# Patient Record
Sex: FEMALE | Race: WHITE | NOT HISPANIC OR LATINO | Employment: FULL TIME | URBAN - METROPOLITAN AREA
[De-identification: names, ages, dates, MRNs, and addresses within clinical notes are randomized per-mention and may not be internally consistent; named-entity substitution may affect disease eponyms.]

---

## 2017-01-09 ENCOUNTER — ALLSCRIPTS OFFICE VISIT (OUTPATIENT)
Dept: OTHER | Facility: OTHER | Age: 27
End: 2017-01-09

## 2017-01-09 ENCOUNTER — GENERIC CONVERSION - ENCOUNTER (OUTPATIENT)
Dept: OTHER | Facility: OTHER | Age: 27
End: 2017-01-09

## 2017-01-10 ENCOUNTER — GENERIC CONVERSION - ENCOUNTER (OUTPATIENT)
Dept: OTHER | Facility: OTHER | Age: 27
End: 2017-01-10

## 2017-01-10 LAB
ABO GROUP BLD: NORMAL
HCG, QUANTITATIVE (HISTORICAL): 2486 MIU/ML
PROGESTERONE LEVEL (HISTORICAL): 21.4 NG/ML
RH BLD: POSITIVE

## 2017-01-11 ENCOUNTER — ALLSCRIPTS OFFICE VISIT (OUTPATIENT)
Dept: OTHER | Facility: OTHER | Age: 27
End: 2017-01-11

## 2017-01-12 LAB — HCG, QUANTITATIVE (HISTORICAL): 4913 MIU/ML

## 2017-01-17 ENCOUNTER — ALLSCRIPTS OFFICE VISIT (OUTPATIENT)
Dept: OTHER | Facility: OTHER | Age: 27
End: 2017-01-17

## 2017-01-17 ENCOUNTER — GENERIC CONVERSION - ENCOUNTER (OUTPATIENT)
Dept: OTHER | Facility: OTHER | Age: 27
End: 2017-01-17

## 2017-01-18 LAB — HCG, QUANTITATIVE (HISTORICAL): NORMAL MIU/ML

## 2017-01-19 ENCOUNTER — GENERIC CONVERSION - ENCOUNTER (OUTPATIENT)
Dept: OTHER | Facility: OTHER | Age: 27
End: 2017-01-19

## 2017-01-19 DIAGNOSIS — O36.80X0 PREGNANCY WITH INCONCLUSIVE FETAL VIABILITY: ICD-10-CM

## 2017-01-24 ENCOUNTER — HOSPITAL ENCOUNTER (OUTPATIENT)
Dept: ULTRASOUND IMAGING | Facility: MEDICAL CENTER | Age: 27
Discharge: HOME | End: 2017-01-24
Payer: COMMERCIAL

## 2017-01-24 DIAGNOSIS — O36.80X0 PREGNANCY WITH INCONCLUSIVE FETAL VIABILITY: ICD-10-CM

## 2017-01-24 PROCEDURE — 76801 OB US < 14 WKS SINGLE FETUS: CPT

## 2017-02-13 ENCOUNTER — GENERIC CONVERSION - ENCOUNTER (OUTPATIENT)
Dept: OTHER | Facility: OTHER | Age: 27
End: 2017-02-13

## 2017-02-16 ENCOUNTER — ALLSCRIPTS OFFICE VISIT (OUTPATIENT)
Dept: OTHER | Facility: OTHER | Age: 27
End: 2017-02-16

## 2017-02-16 ENCOUNTER — GENERIC CONVERSION - ENCOUNTER (OUTPATIENT)
Dept: OTHER | Facility: OTHER | Age: 27
End: 2017-02-16

## 2017-02-17 LAB
ABO GROUP BLD: NORMAL
BACTERIA UR QL AUTO: ABNORMAL
BASOPHILS # BLD AUTO: 0 X10E3/UL (ref 0–0.2)
BASOPHILS # BLD AUTO: 1 %
BILIRUB UR QL STRIP: NEGATIVE
BLD GP AB SCN SERPL QL: NEGATIVE
COLOR UR: YELLOW
COMMENT (HISTORICAL): ABNORMAL
DEPRECATED RDW RBC AUTO: 12.8 % (ref 12.3–15.4)
EOSINOPHIL # BLD AUTO: 0 X10E3/UL (ref 0–0.4)
EOSINOPHIL # BLD AUTO: 1 %
FECAL OCCULT BLOOD DIAGNOSTIC (HISTORICAL): NEGATIVE
GLUCOSE (HISTORICAL): NEGATIVE
HCT VFR BLD AUTO: 40.6 % (ref 34–46.6)
HEPATITIS B SURFACE ANTIGEN (HISTORICAL): NEGATIVE
HGB BLD-MCNC: 13.6 G/DL (ref 11.1–15.9)
IMM.GRANULOCYTES (CD4/8) (HISTORICAL): 0 %
IMM.GRANULOCYTES (CD4/8) (HISTORICAL): 0 X10E3/UL (ref 0–0.1)
KETONES UR STRIP-MCNC: NEGATIVE MG/DL
LEUKOCYTE ESTERASE UR QL STRIP: NEGATIVE
LYMPHOCYTES # BLD AUTO: 1.5 X10E3/UL (ref 0.7–3.1)
LYMPHOCYTES # BLD AUTO: 17 %
MCH RBC QN AUTO: 31.1 PG (ref 26.6–33)
MCHC RBC AUTO-ENTMCNC: 33.5 G/DL (ref 31.5–35.7)
MCV RBC AUTO: 93 FL (ref 79–97)
MICROSCOPIC EXAMINATION (HISTORICAL): ABNORMAL
MICROSCOPIC EXAMINATION (HISTORICAL): ABNORMAL
MONOCYTES # BLD AUTO: 0.7 X10E3/UL (ref 0.1–0.9)
MONOCYTES (HISTORICAL): 8 %
MUCUS THREADS (HISTORICAL): PRESENT
NEUTROPHILS # BLD AUTO: 6.5 X10E3/UL (ref 1.4–7)
NEUTROPHILS # BLD AUTO: 73 %
NITRITE UR QL STRIP: NEGATIVE
NON-SQ EPI CELLS URNS QL MICRO: ABNORMAL /HPF
PH UR STRIP.AUTO: 7 [PH] (ref 5–7.5)
PLATELET # BLD AUTO: 254 X10E3/UL (ref 150–379)
PROT UR STRIP-MCNC: NEGATIVE MG/DL
RBC (HISTORICAL): 4.37 X10E6/UL (ref 3.77–5.28)
RBC (HISTORICAL): ABNORMAL /HPF
RH BLD: POSITIVE
RPR SCREEN (HISTORICAL): NON REACTIVE
RUBELLA, IGG (HISTORICAL): 5.86 INDEX
SP GR UR STRIP.AUTO: 1.02 (ref 1–1.03)
URINALYSIS (UA) (HISTORICAL): ABNORMAL
UROBILINOGEN UR QL STRIP.AUTO: 0.2 EU/DL (ref 0.2–1)
WBC # BLD AUTO: 8.8 X10E3/UL (ref 3.4–10.8)
WBC # BLD AUTO: ABNORMAL /HPF

## 2017-02-21 ENCOUNTER — GENERIC CONVERSION - ENCOUNTER (OUTPATIENT)
Dept: OTHER | Facility: OTHER | Age: 27
End: 2017-02-21

## 2017-02-26 ENCOUNTER — GENERIC CONVERSION - ENCOUNTER (OUTPATIENT)
Dept: OTHER | Facility: OTHER | Age: 27
End: 2017-02-26

## 2017-03-07 ENCOUNTER — ALLSCRIPTS OFFICE VISIT (OUTPATIENT)
Dept: PERINATAL CARE | Facility: CLINIC | Age: 27
End: 2017-03-07
Payer: COMMERCIAL

## 2017-03-07 ENCOUNTER — GENERIC CONVERSION - ENCOUNTER (OUTPATIENT)
Dept: OTHER | Facility: OTHER | Age: 27
End: 2017-03-07

## 2017-03-07 PROCEDURE — 76813 OB US NUCHAL MEAS 1 GEST: CPT | Performed by: OBSTETRICS & GYNECOLOGY

## 2017-03-16 ENCOUNTER — ALLSCRIPTS OFFICE VISIT (OUTPATIENT)
Dept: OTHER | Facility: OTHER | Age: 27
End: 2017-03-16

## 2017-03-17 ENCOUNTER — GENERIC CONVERSION - ENCOUNTER (OUTPATIENT)
Dept: OTHER | Facility: OTHER | Age: 27
End: 2017-03-17

## 2017-03-20 LAB
ADEQUACY: (HISTORICAL): NORMAL
CHLAMYDIA DFA, NAA OR PCR (HISTORICAL): NEGATIVE
CLINICIAN PROVIDIED ICD 9 OR 10 (HISTORICAL): NORMAL
COMMENT (HISTORICAL): NORMAL
DIAGNOSIS (HISTORICAL): NORMAL
GC, DNA PROB (HISTORICAL): NEGATIVE
NOTE: (HISTORICAL): NORMAL
PERFORMED BY (HISTORICAL): NORMAL
REFLEX (HISTORICAL): NORMAL
TEST INFORMATION (HISTORICAL): NORMAL

## 2017-04-11 ENCOUNTER — GENERIC CONVERSION - ENCOUNTER (OUTPATIENT)
Dept: OTHER | Facility: OTHER | Age: 27
End: 2017-04-11

## 2017-04-11 ENCOUNTER — ALLSCRIPTS OFFICE VISIT (OUTPATIENT)
Dept: OTHER | Facility: OTHER | Age: 27
End: 2017-04-11

## 2017-04-12 LAB
BACTERIA UR QL AUTO: ABNORMAL
BILIRUB UR QL STRIP: NEGATIVE
COLOR UR: YELLOW
COMMENT (HISTORICAL): ABNORMAL
CRYSTAL TYPE (HISTORICAL): ABNORMAL
CRYSTALS URNS QL MICRO: PRESENT
FECAL OCCULT BLOOD DIAGNOSTIC (HISTORICAL): NEGATIVE
GLUCOSE (HISTORICAL): NEGATIVE
HIV-1/2 AB-AG (HISTORICAL): NON REACTIVE
KETONES UR STRIP-MCNC: NEGATIVE MG/DL
LEUKOCYTE ESTERASE UR QL STRIP: NEGATIVE
MICROSCOPIC EXAMINATION (HISTORICAL): ABNORMAL
MICROSCOPIC EXAMINATION (HISTORICAL): ABNORMAL
MUCUS THREADS (HISTORICAL): PRESENT
NITRITE UR QL STRIP: NEGATIVE
NON-SQ EPI CELLS URNS QL MICRO: ABNORMAL /HPF
PH UR STRIP.AUTO: 6.5 [PH] (ref 5–7.5)
PROT UR STRIP-MCNC: NEGATIVE MG/DL
RBC (HISTORICAL): ABNORMAL /HPF
SP GR UR STRIP.AUTO: 1.02 (ref 1–1.03)
UROBILINOGEN UR QL STRIP.AUTO: 1 EU/DL (ref 0.2–1)
WBC # BLD AUTO: ABNORMAL /HPF

## 2017-04-13 LAB
CULTURE RESULT (HISTORICAL): NORMAL
MISCELLANEOUS LAB TEST RESULT (HISTORICAL): NO GROWTH

## 2017-04-25 ENCOUNTER — ALLSCRIPTS OFFICE VISIT (OUTPATIENT)
Dept: PERINATAL CARE | Facility: CLINIC | Age: 27
End: 2017-04-25
Payer: COMMERCIAL

## 2017-04-25 ENCOUNTER — GENERIC CONVERSION - ENCOUNTER (OUTPATIENT)
Dept: OTHER | Facility: OTHER | Age: 27
End: 2017-04-25

## 2017-04-25 PROCEDURE — 76805 OB US >/= 14 WKS SNGL FETUS: CPT | Performed by: OBSTETRICS & GYNECOLOGY

## 2017-04-25 PROCEDURE — 76817 TRANSVAGINAL US OBSTETRIC: CPT | Performed by: OBSTETRICS & GYNECOLOGY

## 2017-04-30 ENCOUNTER — GENERIC CONVERSION - ENCOUNTER (OUTPATIENT)
Dept: OTHER | Facility: OTHER | Age: 27
End: 2017-04-30

## 2017-05-11 ENCOUNTER — GENERIC CONVERSION - ENCOUNTER (OUTPATIENT)
Dept: OTHER | Facility: OTHER | Age: 27
End: 2017-05-11

## 2017-06-08 ENCOUNTER — GENERIC CONVERSION - ENCOUNTER (OUTPATIENT)
Dept: OTHER | Facility: OTHER | Age: 27
End: 2017-06-08

## 2017-06-08 ENCOUNTER — ALLSCRIPTS OFFICE VISIT (OUTPATIENT)
Dept: OTHER | Facility: OTHER | Age: 27
End: 2017-06-08

## 2017-06-09 LAB
BASOPHILS # BLD AUTO: 0 %
BASOPHILS # BLD AUTO: 0 X10E3/UL (ref 0–0.2)
DEPRECATED RDW RBC AUTO: 13.1 % (ref 12.3–15.4)
EOSINOPHIL # BLD AUTO: 0.1 X10E3/UL (ref 0–0.4)
EOSINOPHIL # BLD AUTO: 1 %
GESTATIONAL DIABETES SCREEN (HISTORICAL): 117 MG/DL (ref 65–139)
HCT VFR BLD AUTO: 38.8 % (ref 34–46.6)
HGB BLD-MCNC: 12.8 G/DL (ref 11.1–15.9)
IMM.GRANULOCYTES (CD4/8) (HISTORICAL): 0.1 X10E3/UL (ref 0–0.1)
IMM.GRANULOCYTES (CD4/8) (HISTORICAL): 1 %
LYMPHOCYTES # BLD AUTO: 1.5 X10E3/UL (ref 0.7–3.1)
LYMPHOCYTES # BLD AUTO: 13 %
MCH RBC QN AUTO: 32.1 PG (ref 26.6–33)
MCHC RBC AUTO-ENTMCNC: 33 G/DL (ref 31.5–35.7)
MCV RBC AUTO: 97 FL (ref 79–97)
MONOCYTES # BLD AUTO: 0.7 X10E3/UL (ref 0.1–0.9)
MONOCYTES (HISTORICAL): 6 %
NEUTROPHILS # BLD AUTO: 79 %
NEUTROPHILS # BLD AUTO: 9.5 X10E3/UL (ref 1.4–7)
PLATELET # BLD AUTO: 220 X10E3/UL (ref 150–379)
RBC (HISTORICAL): 3.99 X10E6/UL (ref 3.77–5.28)
WBC # BLD AUTO: 11.9 X10E3/UL (ref 3.4–10.8)

## 2017-06-20 ENCOUNTER — GENERIC CONVERSION - ENCOUNTER (OUTPATIENT)
Dept: OTHER | Facility: OTHER | Age: 27
End: 2017-06-20

## 2017-06-20 ENCOUNTER — ALLSCRIPTS OFFICE VISIT (OUTPATIENT)
Dept: PERINATAL CARE | Facility: CLINIC | Age: 27
End: 2017-06-20
Payer: COMMERCIAL

## 2017-06-20 PROCEDURE — 76816 OB US FOLLOW-UP PER FETUS: CPT | Performed by: OBSTETRICS & GYNECOLOGY

## 2017-06-23 ENCOUNTER — GENERIC CONVERSION - ENCOUNTER (OUTPATIENT)
Dept: OTHER | Facility: OTHER | Age: 27
End: 2017-06-23

## 2017-07-06 ENCOUNTER — GENERIC CONVERSION - ENCOUNTER (OUTPATIENT)
Dept: OTHER | Facility: OTHER | Age: 27
End: 2017-07-06

## 2017-07-20 ENCOUNTER — GENERIC CONVERSION - ENCOUNTER (OUTPATIENT)
Dept: OTHER | Facility: OTHER | Age: 27
End: 2017-07-20

## 2017-08-03 ENCOUNTER — GENERIC CONVERSION - ENCOUNTER (OUTPATIENT)
Dept: OTHER | Facility: OTHER | Age: 27
End: 2017-08-03

## 2017-08-07 ENCOUNTER — GENERIC CONVERSION - ENCOUNTER (OUTPATIENT)
Dept: OTHER | Facility: OTHER | Age: 27
End: 2017-08-07

## 2017-08-18 ENCOUNTER — GENERIC CONVERSION - ENCOUNTER (OUTPATIENT)
Dept: OTHER | Facility: OTHER | Age: 27
End: 2017-08-18

## 2017-08-18 ENCOUNTER — ALLSCRIPTS OFFICE VISIT (OUTPATIENT)
Dept: OTHER | Facility: OTHER | Age: 27
End: 2017-08-18

## 2017-08-20 LAB — STREP GRP B, MOLECULAR (HISTORICAL): NEGATIVE

## 2017-08-24 ENCOUNTER — GENERIC CONVERSION - ENCOUNTER (OUTPATIENT)
Dept: OTHER | Facility: OTHER | Age: 27
End: 2017-08-24

## 2017-08-25 LAB — CULTURE GENITAL-BSB ON (HISTORICAL): NORMAL

## 2017-08-31 ENCOUNTER — GENERIC CONVERSION - ENCOUNTER (OUTPATIENT)
Dept: OTHER | Facility: OTHER | Age: 27
End: 2017-08-31

## 2017-09-05 ENCOUNTER — GENERIC CONVERSION - ENCOUNTER (OUTPATIENT)
Dept: OTHER | Facility: OTHER | Age: 27
End: 2017-09-05

## 2017-09-12 ENCOUNTER — GENERIC CONVERSION - ENCOUNTER (OUTPATIENT)
Dept: OTHER | Facility: OTHER | Age: 27
End: 2017-09-12

## 2017-09-13 ENCOUNTER — HOSPITAL ENCOUNTER (INPATIENT)
Facility: HOSPITAL | Age: 27
LOS: 3 days | Discharge: HOME | End: 2017-09-16
Attending: OBSTETRICS & GYNECOLOGY | Admitting: OBSTETRICS & GYNECOLOGY
Payer: COMMERCIAL

## 2017-09-13 ENCOUNTER — ANESTHESIA (OUTPATIENT)
Dept: LABOR AND DELIVERY | Facility: HOSPITAL | Age: 27
End: 2017-09-13
Payer: COMMERCIAL

## 2017-09-13 ENCOUNTER — ANESTHESIA EVENT (OUTPATIENT)
Dept: LABOR AND DELIVERY | Facility: HOSPITAL | Age: 27
End: 2017-09-13
Payer: COMMERCIAL

## 2017-09-13 DIAGNOSIS — Z3A.40 40 WEEKS GESTATION OF PREGNANCY: Primary | ICD-10-CM

## 2017-09-13 PROBLEM — Z98.891 STATUS POST PRIMARY LOW TRANSVERSE CESAREAN SECTION: Status: ACTIVE | Noted: 2017-09-13

## 2017-09-13 LAB
ABO GROUP BLD: NORMAL
ALBUMIN SERPL BCP-MCNC: 2.8 G/DL (ref 3.5–5)
ALP SERPL-CCNC: 190 U/L (ref 46–116)
ALT SERPL W P-5'-P-CCNC: 34 U/L (ref 12–78)
ANION GAP SERPL CALCULATED.3IONS-SCNC: 8 MMOL/L (ref 4–13)
AST SERPL W P-5'-P-CCNC: 21 U/L (ref 5–45)
BASE EXCESS BLDCOA CALC-SCNC: -2 MMOL/L (ref 3–11)
BASE EXCESS BLDCOV CALC-SCNC: -3 MMOL/L (ref 1–9)
BASOPHILS # BLD AUTO: 0.02 THOUSANDS/ΜL (ref 0–0.1)
BASOPHILS NFR BLD AUTO: 0 % (ref 0–1)
BILIRUB SERPL-MCNC: 0.3 MG/DL (ref 0.2–1)
BLD GP AB SCN SERPL QL: NEGATIVE
BUN SERPL-MCNC: 12 MG/DL (ref 5–25)
CALCIUM SERPL-MCNC: 9.2 MG/DL (ref 8.3–10.1)
CHLORIDE SERPL-SCNC: 105 MMOL/L (ref 100–108)
CO2 SERPL-SCNC: 24 MMOL/L (ref 21–32)
CREAT SERPL-MCNC: 0.69 MG/DL (ref 0.6–1.3)
EOSINOPHIL # BLD AUTO: 0.03 THOUSAND/ΜL (ref 0–0.61)
EOSINOPHIL NFR BLD AUTO: 0 % (ref 0–6)
ERYTHROCYTE [DISTWIDTH] IN BLOOD BY AUTOMATED COUNT: 12.7 % (ref 11.6–15.1)
EXTERNAL GROUP B STREP ANTIGEN: NEGATIVE
GFR SERPL CREATININE-BSD FRML MDRD: 118 ML/MIN/1.73SQ M
GLUCOSE SERPL-MCNC: 89 MG/DL (ref 65–140)
HCO3 BLDCOA-SCNC: 27.1 MMOL/L (ref 17.3–27.3)
HCO3 BLDCOV-SCNC: 23.8 MMOL/L (ref 12.2–28.6)
HCT VFR BLD AUTO: 38.2 % (ref 34.8–46.1)
HGB BLD-MCNC: 13.5 G/DL (ref 11.5–15.4)
LYMPHOCYTES # BLD AUTO: 1.48 THOUSANDS/ΜL (ref 0.6–4.47)
LYMPHOCYTES NFR BLD AUTO: 9 % (ref 14–44)
MCH RBC QN AUTO: 33.3 PG (ref 26.8–34.3)
MCHC RBC AUTO-ENTMCNC: 35.3 G/DL (ref 31.4–37.4)
MCV RBC AUTO: 94 FL (ref 82–98)
MONOCYTES # BLD AUTO: 1.55 THOUSAND/ΜL (ref 0.17–1.22)
MONOCYTES NFR BLD AUTO: 9 % (ref 4–12)
NEUTROPHILS # BLD AUTO: 14.3 THOUSANDS/ΜL (ref 1.85–7.62)
NEUTS SEG NFR BLD AUTO: 82 % (ref 43–75)
NRBC BLD AUTO-RTO: 0 /100 WBCS
O2 CT VFR BLDCOA CALC: 3.1 ML/DL
OXYHGB MFR BLDCOA: 12.8 %
OXYHGB MFR BLDCOV: 41 %
PCO2 BLDCOA: 63.4 MM[HG] (ref 30–60)
PCO2 BLDCOV: 48.3 MM HG (ref 27–43)
PH BLDCOA: 7.25 [PH] (ref 7.23–7.43)
PH BLDCOV: 7.31 [PH] (ref 7.19–7.49)
PLATELET # BLD AUTO: 170 THOUSANDS/UL (ref 149–390)
PMV BLD AUTO: 13.7 FL (ref 8.9–12.7)
PO2 BLDCOA: 10.7 MM HG (ref 5–25)
PO2 BLDCOV: 20.2 MM HG (ref 15–45)
POTASSIUM SERPL-SCNC: 3.8 MMOL/L (ref 3.5–5.3)
PROT SERPL-MCNC: 7.2 G/DL (ref 6.4–8.2)
RBC # BLD AUTO: 4.05 MILLION/UL (ref 3.81–5.12)
RH BLD: POSITIVE
SAO2 % BLDCOV: 10.4 ML/DL
SODIUM SERPL-SCNC: 137 MMOL/L (ref 136–145)
SPECIMEN EXPIRATION DATE: NORMAL
WBC # BLD AUTO: 17.38 THOUSAND/UL (ref 4.31–10.16)

## 2017-09-13 PROCEDURE — 80053 COMPREHEN METABOLIC PANEL: CPT | Performed by: OBSTETRICS & GYNECOLOGY

## 2017-09-13 PROCEDURE — 82805 BLOOD GASES W/O2 SATURATION: CPT | Performed by: OBSTETRICS & GYNECOLOGY

## 2017-09-13 PROCEDURE — 86592 SYPHILIS TEST NON-TREP QUAL: CPT | Performed by: OBSTETRICS & GYNECOLOGY

## 2017-09-13 PROCEDURE — 88307 TISSUE EXAM BY PATHOLOGIST: CPT | Performed by: OBSTETRICS & GYNECOLOGY

## 2017-09-13 PROCEDURE — 86850 RBC ANTIBODY SCREEN: CPT | Performed by: OBSTETRICS & GYNECOLOGY

## 2017-09-13 PROCEDURE — 4A1HXCZ MONITORING OF PRODUCTS OF CONCEPTION, CARDIAC RATE, EXTERNAL APPROACH: ICD-10-PCS | Performed by: OBSTETRICS & GYNECOLOGY

## 2017-09-13 PROCEDURE — 86900 BLOOD TYPING SEROLOGIC ABO: CPT | Performed by: OBSTETRICS & GYNECOLOGY

## 2017-09-13 PROCEDURE — 86901 BLOOD TYPING SEROLOGIC RH(D): CPT | Performed by: OBSTETRICS & GYNECOLOGY

## 2017-09-13 PROCEDURE — 85025 COMPLETE CBC W/AUTO DIFF WBC: CPT | Performed by: OBSTETRICS & GYNECOLOGY

## 2017-09-13 RX ORDER — SODIUM CHLORIDE, SODIUM LACTATE, POTASSIUM CHLORIDE, CALCIUM CHLORIDE 600; 310; 30; 20 MG/100ML; MG/100ML; MG/100ML; MG/100ML
125 INJECTION, SOLUTION INTRAVENOUS CONTINUOUS
Status: DISCONTINUED | OUTPATIENT
Start: 2017-09-13 | End: 2017-09-16 | Stop reason: HOSPADM

## 2017-09-13 RX ORDER — OXYTOCIN/RINGER'S LACTATE 30/500 ML
62.5 PLASTIC BAG, INJECTION (ML) INTRAVENOUS ONCE
Status: DISCONTINUED | OUTPATIENT
Start: 2017-09-13 | End: 2017-09-16 | Stop reason: HOSPADM

## 2017-09-13 RX ORDER — DOCUSATE SODIUM 100 MG/1
100 CAPSULE, LIQUID FILLED ORAL 2 TIMES DAILY
Status: DISCONTINUED | OUTPATIENT
Start: 2017-09-13 | End: 2017-09-16 | Stop reason: HOSPADM

## 2017-09-13 RX ORDER — OXYCODONE HYDROCHLORIDE AND ACETAMINOPHEN 5; 325 MG/1; MG/1
2 TABLET ORAL EVERY 4 HOURS PRN
Status: DISCONTINUED | OUTPATIENT
Start: 2017-09-13 | End: 2017-09-16 | Stop reason: HOSPADM

## 2017-09-13 RX ORDER — ACETAMINOPHEN 325 MG/1
650 TABLET ORAL EVERY 6 HOURS PRN
Status: DISCONTINUED | OUTPATIENT
Start: 2017-09-13 | End: 2017-09-16 | Stop reason: HOSPADM

## 2017-09-13 RX ORDER — CALCIUM CARBONATE 200(500)MG
1000 TABLET,CHEWABLE ORAL DAILY PRN
Status: DISCONTINUED | OUTPATIENT
Start: 2017-09-13 | End: 2017-09-16 | Stop reason: HOSPADM

## 2017-09-13 RX ORDER — NALOXONE HYDROCHLORIDE 0.4 MG/ML
0.1 INJECTION, SOLUTION INTRAMUSCULAR; INTRAVENOUS; SUBCUTANEOUS
Status: ACTIVE | OUTPATIENT
Start: 2017-09-13 | End: 2017-09-14

## 2017-09-13 RX ORDER — METOCLOPRAMIDE HYDROCHLORIDE 5 MG/ML
INJECTION INTRAMUSCULAR; INTRAVENOUS AS NEEDED
Status: DISCONTINUED | OUTPATIENT
Start: 2017-09-13 | End: 2017-09-13 | Stop reason: SURG

## 2017-09-13 RX ORDER — KETOROLAC TROMETHAMINE 30 MG/ML
15 INJECTION, SOLUTION INTRAMUSCULAR; INTRAVENOUS EVERY 6 HOURS
Status: COMPLETED | OUTPATIENT
Start: 2017-09-13 | End: 2017-09-14

## 2017-09-13 RX ORDER — IBUPROFEN 600 MG/1
600 TABLET ORAL EVERY 6 HOURS PRN
Status: DISCONTINUED | OUTPATIENT
Start: 2017-09-14 | End: 2017-09-16 | Stop reason: HOSPADM

## 2017-09-13 RX ORDER — MORPHINE SULFATE 1 MG/ML
INJECTION, SOLUTION EPIDURAL; INTRATHECAL; INTRAVENOUS AS NEEDED
Status: DISCONTINUED | OUTPATIENT
Start: 2017-09-13 | End: 2017-09-13 | Stop reason: SURG

## 2017-09-13 RX ORDER — MORPHINE SULFATE 1 MG/ML
INJECTION, SOLUTION EPIDURAL; INTRATHECAL; INTRAVENOUS
Status: DISPENSED
Start: 2017-09-13 | End: 2017-09-14

## 2017-09-13 RX ORDER — ONDANSETRON 2 MG/ML
INJECTION INTRAMUSCULAR; INTRAVENOUS AS NEEDED
Status: DISCONTINUED | OUTPATIENT
Start: 2017-09-13 | End: 2017-09-13 | Stop reason: SURG

## 2017-09-13 RX ORDER — MIDAZOLAM HYDROCHLORIDE 1 MG/ML
INJECTION INTRAMUSCULAR; INTRAVENOUS
Status: DISPENSED
Start: 2017-09-13 | End: 2017-09-14

## 2017-09-13 RX ORDER — OXYCODONE HYDROCHLORIDE AND ACETAMINOPHEN 5; 325 MG/1; MG/1
1 TABLET ORAL EVERY 4 HOURS PRN
Status: DISCONTINUED | OUTPATIENT
Start: 2017-09-14 | End: 2017-09-16 | Stop reason: HOSPADM

## 2017-09-13 RX ORDER — ONDANSETRON 2 MG/ML
4 INJECTION INTRAMUSCULAR; INTRAVENOUS EVERY 6 HOURS PRN
Status: DISCONTINUED | OUTPATIENT
Start: 2017-09-13 | End: 2017-09-16 | Stop reason: HOSPADM

## 2017-09-13 RX ORDER — DIPHENHYDRAMINE HYDROCHLORIDE 50 MG/ML
25 INJECTION INTRAMUSCULAR; INTRAVENOUS EVERY 6 HOURS PRN
Status: DISCONTINUED | OUTPATIENT
Start: 2017-09-13 | End: 2017-09-16 | Stop reason: HOSPADM

## 2017-09-13 RX ORDER — FENTANYL CITRATE 50 UG/ML
INJECTION, SOLUTION INTRAMUSCULAR; INTRAVENOUS
Status: DISPENSED
Start: 2017-09-13 | End: 2017-09-14

## 2017-09-13 RX ORDER — BUPIVACAINE HYDROCHLORIDE 7.5 MG/ML
INJECTION, SOLUTION INTRASPINAL AS NEEDED
Status: DISCONTINUED | OUTPATIENT
Start: 2017-09-13 | End: 2017-09-13 | Stop reason: SURG

## 2017-09-13 RX ORDER — SIMETHICONE 80 MG
80 TABLET,CHEWABLE ORAL 4 TIMES DAILY PRN
Status: DISCONTINUED | OUTPATIENT
Start: 2017-09-13 | End: 2017-09-16 | Stop reason: HOSPADM

## 2017-09-13 RX ORDER — HYDROMORPHONE HCL 110MG/55ML
1 PATIENT CONTROLLED ANALGESIA SYRINGE INTRAVENOUS EVERY 2 HOUR PRN
Status: DISCONTINUED | OUTPATIENT
Start: 2017-09-14 | End: 2017-09-16 | Stop reason: HOSPADM

## 2017-09-13 RX ORDER — ONDANSETRON 2 MG/ML
4 INJECTION INTRAMUSCULAR; INTRAVENOUS EVERY 4 HOURS PRN
Status: ACTIVE | OUTPATIENT
Start: 2017-09-13 | End: 2017-09-14

## 2017-09-13 RX ORDER — ONDANSETRON 2 MG/ML
4 INJECTION INTRAMUSCULAR; INTRAVENOUS EVERY 8 HOURS PRN
Status: DISCONTINUED | OUTPATIENT
Start: 2017-09-13 | End: 2017-09-16 | Stop reason: HOSPADM

## 2017-09-13 RX ORDER — OXYCODONE HYDROCHLORIDE AND ACETAMINOPHEN 5; 325 MG/1; MG/1
2 TABLET ORAL EVERY 4 HOURS PRN
Status: DISCONTINUED | OUTPATIENT
Start: 2017-09-14 | End: 2017-09-16 | Stop reason: HOSPADM

## 2017-09-13 RX ORDER — PROMETHAZINE HYDROCHLORIDE 25 MG/ML
12.5 INJECTION, SOLUTION INTRAMUSCULAR; INTRAVENOUS EVERY 4 HOURS PRN
Status: DISCONTINUED | OUTPATIENT
Start: 2017-09-13 | End: 2017-09-16 | Stop reason: HOSPADM

## 2017-09-13 RX ADMIN — OXYTOCIN 250 MILLI-UNITS/MIN: 10 INJECTION, SOLUTION INTRAMUSCULAR; INTRAVENOUS at 19:51

## 2017-09-13 RX ADMIN — SODIUM CHLORIDE, SODIUM LACTATE, POTASSIUM CHLORIDE, AND CALCIUM CHLORIDE: .6; .31; .03; .02 INJECTION, SOLUTION INTRAVENOUS at 20:01

## 2017-09-13 RX ADMIN — ONDANSETRON HYDROCHLORIDE 8 MG: 2 INJECTION, SOLUTION INTRAVENOUS at 18:48

## 2017-09-13 RX ADMIN — OXYTOCIN 250 MILLI-UNITS/MIN: 10 INJECTION, SOLUTION INTRAMUSCULAR; INTRAVENOUS at 19:19

## 2017-09-13 RX ADMIN — SODIUM CHLORIDE, SODIUM LACTATE, POTASSIUM CHLORIDE, AND CALCIUM CHLORIDE: .6; .31; .03; .02 INJECTION, SOLUTION INTRAVENOUS at 18:37

## 2017-09-13 RX ADMIN — MORPHINE SULFATE 0.1 MG: 1 INJECTION, SOLUTION EPIDURAL; INTRATHECAL; INTRAVENOUS at 19:04

## 2017-09-13 RX ADMIN — KETOROLAC TROMETHAMINE 30 MG: 30 INJECTION, SOLUTION INTRAMUSCULAR at 19:54

## 2017-09-13 RX ADMIN — METOCLOPRAMIDE HYDROCHLORIDE 10 MG: 5 INJECTION INTRAMUSCULAR; INTRAVENOUS at 18:48

## 2017-09-13 RX ADMIN — SODIUM CHLORIDE, SODIUM LACTATE, POTASSIUM CHLORIDE, AND CALCIUM CHLORIDE: .6; .31; .03; .02 INJECTION, SOLUTION INTRAVENOUS at 19:12

## 2017-09-13 RX ADMIN — CEFAZOLIN SODIUM 2000 MG: 2 SOLUTION INTRAVENOUS at 18:48

## 2017-09-13 RX ADMIN — BUPIVACAINE HYDROCHLORIDE IN DEXTROSE 1.6 ML: 7.5 INJECTION, SOLUTION SUBARACHNOID at 19:04

## 2017-09-14 LAB
BASOPHILS # BLD AUTO: 0.03 THOUSANDS/ΜL (ref 0–0.1)
BASOPHILS NFR BLD AUTO: 0 % (ref 0–1)
EOSINOPHIL # BLD AUTO: 0.06 THOUSAND/ΜL (ref 0–0.61)
EOSINOPHIL NFR BLD AUTO: 1 % (ref 0–6)
ERYTHROCYTE [DISTWIDTH] IN BLOOD BY AUTOMATED COUNT: 13 % (ref 11.6–15.1)
HCT VFR BLD AUTO: 29.2 % (ref 34.8–46.1)
HGB BLD-MCNC: 9.9 G/DL (ref 11.5–15.4)
LYMPHOCYTES # BLD AUTO: 1.83 THOUSANDS/ΜL (ref 0.6–4.47)
LYMPHOCYTES NFR BLD AUTO: 14 % (ref 14–44)
MCH RBC QN AUTO: 32.5 PG (ref 26.8–34.3)
MCHC RBC AUTO-ENTMCNC: 33.9 G/DL (ref 31.4–37.4)
MCV RBC AUTO: 96 FL (ref 82–98)
MONOCYTES # BLD AUTO: 0.96 THOUSAND/ΜL (ref 0.17–1.22)
MONOCYTES NFR BLD AUTO: 7 % (ref 4–12)
NEUTROPHILS # BLD AUTO: 10.1 THOUSANDS/ΜL (ref 1.85–7.62)
NEUTS SEG NFR BLD AUTO: 78 % (ref 43–75)
NRBC BLD AUTO-RTO: 0 /100 WBCS
PLATELET # BLD AUTO: 130 THOUSANDS/UL (ref 149–390)
PMV BLD AUTO: 12.9 FL (ref 8.9–12.7)
RBC # BLD AUTO: 3.05 MILLION/UL (ref 3.81–5.12)
RPR SER QL: NORMAL
WBC # BLD AUTO: 12.98 THOUSAND/UL (ref 4.31–10.16)

## 2017-09-14 PROCEDURE — 85025 COMPLETE CBC W/AUTO DIFF WBC: CPT | Performed by: OBSTETRICS & GYNECOLOGY

## 2017-09-14 RX ADMIN — SODIUM CHLORIDE, SODIUM LACTATE, POTASSIUM CHLORIDE, AND CALCIUM CHLORIDE 1000 ML: 600; 310; 30; 20 INJECTION, SOLUTION INTRAVENOUS at 08:07

## 2017-09-14 RX ADMIN — DIPHENHYDRAMINE HYDROCHLORIDE 25 MG: 50 INJECTION, SOLUTION INTRAMUSCULAR; INTRAVENOUS at 00:02

## 2017-09-14 RX ADMIN — KETOROLAC TROMETHAMINE 15 MG: 30 INJECTION, SOLUTION INTRAMUSCULAR at 14:48

## 2017-09-14 RX ADMIN — SODIUM CHLORIDE, SODIUM LACTATE, POTASSIUM CHLORIDE, AND CALCIUM CHLORIDE 125 ML/HR: .6; .31; .03; .02 INJECTION, SOLUTION INTRAVENOUS at 03:20

## 2017-09-14 RX ADMIN — KETOROLAC TROMETHAMINE 15 MG: 30 INJECTION, SOLUTION INTRAMUSCULAR at 02:12

## 2017-09-14 RX ADMIN — DOCUSATE SODIUM 100 MG: 100 CAPSULE, LIQUID FILLED ORAL at 18:37

## 2017-09-14 RX ADMIN — KETOROLAC TROMETHAMINE 15 MG: 30 INJECTION, SOLUTION INTRAMUSCULAR at 08:23

## 2017-09-14 RX ADMIN — IBUPROFEN 600 MG: 600 TABLET, FILM COATED ORAL at 19:11

## 2017-09-14 RX ADMIN — DOCUSATE SODIUM 100 MG: 100 CAPSULE, LIQUID FILLED ORAL at 08:29

## 2017-09-14 RX ADMIN — SODIUM CHLORIDE, SODIUM LACTATE, POTASSIUM CHLORIDE, AND CALCIUM CHLORIDE 125 ML/HR: .6; .31; .03; .02 INJECTION, SOLUTION INTRAVENOUS at 01:11

## 2017-09-15 RX ADMIN — DOCUSATE SODIUM 100 MG: 100 CAPSULE, LIQUID FILLED ORAL at 18:34

## 2017-09-15 RX ADMIN — IBUPROFEN 600 MG: 600 TABLET, FILM COATED ORAL at 17:11

## 2017-09-15 RX ADMIN — IBUPROFEN 600 MG: 600 TABLET, FILM COATED ORAL at 10:59

## 2017-09-15 RX ADMIN — DOCUSATE SODIUM 100 MG: 100 CAPSULE, LIQUID FILLED ORAL at 09:07

## 2017-09-15 RX ADMIN — IBUPROFEN 600 MG: 600 TABLET, FILM COATED ORAL at 04:41

## 2017-09-16 VITALS
DIASTOLIC BLOOD PRESSURE: 96 MMHG | WEIGHT: 198 LBS | HEIGHT: 66 IN | SYSTOLIC BLOOD PRESSURE: 131 MMHG | BODY MASS INDEX: 31.82 KG/M2 | RESPIRATION RATE: 18 BRPM | OXYGEN SATURATION: 100 % | TEMPERATURE: 98.4 F | HEART RATE: 64 BPM

## 2017-09-16 RX ORDER — IBUPROFEN 600 MG/1
600 TABLET ORAL EVERY 6 HOURS PRN
Qty: 30 TABLET | Refills: 0 | Status: SHIPPED | OUTPATIENT
Start: 2017-09-16 | End: 2019-02-25

## 2017-09-16 RX ORDER — OXYCODONE HYDROCHLORIDE AND ACETAMINOPHEN 5; 325 MG/1; MG/1
1 TABLET ORAL EVERY 4 HOURS PRN
Refills: 0
Start: 2017-09-16 | End: 2017-09-16

## 2017-09-16 RX ORDER — OXYCODONE HYDROCHLORIDE AND ACETAMINOPHEN 5; 325 MG/1; MG/1
1-2 TABLET ORAL EVERY 4 HOURS PRN
Qty: 30 TABLET | Refills: 0 | Status: SHIPPED | OUTPATIENT
Start: 2017-09-16 | End: 2017-09-26

## 2017-09-16 RX ADMIN — DOCUSATE SODIUM 100 MG: 100 CAPSULE, LIQUID FILLED ORAL at 08:08

## 2017-09-16 RX ADMIN — IBUPROFEN 600 MG: 600 TABLET, FILM COATED ORAL at 00:26

## 2017-09-16 RX ADMIN — IBUPROFEN 600 MG: 600 TABLET, FILM COATED ORAL at 08:01

## 2017-09-19 ENCOUNTER — GENERIC CONVERSION - ENCOUNTER (OUTPATIENT)
Dept: OTHER | Facility: OTHER | Age: 27
End: 2017-09-19

## 2017-10-24 ENCOUNTER — ALLSCRIPTS OFFICE VISIT (OUTPATIENT)
Dept: OTHER | Facility: OTHER | Age: 27
End: 2017-10-24

## 2017-10-24 ENCOUNTER — GENERIC CONVERSION - ENCOUNTER (OUTPATIENT)
Dept: OTHER | Facility: OTHER | Age: 27
End: 2017-10-24

## 2018-01-09 NOTE — PROGRESS NOTES
Chief Complaint  Pt presents today for a bhcg, prog and t&s  Active Problems    1  Attempting to conceive (V49 89) (Z78 9)    Current Meds   1  Allegra Allergy TABS (Fexofenadine HCl); Therapy: (Recorded:[id number]) to Recorded   2  Prenatal Vitamins TABS; Therapy: (Recorded:[id number]) to Recorded    Allergies    1  Penicillins   2  Sulfa Drugs    Future Appointments    Date/Time Provider Specialty Site   01/17/2017 08:00 AM ARBEN Avila   Obstetrics/Gynecology OB [address] [place] [address]     Signatures   Electronically signed by : ARBEN Tomas ; Saman 10 2017  8:30AM EST

## 2018-01-10 NOTE — PROGRESS NOTES
MAR 7 2017         RE: Kendal Granados                               To: Ob/Gyn Care   [facility] [address]  Chemo's   MR#: [id number]                                  [address]   : AUG Janell [address], [address]: [id number]                             Fax: [phone]   (Exam #: [id number])      The LMP of this 27year old,  G2, P0-0-1-0 patient was DEC 6 2016, giving   her an YULIET of SEP 12 2017 and a current gestational age of 17 weeks 0 days   by dates  A sonographic examination was performed on MAR 7 2017 using real   time equipment  The ultrasound examination was performed using abdominal   technique  The patient has a BMI of 25 0  Her blood pressure today was   139/82  Earliest ultrasound found in her record: 17   7w0d   17  YULIET      Thank you very much for your referral of Kendal Granados to the [facility] in [facility] on 2017 for first trimester ultrasound   evaluation, genetic screening, and MFM assessment  Flynn Valencia is a   68-year-old  2 para 65 white female who is currently at 13-0/7   weeks gestation by an estimated due date of 2017  Her early   prenatal course has been unremarkable  Flynn Valencia has no complaints  She   denies vaginal bleeding  Her past medical history is unremarkable  Her   past surgical history is significant for shoulder surgery for torn labrum  She also has had wisdom teeth removal  She takes no medication with the   exception of a prenatal vitamin on a daily basis  She has allergies to   penicillin and sulfa  She denies tobacco, alcohol, or illicit drug use   during the pregnancy  Her sister has hypertension  There is no other first   degree family member with a diagnosis of hypertension or diabetes  The   family genetic history is negative with respect to genetic abnormalities,   birth defects, or mental retardation        Multiple longitudinal and transverse sections revealed a miranda   intrauterine pregnancy with the fetus in variable presentation  The   placenta is posterior in implantation  Cardiac motion was observed at 162 bpm       INDICATIONS      first trimester genetic screening      Exam Types      sequential screen      RESULTS      Fetus # 1 of 1   Variable presentation   Fetal growth appeared normal      MEASUREMENTS (* Included In Average GA)      CRL              7 2 cm        13 weeks 1 day *   Nuchal Trans    1 80 mm      THE AVERAGE GESTATIONAL AGE is 13 weeks 1 day +/- 7 days  ANATOMY COMMENTS      Anatomic detail is limited at this gestational age  The yolk sac was not   noted  The fetal cranium appeared normal in shape and the nuchal   translucency was normal in size at 1 8 mm  The nasal bone appears to be   present  The intracranial anatomy was unremarkable  Evaluation of the   spine revealed no obvious evidence for a neural tube defect  Anatomy of   the fetal thorax appeared within normal limits  The cardiac rhythm was   regular  Within the abdomen, stomach & bladder were visualized and the   abdominal wall appeared intact  Active movement of the fetal body &   extremities was seen  There is no suspicion of a subchorionic bleed  The   placental cord insertion was normal    There is no suspicion of a uterine   myoma  Free fluid is not seen in the posterior cul-de-sac  ADNEXA      The left ovary was not visualized  The right ovary was not visualized  AMNIOTIC FLUID         Largest Vertical Pocket = 7 1 cm   Amniotic Fluid: Normal      IMPRESSION      Miranda IUP   13 weeks and 1 day by this ultrasound  (YULIET=SEP 11 2017)   Variable presentation   Fetal growth appeared normal   Regular fetal heart rate of 162 bpm   Posterior placenta      GENERAL COMMENT      Today's ultrasound findings and suggested follow-up were discussed in   detail with Elizabeth    The Sequential Screen was discussed in detail, including the sensitivities for detection of Down syndrome, Trisomy 18,   and open neural tube defects  Definitive prenatal diagnosis is possible   only through genetic amniocentesis or CVS  Bautista Upton underwent fingerstick   blood collection for hCG and YOSHI-A to complete the initial component of   the Sequential Screen  Results should be available within one week  Follow-up multiple marker serum screening at 16-18 weeks gestation is   recommended to complete the Sequential Screen  Fetal Level II ultrasound   imaging is scheduled at about 20 weeks gestation  The face to face time, in addition to time spent discussing ultrasound   results, was approximately 10 minutes, greater than 50% of which was spent   during counseling and coordination of care  TANVIR Ruiz S , R ANGEL AC S  ARBEN Celis     Maternal-Fetal Medicine   Electronically signed 03/08/17 05:58

## 2018-01-10 NOTE — PROGRESS NOTES
Chief Complaint  PT presents for a repeat HCG      Active Problems    1  Attempting to conceive (V49 89) (Z78 9)   2  Positive urine pregnancy test (V72 42) (Z32 01)    Current Meds   1  Allegra Allergy TABS; Therapy: (Recorded:52Tvj4167) to Recorded   2  Prenatal Vitamins TABS; Therapy: (Recorded:50Npe2801) to Recorded    Allergies    1  Penicillins   2  Sulfa Drugs    Plan  Encounter to determine fetal viability of pregnancy    · (1) HCG QUANT; Status:Active - Retrospective By Protocol Authorization; Requested  RZJ:[id number]; Future Appointments    Date/Time Provider Specialty Site   01/17/2017 08:00 AM ARBEN Bob  Obstetrics/Gynecology [facility]     Signatures   Electronically signed by :  ARBEN Ruiz ; Jan 11 2017  4:27PM EST                       (Author)

## 2018-01-13 VITALS
WEIGHT: 155 LBS | SYSTOLIC BLOOD PRESSURE: 139 MMHG | HEIGHT: 66 IN | BODY MASS INDEX: 24.91 KG/M2 | DIASTOLIC BLOOD PRESSURE: 82 MMHG

## 2018-01-13 VITALS — DIASTOLIC BLOOD PRESSURE: 90 MMHG | SYSTOLIC BLOOD PRESSURE: 138 MMHG

## 2018-01-13 VITALS
HEIGHT: 66 IN | WEIGHT: 166 LBS | SYSTOLIC BLOOD PRESSURE: 136 MMHG | DIASTOLIC BLOOD PRESSURE: 86 MMHG | BODY MASS INDEX: 26.68 KG/M2

## 2018-01-13 VITALS
SYSTOLIC BLOOD PRESSURE: 138 MMHG | BODY MASS INDEX: 28.93 KG/M2 | HEIGHT: 66 IN | DIASTOLIC BLOOD PRESSURE: 86 MMHG | WEIGHT: 180 LBS

## 2018-01-13 VITALS
BODY MASS INDEX: 31.07 KG/M2 | HEIGHT: 66 IN | DIASTOLIC BLOOD PRESSURE: 70 MMHG | WEIGHT: 193.31 LBS | SYSTOLIC BLOOD PRESSURE: 98 MMHG

## 2018-01-13 NOTE — PROGRESS NOTES
2017         RE: Gretta Goodwin                               To: Ob/Gyn Care   [facility] [address]  Chemo's   MR#: [id number]                                  [address]   : AUG [facility], [address] [place] [address] Street: [id number]                             Fax: [phone]   (Exam #: [id number])      The LMP of this 27year old,  G2, P0-0-1-0 patient was DEC 6 2016, giving   her an YULIET of SEP 12 2017 and a current gestational age of 25 weeks 0 days   by dates  A sonographic examination was performed on 2017 using   real time equipment  The ultrasound examination was performed using   abdominal & vaginal techniques  The patient has a BMI of 26 8  Her blood   pressure today was 106/66  Earliest ultrasound found in her record: 17   7w0d   17  YULIET      [facility] has no complaints today  She reports fetal movements and denies   vaginal bleeding  Her recently completed Sequential Screen revealed a   Down syndrome risk of 1:10,000, Trisomy 18 risk of 1:10,000, and ONTD risk   of 1:6,000  Evaluation of the individual analyte levels reveals no   increased risk for abnormal placentation        Cardiac motion was observed at 150 bpm       INDICATIONS      fetal anatomical survey      Exam Types      LEVEL II   Transvaginal      RESULTS      Fetus # 1 of 1   Breech presentation   Fetal growth appeared normal   Placenta Location = Posterior   No placenta previa   Placenta Grade = I      MEASUREMENTS (* Included In Average GA)      AC              16 1 cm        21 weeks 0 days* (69%)   BPD              4 6 cm        19 weeks 6 days* (48%)   HC              17 7 cm        20 weeks 1 day * (50%)   Femur            3 4 cm        21 weeks 0 days* (61%)      Nuchal Fold      3 8 mm   NBL              6 3 mm      Humerus          3 4 cm        21 weeks 4 days  (84%)      Cerebellum       2 1 cm        20 weeks 5 days   Biorbit          3 2 cm        20 weeks 4 days   CisternaMagna    5 8 mm      HC/AC           1 10   FL/AC           0 21   FL/BPD          0 75   EFW (Ac/Fl/Hc)   384 grams - 0 lbs 14 oz      THE AVERAGE GESTATIONAL AGE is 20 weeks 4 days +/- 10 days  AMNIOTIC FLUID         Largest Vertical Pocket = 5 0 cm   Amniotic Fluid: Normal      CERVICAL EVALUATION      SUPINE      Cervical Length: 4 20 cm      OTHER TEST RESULTS           Funneling?: No             Dynamic Changes?: No        Resp  To TFP?: No                      Debris?: No      ANATOMY      Head                                    Normal   Face/Neck                               Normal   Th  Cav  Normal   Heart                                   See Details   Abd  Cav  Normal   Stomach                                 Normal   Right Kidney                            Normal   Left Kidney                             Normal   Bladder                                 Normal   Abd  Wall                               Normal   Spine                                   Normal   Extrems                                 Normal   Genitalia                               Normal   Placenta                                Normal   Umbl  Cord                              Normal   Uterus                                  Normal   PCI                                     Normal      ANATOMY DETAILS      Visualized Appearing Sonographically Normal:   HEAD: (Calvarium, BPD Level, Cavum, Lateral Ventricles, Choroid Plexus,   Cerebellum, Cisterna Magna);    FACE/NECK: (Neck, Nuchal Fold, Profile,   Orbits, Nose/Lips, Palate, Face);    TH  CAV  : (Lungs, Diaphragm);       HEART: (Four Chamber View, Proximal Left Outflow, Proximal Right Outflow,   3VV, Short [place] of Greater Vessels, Ductal Arch, Aortic Arch,   Interventricular Septum, Interatrial Septum, IVC, SVC, Cardiac Axis,   Cardiac Position);    ABD  CAV : (Liver);    STOMACH, RIGHT KIDNEY, LEFT   KIDNEY, BLADDER, ABD  WALL, SPINE: (Cervical Spine, Thoracic Spine, Lumbar   Spine, Sacrum);    EXTREMS: (Lt Humerus, Rt Humerus, Lt Forearm, Rt   Forearm, Lt Hand, Rt Hand, Lt Femur, Rt Femur, Lt Low Leg, Rt Low Leg, Lt   Foot, Rt Foot);    GENITALIA (Male), PLACENTA, UMBL  CORD, UTERUS, PCI      Not Visualized:   HEART: (3 Vessel Trachea)      ADNEXA      The left ovary appeared normal and measured 2 9 x 1 9 x 1 0 cm with a   volume of 2 9 cc  The right ovary appeared normal and measured 2 4 x 2 1 x   1 8 cm with a volume of 4 7 cc  IMPRESSION      Miranda IUP   20 weeks and 4 days by this ultrasound  (YULIET=SEP 8 2017)   Breech presentation   Fetal growth appeared normal   Regular fetal heart rate of 150 bpm   Posterior placenta   No placenta previa      GENERAL COMMENT      No fetal structural abnormality or ultrasound marker for aneuploidy is   identified on the Level II ultrasound study today  The cardiac 3 vessel   tracheal view is suboptimally imaged secondary to unfavorable fetal   position  Fetal growth and amniotic fluid volume are normal   The   placenta is normal in appearance  A small anterior wall myoma is   identified  The cervix is normal in appearance by transvaginal sonography  The   cervical length is normal   Cervical debris is not present  Cervical   funneling is not present  Neither provocative nor dynamic change is   appreciated  Today's ultrasound findings and suggested follow-up were discussed in   detail with Elizabeth  We discussed that prenatal ultrasound cannot rule   out all congenital abnormalities  Her Sequential Screen results were   discussed in detail  Kathrine Schneider will return to the [address] [place] [address] later in   the second trimester for follow-up [facility] ultrasound evaluation, including   assessment of the cardiac 3 vessel tracheal view which was not imaged well   today        The face to face time, in addition to time spent discussing ultrasound   results, was approximately 10 minutes, greater than 50% of which was spent   during counseling and coordination of care  TANVIR Gomez M D     Maternal-Fetal Medicine   Electronically signed 04/25/17 14:57

## 2018-01-14 VITALS
SYSTOLIC BLOOD PRESSURE: 100 MMHG | DIASTOLIC BLOOD PRESSURE: 60 MMHG | BODY MASS INDEX: 25.25 KG/M2 | WEIGHT: 156.44 LBS

## 2018-01-14 NOTE — MISCELLANEOUS
Message  Message Free Text Note Form: Pt pages to reports that when she went to the bathroom to urinate she noted a dime size brown texture in the bottom of the toilet  SHe reports that when she wiped there was no bleeding or brown noted on the toilet paper  She also reports no brown or blood in her underwear  She reports +FM  Denies vb, lof, ctx  Pt advised this may have been in the bathroom before or may have been a small amount of BM  Pt advised to call she develops cramping bleeding or recurrence of symptoms        Signatures   Electronically signed by : ARBEN Damico ; Apr 30 2017 12:14PM EST                       (Author)

## 2018-01-16 NOTE — RESULT NOTES
Verified Results  (1) HCG QUANT [id number] 12:00AM LOVEThESIGN     Test Name Result Flag Reference   hCG,Beta Subunit,Qnt,Serum 40355 mIU/mL     Female (Non-pregnant)    0 -     5                                             (Postmenopausal)  0 -     8                                      Female (Pregnant)                                      Weeks of Gestation                                              3                6 -    71                                              4               10 -   750                                              5              918 - 5116                                              6              [address]                                              0            [address] [facility]                                              2            [phone]                                             27            [facility]                                             [id number] -  - [phone]  Specimen was diluted in  order to obtain results  Results were repeated    Roche ECLIA methodology                       (1) HCG QUANT [id number] 12:00AM LOVEThESIGN     Test Name Result Flag Reference   hCG,Beta Subunit,Qnt,Serum 4913 mIU/mL     Female (Non-pregnant)    0 -     5                                             (Postmenopausal)  0 -     8                                      Female (Pregnant)                                      Weeks of Gestation                                              3                6 -    70 4               10 - 387                                              5              725 - 3238                                              6              [address] [facility] methodology                         Plan  Encounter to determine fetal viability of pregnancy    · US OB < 14 WEEKS SINGLE OR FIRST DESTINATION LEVEL 2; Status:Hold For -  Scheduling; Requested RRY:[id number];

## 2018-01-17 NOTE — RESULT NOTES
Verified Results  [facility]) Prenatal Profile I [id number] 09:31AM Demetris Mahoney     Test Name Result Flag Reference   HBsAg Screen Negative  Negative   Rubella Antibodies, IgG 5 86 index  Immune >0 99   Non-immune       <0 90                                                 Equivocal  0 90 - 0 99                                                 Immune           >0 99   ABO Grouping B     Rh Factor Positive     Please note: Prior records for this patient's ABO / Rh type are not  available for additional verification  Antibody Screen Negative  Negative   RPR Non Reactive  Non Reactive   WBC 8 8 x10E3/uL  3 4-10 8   RBC 4 37 x10E6/uL  3 77-5 28   Hemoglobin 13 6 g/dL  11 1-15 9   Hematocrit 40 6 %  34 0-46  6   MCV 93 fL  79-97   MCH 31 1 pg  26 6-33 0   MCHC 33 5 g/dL  31 5-35 7   RDW 12 8 %  12 3-15 4   Platelets 552 W94Z5/EZ  150-379   Neutrophils 73 %     Lymphs 17 %     Monocytes 8 %     Eos 1 %     Basos 1 %     Neutrophils (Absolute) 6 5 x10E3/uL  1 4-7 0   Lymphs (Absolute) 1 5 x10E3/uL  0 7-3 1   Monocytes(Absolute) 0 7 x10E3/uL  0 1-0 9   Eos (Absolute) 0 0 x10E3/uL  0 0-0 4   Baso (Absolute) 0 0 x10E3/uL  0 0-0 2   Immature Granulocytes 0 %     Immature Grans (Abs) 0 0 x10E3/uL  0 0-0 1     (LC) UA/M w/rflx Culture, Routine [id number] 09:31AM Demetris Mahoney     Test Name Result Flag Reference   Specific Gravity 1 017  1 005-1 030   pH 7 0  5 0-7 5   Urine-Color Yellow  Yellow   Appearance Cloudy A Clear   WBC Esterase Negative  Negative   Protein Negative  Negative/Trace   Glucose Negative  Negative   Ketones Negative  Negative   Occult Blood Negative  Negative   Bilirubin Negative  Negative   Urobilinogen,Semi-Qn 0 2 EU/dL  0 2-1 0   Nitrite, Urine Negative  Negative   Microscopic Examination Comment     Microscopic follows if indicated  Microscopic Examination See below:     Urinalysis Reflex Comment     This specimen will not reflex to a Urine Culture     WBC 0-5 /hpf  0 -  5   RBC 0-2 /hpf  0 -  2 Epithelial Cells (non renal) 0-10 /hpf  0 - 10   Mucus Threads Present  Not Estab     Bacteria Few  None seen/Few

## 2018-01-17 NOTE — PROGRESS NOTES
2017         RE: Jay Mo                               To: Ob/Gyn Care   [facility]   MR#: [id number]                                  [address]   : AUG [address] ,[address], [address] [place] [address] Street: [id number]                             Fax: [phone]   (Exam #: [id number])      The LMP of this 27year old,  G2, P0-0-1-0 patient was DEC 6 2016, giving   her an YULIET of SEP 12 2017 and a current gestational age of 35 weeks 0 days   by dates  A sonographic examination was performed on 2017 using   real time equipment  The ultrasound examination was performed using   abdominal technique  The patient has a BMI of 29 0  Her blood pressure   today was 138/86  Earliest ultrasound found in her record: 17   7w0d   17  YULIET      Alyx Argueta has no complaints today  She reports regular fetal movement and   denies problems related to vaginal bleeding,  labor, or   hypertension  Screening for gestational diabetes on  revealed a   normal one-hour post Glucola value of 117 mg/dL  Cardiac motion was observed at 157 bpm       INDICATIONS      missed anatomy follow up      Exam Types      Level I      RESULTS      Fetus # 1 of 1   Vertex presentation   Fetal growth appeared normal   Placenta Location = Posterior   Placenta Grade = I      MEASUREMENTS (* Included In Average GA)      AC              25 6 cm        29 weeks 6 days* (77%)   BPD              7 5 cm        30 weeks 1 day * (82%)   HC              27 0 cm        29 weeks 1 day * (59%)   Femur            5 8 cm        30 weeks 1 day * (78%)      Cerebellum       3 4 cm        30 weeks 0 days      HC/AC           1 06   FL/AC           0 23   FL/BPD          0 77   EFW (Ac/Fl/Hc)  1468 grams - 3 lbs 4 oz                 (76%)      THE AVERAGE GESTATIONAL AGE is 29 weeks 6 days +/- 18 days        AMNIOTIC FLUID      Q1: 3 4      Q2: 2 4      Q3: 3 4 Q4: 4 4   DORI Total = 13 6 cm      ANATOMY DETAILS      Visualized Appearing Sonographically Normal:   HEAD: (Calvarium, BPD Level, Cavum, Lateral Ventricles, Cerebellum);      FACE/NECK: (Profile, Nose/Lips);    TH  CAV : (Diaphragm); HEART: (Four   Chamber View, Proximal Left Outflow, Proximal Right Outflow, 3VV, 3 Vessel   Trachea, Cardiac Axis, Cardiac Position);    STOMACH, RIGHT KIDNEY, LEFT   KIDNEY, BLADDER, PLACENTA      ANATOMY COMMENTS      The prior fetal anatomic survey was limited with respect to evaluation of   the cardiac 3 vessel tracheal view  This anatomic view was seen today as   sonographically normal within the inherent limitations of fetal ultrasound  IMPRESSION      Miranda IUP   29 weeks and 6 days by this ultrasound  (YULIET=AUG 30 2017)   Vertex presentation   Fetal growth appeared normal   Regular fetal heart rate of 157 bpm   Posterior placenta      GENERAL COMMENT      No fetal structural abnormality is identified on the Level I survey today  Fetal interval growth and amniotic fluid volume are normal       Today's ultrasound findings and suggested follow-up were discussed in   detail with Elizabeth  Her gestational diabetes screening results with   discussed  Daily third trimester fetal kick counting was discussed at the   visit today  No further appointment has been scheduled in the [facility] for Xi Miguel at this time  Follow-up M ultrasound evaluation is   recommended as clinically indicated  The face to face time, in addition to time spent discussing ultrasound   results, was approximately 10 minutes, greater than 50% of which was spent   during counseling and coordination of care  TANVIR Wolf M D     Maternal-Fetal Medicine   Electronically signed 06/20/17 13:36

## 2018-01-17 NOTE — RESULT NOTES
Verified Results  (1) HCG QUANT [id number] 12:00AM Sarah Bethea     Test Name Result Flag Reference   hCG,Beta Subunit,Qnt,Serum 2486 mIU/mL     Female (Non-pregnant)    0 -     5                                             (Postmenopausal)  0 -     8                                      Female (Pregnant)                                      Weeks of Gestation                                              3                6 -    71                                              4               10 -   551                                              4              319 -  8206                                              4              100 - [address] [facility] [id number]                                             62            [facility] -[id number]                                             14            [facility]                                             14            [id number] - [id number]                                             80            [id number] - [address] [facility] methodology     (1) PROGESTERONE [id number] 12:00AM Sarah Bethea     Test Name Result Flag Reference   Progesterone 41 1 ng/mL     Follicular phase       0 1 -   0 9                                      Luteal phase           1 8 -  23 9                                      Ovulation phase        0 1 -  12 0                                      Pregnant                                         First trimester    11 0 -  44 3                                         Second trimester   25 4 -  83 3                                         Third trimester 58 7 - 214 0                                      Postmenopausal         0 0 -   0 1     (1) ABO/RH(D) [id number] 12:00AM Sarah Bethea     Test Name Result Flag Reference   ABO Grouping B     Rh Factor Positive     Please note: Prior records for this patient's ABO / Rh type are not  available for additional verification

## 2018-01-22 VITALS
WEIGHT: 197.56 LBS | BODY MASS INDEX: 31.89 KG/M2 | SYSTOLIC BLOOD PRESSURE: 118 MMHG | DIASTOLIC BLOOD PRESSURE: 72 MMHG

## 2018-01-22 VITALS — WEIGHT: 190 LBS | DIASTOLIC BLOOD PRESSURE: 70 MMHG | SYSTOLIC BLOOD PRESSURE: 124 MMHG | BODY MASS INDEX: 30.67 KG/M2

## 2018-01-22 VITALS — DIASTOLIC BLOOD PRESSURE: 84 MMHG | WEIGHT: 195 LBS | BODY MASS INDEX: 31.47 KG/M2 | SYSTOLIC BLOOD PRESSURE: 132 MMHG

## 2018-01-22 VITALS — SYSTOLIC BLOOD PRESSURE: 122 MMHG | WEIGHT: 186 LBS | DIASTOLIC BLOOD PRESSURE: 76 MMHG | BODY MASS INDEX: 30.02 KG/M2

## 2018-01-22 VITALS
SYSTOLIC BLOOD PRESSURE: 100 MMHG | WEIGHT: 190.25 LBS | BODY MASS INDEX: 30.71 KG/M2 | DIASTOLIC BLOOD PRESSURE: 70 MMHG

## 2018-01-22 VITALS
HEIGHT: 66 IN | BODY MASS INDEX: 24.01 KG/M2 | SYSTOLIC BLOOD PRESSURE: 142 MMHG | WEIGHT: 149.38 LBS | DIASTOLIC BLOOD PRESSURE: 88 MMHG

## 2018-01-22 VITALS
DIASTOLIC BLOOD PRESSURE: 78 MMHG | WEIGHT: 181.13 LBS | BODY MASS INDEX: 29.23 KG/M2 | SYSTOLIC BLOOD PRESSURE: 110 MMHG

## 2018-01-22 VITALS
DIASTOLIC BLOOD PRESSURE: 80 MMHG | HEIGHT: 66 IN | BODY MASS INDEX: 31.87 KG/M2 | SYSTOLIC BLOOD PRESSURE: 118 MMHG | WEIGHT: 198.31 LBS

## 2018-01-22 VITALS
SYSTOLIC BLOOD PRESSURE: 102 MMHG | DIASTOLIC BLOOD PRESSURE: 60 MMHG | BODY MASS INDEX: 28.32 KG/M2 | WEIGHT: 175.44 LBS

## 2018-01-22 VITALS
HEIGHT: 66 IN | SYSTOLIC BLOOD PRESSURE: 102 MMHG | BODY MASS INDEX: 29.66 KG/M2 | WEIGHT: 184.56 LBS | DIASTOLIC BLOOD PRESSURE: 70 MMHG

## 2018-01-22 VITALS — WEIGHT: 200 LBS | BODY MASS INDEX: 32.28 KG/M2 | SYSTOLIC BLOOD PRESSURE: 110 MMHG | DIASTOLIC BLOOD PRESSURE: 72 MMHG

## 2018-01-22 VITALS — DIASTOLIC BLOOD PRESSURE: 80 MMHG | BODY MASS INDEX: 25.66 KG/M2 | SYSTOLIC BLOOD PRESSURE: 110 MMHG | WEIGHT: 159 LBS

## 2018-01-22 VITALS — BODY MASS INDEX: 28.25 KG/M2 | SYSTOLIC BLOOD PRESSURE: 120 MMHG | DIASTOLIC BLOOD PRESSURE: 78 MMHG | WEIGHT: 175 LBS

## 2018-01-22 VITALS — DIASTOLIC BLOOD PRESSURE: 82 MMHG | WEIGHT: 170 LBS | SYSTOLIC BLOOD PRESSURE: 118 MMHG | BODY MASS INDEX: 27.44 KG/M2

## 2018-03-07 NOTE — PROGRESS NOTES
Education  [employer] Education 1st Trimester:   First Trimester Education provided:   benefits of breastfeeding, importance of exclusive breastfeeding, early initiation of breastfeeding and exclusive breastfeeding for the first 6 months   The patient is undecided on breastfeeding  Prenatal education provided by: [employer]   Electronically signed by :  MARGE Almonte; Mar 16 2017  4:47PM EST                       (Author)

## 2018-05-17 ENCOUNTER — TELEPHONE (OUTPATIENT)
Dept: OBGYN CLINIC | Facility: MEDICAL CENTER | Age: 28
End: 2018-05-17

## 2018-05-17 NOTE — TELEPHONE ENCOUNTER
Patient called in stating that she delivered her baby via emergency  on 17  She started her cycles in November and the within the past 3 months they have been starting and stopping and starting again, all within a 2 to 5 day time frame  She knows that irregularity isn't uncommon but wanted to see if this was normal or if there is something that she can do  She is not breastfeeding  Please advise

## 2018-05-21 DIAGNOSIS — N92.6 IRREGULAR BLEEDING: Primary | ICD-10-CM

## 2018-05-21 NOTE — TELEPHONE ENCOUNTER
Pt is aware  Orders placed  Will call to schedule u/s and go for bw  Will await our phone call with results

## 2018-05-24 ENCOUNTER — HOSPITAL ENCOUNTER (OUTPATIENT)
Dept: ULTRASOUND IMAGING | Facility: MEDICAL CENTER | Age: 28
Discharge: HOME | End: 2018-05-24
Payer: COMMERCIAL

## 2018-05-24 ENCOUNTER — APPOINTMENT (OUTPATIENT)
Dept: LAB | Facility: MEDICAL CENTER | Age: 28
End: 2018-05-24
Payer: COMMERCIAL

## 2018-05-24 DIAGNOSIS — N92.6 IRREGULAR BLEEDING: ICD-10-CM

## 2018-05-24 LAB
BASOPHILS # BLD AUTO: 0.03 THOUSANDS/ΜL (ref 0–0.1)
BASOPHILS NFR BLD AUTO: 0 % (ref 0–1)
EOSINOPHIL # BLD AUTO: 0.07 THOUSAND/ΜL (ref 0–0.61)
EOSINOPHIL NFR BLD AUTO: 1 % (ref 0–6)
ERYTHROCYTE [DISTWIDTH] IN BLOOD BY AUTOMATED COUNT: 12.9 % (ref 11.6–15.1)
HCT VFR BLD AUTO: 42.3 % (ref 34.8–46.1)
HGB BLD-MCNC: 14 G/DL (ref 11.5–15.4)
LYMPHOCYTES # BLD AUTO: 2.02 THOUSANDS/ΜL (ref 0.6–4.47)
LYMPHOCYTES NFR BLD AUTO: 29 % (ref 14–44)
MCH RBC QN AUTO: 30.8 PG (ref 26.8–34.3)
MCHC RBC AUTO-ENTMCNC: 33.1 G/DL (ref 31.4–37.4)
MCV RBC AUTO: 93 FL (ref 82–98)
MONOCYTES # BLD AUTO: 0.64 THOUSAND/ΜL (ref 0.17–1.22)
MONOCYTES NFR BLD AUTO: 9 % (ref 4–12)
NEUTROPHILS # BLD AUTO: 4.11 THOUSANDS/ΜL (ref 1.85–7.62)
NEUTS SEG NFR BLD AUTO: 60 % (ref 43–75)
NRBC BLD AUTO-RTO: 0 /100 WBCS
PLATELET # BLD AUTO: 295 THOUSANDS/UL (ref 149–390)
PMV BLD AUTO: 12.2 FL (ref 8.9–12.7)
RBC # BLD AUTO: 4.55 MILLION/UL (ref 3.81–5.12)
TSH SERPL DL<=0.05 MIU/L-ACNC: 0.73 UIU/ML (ref 0.36–3.74)
WBC # BLD AUTO: 6.88 THOUSAND/UL (ref 4.31–10.16)

## 2018-05-24 PROCEDURE — 36415 COLL VENOUS BLD VENIPUNCTURE: CPT

## 2018-05-24 PROCEDURE — 85025 COMPLETE CBC W/AUTO DIFF WBC: CPT

## 2018-05-24 PROCEDURE — 76856 US EXAM PELVIC COMPLETE: CPT

## 2018-05-24 PROCEDURE — 76830 TRANSVAGINAL US NON-OB: CPT

## 2018-05-24 PROCEDURE — 84443 ASSAY THYROID STIM HORMONE: CPT

## 2018-05-30 NOTE — PROGRESS NOTES
Ultrasound normal please advise  Labs normal  We can start OCPs if she would like to regulate her cycle

## 2018-11-01 ENCOUNTER — ANNUAL EXAM (OUTPATIENT)
Dept: OBGYN CLINIC | Facility: MEDICAL CENTER | Age: 28
End: 2018-11-01
Payer: COMMERCIAL

## 2018-11-01 VITALS — SYSTOLIC BLOOD PRESSURE: 110 MMHG | WEIGHT: 144 LBS | BODY MASS INDEX: 23.24 KG/M2 | DIASTOLIC BLOOD PRESSURE: 70 MMHG

## 2018-11-01 DIAGNOSIS — Z01.419 ENCOUNTER FOR WELL WOMAN EXAM WITH ROUTINE GYNECOLOGICAL EXAM: Primary | ICD-10-CM

## 2018-11-01 PROCEDURE — S0612 ANNUAL GYNECOLOGICAL EXAMINA: HCPCS | Performed by: OBSTETRICS & GYNECOLOGY

## 2018-11-01 RX ORDER — FEXOFENADINE HCL 180 MG/1
180 TABLET ORAL
COMMUNITY
Start: 2014-11-03 | End: 2019-02-25

## 2018-11-01 NOTE — PATIENT INSTRUCTIONS
Thank you for trusting Yadi Nagel with your care  Let us know how we did! You may be receiving a survey about your experience with [facility]  Please take a moment to complete the survey  Thank you!

## 2018-11-01 NOTE — PROGRESS NOTES
ASSESSMENT & PLAN: Keyana Epps is a 25 y o  [id number] with normal gynecologic exam     1   Routine well woman exam done today  2  Pap and HPV:  The patient's last pap was   It was normal     Pap and cotesting was not done today  Current ASCCP Guidelines reviewed  Due   3  The following were reviewed in today's visit: breast self exam   4  BTB one week prior to cycle, declines OCPs    CC:  Annual Gynecologic Examination    HPI: Keyana Epps is a 25 y o  Shahla Velazquez who presents for annual gynecologic examination  She has the following concerns:  Complains of 1 day of bleeding approximately 1 week prior to cycle  Health Maintenance:    She wears her seatbelt routinely  She does perform regular monthly self breast exams  She feels safe at home  Past Medical History:   Diagnosis Date    Abnormal Pap smear of cervix     ASCUS    Anemia     age 13 took iron supplements x6 months    Migraine     age 13     Spontaneous      Last assessed: 16       Past Surgical History:   Procedure Laterality Date    MOUTH SURGERY      age 11    [place]  DELIVERY ONLY N/A 2017    Procedure:  SECTION (); Surgeon: Mayi Barnard MD;  Location: [facility];  Service: Obstetrics    SHOULDER SURGERY      age 23    WISDOM TOOTH EXTRACTION         Past OB/Gyn History:  OB History      Para Term  AB Living    2 1 1 0 1 1    SAB TAB Ectopic Multiple Live Births    1 0 0 0 1        Pt has menstrual issues  See above   History of sexually transmitted infection: No   History of abnormal pap smears: No      Patient is currently sexually active  heterosexual   The current method of family planning is none      Family History   Problem Relation Age of Onset    Cancer Mother         tongue    Hypertension Mother         essential    Thyroid disease Mother    Jullie Liming Hypothyroidism Father     Hypertension Father     Thyroid disease Father     Hypertension Sister     Migraines Sister         headaches    Cancer Maternal Grandmother     Hypertension Maternal Grandmother         essential    Breast cancer Maternal Grandmother     Thyroid disease Maternal Grandmother     Cancer Maternal Grandfather     Diabetes Maternal Grandfather     Hypertension Maternal Grandfather         essential    Lung cancer Maternal Grandfather     Cancer Paternal Grandmother     Uterine cancer Paternal Grandmother        Social History:  Social History     Social History    Marital status: /Civil Union     Spouse name: N/A    Number of children: N/A    Years of education: N/A     Occupational History    Not on file  Social History Main Topics    Smoking status: Never Smoker    Smokeless tobacco: Never Used    Alcohol use No    Drug use: No    Sexual activity: Yes     Partners: Male     Birth control/ protection: None     Other Topics Concern    Not on file     Social History Narrative    Attempting to conceive             Allergies   Allergen Reactions    Penicillins Rash    Sulfa Antibiotics Rash         Current Outpatient Prescriptions:     fexofenadine (ALLEGRA) 180 MG tablet, Take 180 mg by mouth, Disp: , Rfl:     ibuprofen (MOTRIN) 600 mg tablet, Take 1 tablet by mouth every 6 (six) hours as needed for mild pain (Patient not taking: Reported on 11/1/2018 ), Disp: 30 tablet, Rfl: 0    Prenatal Vit-Fe Fumarate-FA (PRENATAL 1+1 PO), Take 1 tablet by mouth daily, Disp: , Rfl:       Review of Systems  Constitutional :no fever, feels well, no tiredness, no recent weight gain or loss  ENT: no ear ache, no loss of hearing, no nosebleeds or nasal discharge, no sore throat or hoarseness  Cardiovascular: no complaints of slow or fast heart beat, no chest pain, no palpitations, no leg claudication or lower extremity edema    Respiratory: no complaints of shortness of shortness of breath, no FISHER  Breasts:no complaints of breast pain, breast lump, or nipple discharge  Gastrointestinal: no complaints of abdominal pain, constipation, nausea, vomiting, or diarrhea or bloody stools  Genitourinary : no complaints of dysuria, incontinence, pelvic pain, no dysmenorrhea, vaginal discharge or abnormal vaginal bleeding and as noted in HPI  Musculoskeletal: no complaints of arthralgia, no myalgia, no joint swelling or stiffness, no limb pain or swelling  Integumentary: no complaints of skin rash or lesion, itching or dry skin  Neurological: no complaints of headache, no confusion, no numbness or tingling, no dizziness or fainting    Objective      /70   Wt 65 3 kg (144 lb)   LMP 10/15/2018   BMI 23 24 kg/m²   General:   appears stated age, cooperative, alert normal mood and affect   Neck: normal, supple,trachea midline, no masses   Heart: regular rate and rhythm, S1, S2 normal, no murmur, click, rub or gallop   Lungs: clear to auscultation bilaterally   Breasts: normal appearance, no masses or tenderness, Inspection negative, No nipple retraction or dimpling, No nipple discharge or bleeding, No axillary or supraclavicular adenopathy, Normal to palpation without dominant masses   Abdomen: soft, non-tender, without masses or organomegaly   Vulva: normal female genitalia, Bartholin's, Urethra, [place] normal, no lesions, normal female hair distribution   Vagina: normal vagina, no discharge, exudate, lesion, or erythema   Urethra: normal   Cervix: Normal, no discharge  Uterus: normal size, contour, position, consistency, mobility, non-tender   Adnexa: normal adnexa and no mass, fullness, tenderness   Lymphatic palpation of lymph nodes in neck, axilla, groin and/or other locations: no lymphadenopathy or masses noted   Skin normal skin turgor and no rashes     Psychiatric orientation to person, place, and time: normal  mood and affect: normal

## 2019-02-25 ENCOUNTER — OFFICE VISIT (OUTPATIENT)
Dept: RHEUMATOLOGY | Facility: CLINIC | Age: 29
End: 2019-02-25
Payer: COMMERCIAL

## 2019-02-25 VITALS
WEIGHT: 144 LBS | SYSTOLIC BLOOD PRESSURE: 120 MMHG | DIASTOLIC BLOOD PRESSURE: 74 MMHG | BODY MASS INDEX: 22.6 KG/M2 | HEART RATE: 64 BPM | HEIGHT: 67 IN

## 2019-02-25 DIAGNOSIS — R79.89 LOW TSH LEVEL: ICD-10-CM

## 2019-02-25 DIAGNOSIS — R76.0 ANTIPHOSPHOLIPID ANTIBODY POSITIVE: ICD-10-CM

## 2019-02-25 DIAGNOSIS — R76.8 ANA POSITIVE: ICD-10-CM

## 2019-02-25 DIAGNOSIS — L30.8 INTERFACE DERMATITIS: Primary | ICD-10-CM

## 2019-02-25 PROCEDURE — 99204 OFFICE O/P NEW MOD 45 MIN: CPT | Performed by: INTERNAL MEDICINE

## 2019-02-25 NOTE — PROGRESS NOTES
Assessment and Plan:   Patient is a 76-year-old female with no significant medical issues who presents for rheumatology evaluation regarding nonspecific interface changes on skin biopsy in the setting of recurrent rash on the extensor surfaces of the elbows and knees along with her toes, and lab findings of low positive HERNÁN, cardiolipin, beta 2 glycoprotein, and low TSH  Discussion with her today reveals that her main issue is the rash and she essentially has no other significant complaints today  She did not have any concerning symptoms of an underlying systemic condition at this time such as lupus, and blood testing was negative for more lupus specific antibodies which is reassuring  The biopsy report indicates the interface changes which can be seen with dermatomyositis and lupus, but she has no clinical features of either these conditions at this time  She has completely normal muscle strength on exam and has no complaints of muscle weakness, and also her muscle enzymes were normal   In addition the biopsy report mentions the possibility of dermatitis herpetiformis, which most commonly is seen with celiac disease along with autoimmune thyroid disease  Notably she did have a low TSH level and so this needs to be rechecked with a free T4  We will do additional workup to look for positive serologies for an underlying systemic autoimmune disease but she currently lacks features of a systemic condition  Interestingly when she cut out the gluten the rash initially resolved and then recurred when she reintroduced gluten, but then she reports it did not improve when she cut out gluten the 2nd time  The timeline of these dietary changes is unclear and ultimately if she does not test positive for celiac in the blood, she possibly may need a colonoscopy for biopsy of the small bowel which is the more definitive way to assess for celiac disease    The anti phospholipid antibodies are of unclear significance and can transiently become positive during times of infection amongst other things  She also currently does not meet [place] criteria for APS as she has not had a qualifying clinical event  These need to be repeated in 12 weeks so we will hold off for now but will plan to repeat them 12 weeks from the original lab work  In the meantime she will get the below listed lab work to assess for underlying systemic conditions and we will follow up in 6 weeks  She will let me know in the meantime if the rash recurs and if it does recur, I suggest repeat biopsy with Dermatology with direct immunofluorescence on the histology, which would be a more definitive way to diagnose dermatitis herpetiformis  Plan:  Diagnoses and all orders for this visit:    Interface dermatitis  -     Urinalysis with microscopic  -     Protein / creatinine ratio, urine  -     C3 complement  -     C4 complement  -     C-reactive protein  -     Sedimentation rate, automated  -     RF Screen w/ Reflex to Titer; Future  -     Cyclic citrul peptide antibody, IgG  -     HLA-B27 antigen  -     Protein electrophoresis, serum; Future  -     TSH, 3rd generation with Free T4 reflex  -     Chronic Hepatitis Panel  -     ANCA Screen With MPO and PR3 With Reflex To ANCA Titer; Future  -     Celiac Disease Antibody Profile; Future  -     MISCELLANEOUS LAB TEST; Future  -     Lyme Antibody Profile with reflex to WB; Future  -     IgG, IgA, IgM; Future  -     Angiotensin converting enzyme; Future    HERNÁN positive  -     Urinalysis with microscopic  -     Protein / creatinine ratio, urine  -     C3 complement  -     C4 complement  -     C-reactive protein  -     Sedimentation rate, automated  -     RF Screen w/ Reflex to Titer; Future  -     Cyclic citrul peptide antibody, IgG  -     HLA-B27 antigen  -     Protein electrophoresis, serum;  Future  -     TSH, 3rd generation with Free T4 reflex  -     Chronic Hepatitis Panel  -     ANCA Screen With MPO and PR3 With Reflex To ANCA Titer; Future  -     Celiac Disease Antibody Profile; Future  -     MISCELLANEOUS LAB TEST; Future  -     Lyme Antibody Profile with reflex to WB; Future  -     IgG, IgA, IgM; Future  -     Angiotensin converting enzyme; Future    Antiphospholipid antibody positive  -     Urinalysis with microscopic  -     Protein / creatinine ratio, urine  -     C3 complement  -     C4 complement  -     C-reactive protein  -     Sedimentation rate, automated  -     RF Screen w/ Reflex to Titer; Future  -     Cyclic citrul peptide antibody, IgG  -     HLA-B27 antigen  -     Protein electrophoresis, serum; Future  -     TSH, 3rd generation with Free T4 reflex  -     Chronic Hepatitis Panel  -     ANCA Screen With MPO and PR3 With Reflex To ANCA Titer; Future  -     Celiac Disease Antibody Profile; Future  -     MISCELLANEOUS LAB TEST; Future  -     Lyme Antibody Profile with reflex to WB; Future  -     IgG, IgA, IgM; Future  -     Angiotensin converting enzyme; Future    Low TSH level  -     Urinalysis with microscopic  -     Protein / creatinine ratio, urine  -     C3 complement  -     C4 complement  -     C-reactive protein  -     Sedimentation rate, automated  -     RF Screen w/ Reflex to Titer; Future  -     Cyclic citrul peptide antibody, IgG  -     HLA-B27 antigen  -     Protein electrophoresis, serum; Future  -     TSH, 3rd generation with Free T4 reflex  -     Chronic Hepatitis Panel  -     ANCA Screen With MPO and PR3 With Reflex To ANCA Titer; Future  -     Celiac Disease Antibody Profile; Future  -     MISCELLANEOUS LAB TEST; Future  -     Lyme Antibody Profile with reflex to WB; Future  -     IgG, IgA, IgM; Future  -     Angiotensin converting enzyme; Future      Follow-up plan: 6 weeks       HPI  Jesse Munroe is a 25 y o   female who presents for rheumatology consult by request of Renae Nino from dermatology for +HERNÁN, +cardiolipin, +beta-2-glycoprotein, and interfaces changes on skin biopsy  Patient reports that her current issue started about 1 year ago in February 2018  Delivered her son in 2017, normal pregnancy and her son was healthy  She had 1 Miscarriage at 10 5 weeks in 2016; otherwise no other miscarriages  Her rash started after February 2018 on her elbows and knees  The rash is notably painful and itchy  In June of 2018 she noted rash developing on her bilateral toes which had a purple hue to it  She cut out gluten from her diet after this and reports the rash went away  She then reintroduced gluten in September of 2018 and noticed that the rash recurred with this, and so then again cut out gluten from her diet  However the 2nd time she did not feel that the rash improved and actually felt it progressively worsened throughout December and January  The rash was worse on her bilateral toes around new year's  She had a biopsy of the rash around her left knee through Dermatology which revealed nondiagnostic findings and nonspecific changes but included some interface changes that were noted  She did a course of prednisone which she completed about 1 and half weeks ago and reports that the rash resolved with the prednisone, and now that she has been off the rash is slowly starting to recur over the past 5 days  She now is noted small little papules on some of her fingers which is new  She is still currently eating gluten since she did not feel cutting it out the 2nd time improved her rash  She reports being diagnosed with irritable bowel syndrome in the past but has not had any recent significant abdominal or bowel issues  She has never had a colonoscopy  She denies any joint pain, swelling, or morning stiffness  She does not recall any new medication changes or infections that happened prior to the onset of the rash  Denies photosensitivity, psoriasis, sicca symptoms, oral or nasal ulcers, alopecia, Raynaud's, h/o pericarditis or pleurisy, h/o blood clots       Review of Systems  Review of Systems   Constitutional: Negative for chills, fatigue, fever and unexpected weight change  HENT: Negative for mouth sores and trouble swallowing  Eyes: Negative for pain and visual disturbance  Respiratory: Negative for cough and shortness of breath  Cardiovascular: Negative for chest pain and leg swelling  Gastrointestinal: Negative for abdominal pain, blood in stool, constipation, diarrhea and nausea  Genitourinary: Negative for hematuria  Musculoskeletal: Negative for arthralgias, back pain, joint swelling and myalgias  Skin: Positive for rash  Negative for color change  Neurological: Negative for weakness and numbness  Hematological: Negative for adenopathy  Psychiatric/Behavioral: Negative for sleep disturbance  Allergies  Allergies   Allergen Reactions    Penicillins Rash    Sulfa Antibiotics Rash       Home Medications  No current outpatient medications on file  Past Medical History  Past Medical History:   Diagnosis Date    Abnormal Pap smear of cervix     ASCUS    Anemia     age 13 took iron supplements x6 months    Migraine     age 13     Spontaneous      Last assessed: 16       Past Surgical History   Past Surgical History:   Procedure Laterality Date    MOUTH SURGERY      age 11    [place]  DELIVERY ONLY N/A 2017    Procedure:  SECTION (); Surgeon: Amparo Waite MD;  Location: [facility];  Service: Obstetrics    SHOULDER SURGERY      age 23    WISDOM TOOTH EXTRACTION         Family History  No known family history of autoimmune or inflammatory diseases      Family History   Problem Relation Age of Onset    Cancer Mother         tongue    Hypertension Mother         essential    Thyroid disease Mother    Nikki Marvel Hypothyroidism Father     Hypertension Father     Thyroid disease Father     Hypertension Sister     Migraines Sister         headaches    Cancer Maternal Grandmother     Hypertension Maternal Grandmother         essential    Breast cancer Maternal Grandmother     Thyroid disease Maternal Grandmother     Cancer Maternal Grandfather     Diabetes Maternal Grandfather     Hypertension Maternal Grandfather         essential    Lung cancer Maternal Grandfather     Cancer Paternal Grandmother     Uterine cancer Paternal Grandmother        Social History  Occupation:    Social History     Substance and Sexual Activity   Alcohol Use No     Social History     Substance and Sexual Activity   Drug Use No     Social History     Tobacco Use   Smoking Status Never Smoker   Smokeless Tobacco Never Used       Objective:    Vitals:    02/25/19 1002   BP: 120/74   BP Location: Left arm   Patient Position: Sitting   Cuff Size: Standard   Pulse: 64   Weight: 65 3 kg (144 lb)   Height: 5' 7" (1 702 m)       Physical Exam   Constitutional: She appears well-developed and well-nourished  No distress  HENT:   Head: Normocephalic  Mouth/Throat: Oropharynx is clear and moist and mucous membranes are normal    Eyes: Conjunctivae and EOM are normal  No scleral icterus  Neck: No spinous process tenderness and no muscular tenderness present  No thyromegaly present  Cardiovascular: Normal rate, regular rhythm, S1 normal and S2 normal  Exam reveals no friction rub  No murmur heard  Pulmonary/Chest: Effort normal and breath sounds normal  No respiratory distress  She has no wheezes  She has no rhonchi  She has no rales  Abdominal: Soft  She exhibits no distension  There is no hepatosplenomegaly  There is no tenderness  Musculoskeletal:   No significant soft tissue tenderness  No joint swelling, tenderness or synovitis  Lymphadenopathy:     She has no cervical adenopathy  Neurological: She is alert  She has normal strength  5/5 strength in bilateral upper and lower extremities  Skin: Skin is warm and dry  Nails show no clubbing     Minimal faint papules on extensor surfaces of elbows and knees, scattered on some fingers, and more prominent on the toes with some dry cracking skin; no inflamed or very active rash present  Increased erythema with acne-appearance on bilateral cheeks and nose without nasolabial fold sparing  Psychiatric: She has a normal mood and affect  Imaging:   No [facility] imaging for review     Pathology:  Pathology report reviewed from left knee punch biopsy on 01/25/2019 reveals focus of spongiosis, acanthosis, and interface changes  Pathology comments stated that these findings are nonspecific but raise a few possibilities  In the appropriate clinical setting viral exanthem is a consideration  The interface changes can be seen in connective tissue disorders like dermatomyositis  Finally, while no neutrophils are seen resolved dermatitis herpetiformis remains a possibility no hyphal fungal organisms are identified with PAS stain  Labs:   Labs from 02/08/2019 reveal:   WBC 8 hemoglobin 14 4 hematocrit 42 9 MCV 90 platelets 357 normal differential     BUN 11 creatinine 0 94 GFR 82 AST 16 ALT 26 alkaline phosphatase 53 albumin 5 total protein 8 1  Normal urinalysis without blood protein or white blood cells  Negative lupus anticoagulant  Low positive titer of anticardiolipin IgM of 28, and low positive titer of beta 2 glycoprotein IgM 33  Positive HERNÁN 1:80 speckled pattern  Negative Carlson, RNP, SSA, SSB, Scl-70, centromere, dsDNA, and Kelsie 1  Negative tissue transglutaminase antibodies  Normal CPK 32 and aldolase 9  Normal C3 136 and C4 19  Low TSH 0 276

## 2019-02-26 LAB
CREAT ?TM UR-SCNC: 10 UMOL/L
EXT PROTEIN URINE: 4
PROT/CREAT UR: 400 MG/G{CREAT}

## 2019-03-05 LAB
ACE SERPL-CCNC: 54 U/L (ref 14–82)
ALBUMIN SERPL ELPH-MCNC: 4.4 G/DL (ref 2.9–4.4)
ALBUMIN/GLOB SERPL: 1.3 {RATIO} (ref 0.7–1.7)
ALPHA1 GLOB SERPL ELPH-MCNC: 0.1 G/DL (ref 0–0.4)
ALPHA2 GLOB SERPL ELPH-MCNC: 0.7 G/DL (ref 0.4–1)
APPEARANCE UR: CLEAR
B BURGDOR IGG+IGM SER-ACNC: <0.91 ISR (ref 0–0.9)
B BURGDOR IGM SER IA-ACNC: <0.8 INDEX (ref 0–0.79)
B-GLOBULIN SERPL ELPH-MCNC: 1.1 G/DL (ref 0.7–1.3)
BACTERIA URNS QL MICRO: ABNORMAL
BILIRUB UR QL STRIP: NEGATIVE
C-ANCA TITR SER IF: NORMAL TITER
C3 SERPL-MCNC: 137 MG/DL (ref 82–167)
C4 SERPL-MCNC: 21 MG/DL (ref 14–44)
CCP IGA+IGG SERPL IA-ACNC: 4 UNITS (ref 0–19)
COLOR UR: YELLOW
CREAT UR-MCNC: 10 MG/DL
CRP SERPL-MCNC: <0.3 MG/L (ref 0–4.9)
EJ: NORMAL
ENA PM/SCL AB SER QL: NORMAL
ENA PM/SCL AB SER QL: NORMAL
ENA RNP AB SER-ACNC: 8.8 EU/ML
EPI CELLS #/AREA URNS HPF: ABNORMAL /HPF (ref 0–10)
ERYTHROCYTE [SEDIMENTATION RATE] IN BLOOD BY WESTERGREN METHOD: 5 MM/HR (ref 0–32)
GAMMA GLOB SERPL ELPH-MCNC: 1.4 G/DL (ref 0.4–1.8)
GLIADIN PEPTIDE IGA SER-ACNC: 4 UNITS (ref 0–19)
GLOBULIN SER CALC-MCNC: 3.3 G/DL (ref 2.2–3.9)
GLUCOSE UR QL: NEGATIVE
HAV IGM SERPL QL IA: NEGATIVE
HBV CORE IGM SERPL QL IA: NEGATIVE
HBV SURFACE AG SERPL QL IA: NEGATIVE
HCV AB S/CO SERPL IA: 0.1 S/CO RATIO (ref 0–0.9)
HGB UR QL STRIP: NEGATIVE
HLA-B27 QL NAA+PROBE: NEGATIVE
IGA SERPL-MCNC: 175 MG/DL (ref 87–352)
IGG SERPL-MCNC: 1230 MG/DL (ref 700–1600)
IGM SERPL-MCNC: 182 MG/DL (ref 26–217)
JO-1 (WB): NORMAL
KETONES UR QL STRIP: NEGATIVE
KU: NORMAL
LABORATORY COMMENT REPORT: NORMAL
LEUKOCYTE ESTERASE UR QL STRIP: NEGATIVE
M PROTEIN SERPL ELPH-MCNC: NORMAL G/DL
MI-2 ANTIBODIES: NORMAL
MICRO URNS: NORMAL
MICRO URNS: NORMAL
MYELOPEROXIDASE AB SER IA-ACNC: <9 U/ML (ref 0–9)
NITRITE UR QL STRIP: NEGATIVE
OJ: NORMAL
P-ANCA ATYPICAL TITR SER IF: NORMAL TITER
P-ANCA TITR SER IF: NORMAL TITER
PH UR STRIP: 6.5 [PH] (ref 5–7.5)
PL-12: NORMAL
PL-7: NORMAL
PROT SERPL-MCNC: 7.7 G/DL (ref 6–8.5)
PROT UR QL STRIP: NEGATIVE
PROT UR-MCNC: 4 MG/DL
PROT/CREAT UR: 400 MG/G CREAT (ref 0–200)
PROTEINASE3 AB SER IA-ACNC: <3.5 U/ML (ref 0–3.5)
RBC #/AREA URNS HPF: ABNORMAL /HPF (ref 0–2)
RHEUMATOID FACT SERPL-ACNC: <10 IU/ML (ref 0–13.9)
SJOGRENS SYNDROME-A EXTRACTABLE NUCLEAR 52KD AB [PRESENCE] IN SERUM: NORMAL
SP GR UR: 1.01 (ref 1–1.03)
SRP AB SERPL QL: NORMAL
TSH SERPL DL<=0.005 MIU/L-ACNC: 0.5 UIU/ML (ref 0.45–4.5)
TTG IGA SER-ACNC: <2 U/ML (ref 0–3)
UROBILINOGEN UR STRIP-ACNC: 0.2 EU/DL (ref 0.2–1)
WBC #/AREA URNS HPF: ABNORMAL /HPF (ref 0–5)

## 2019-04-08 ENCOUNTER — OFFICE VISIT (OUTPATIENT)
Dept: RHEUMATOLOGY | Facility: CLINIC | Age: 29
End: 2019-04-08
Payer: COMMERCIAL

## 2019-04-08 VITALS
HEART RATE: 68 BPM | HEIGHT: 67 IN | DIASTOLIC BLOOD PRESSURE: 76 MMHG | BODY MASS INDEX: 22.91 KG/M2 | WEIGHT: 146 LBS | SYSTOLIC BLOOD PRESSURE: 116 MMHG

## 2019-04-08 DIAGNOSIS — L30.8 INTERFACE DERMATITIS: ICD-10-CM

## 2019-04-08 DIAGNOSIS — R76.8 ANA POSITIVE: Primary | ICD-10-CM

## 2019-04-08 DIAGNOSIS — R76.0 POSITIVE CARDIOLIPIN ANTIBODIES: ICD-10-CM

## 2019-04-08 PROCEDURE — 99214 OFFICE O/P EST MOD 30 MIN: CPT | Performed by: INTERNAL MEDICINE

## 2019-05-08 LAB
APTT PPP: 28 SEC (ref 24–33)
BUN SERPL-MCNC: 10 MG/DL (ref 6–20)
BUN/CREAT SERPL: 13 (ref 9–23)
CALCIUM SERPL-MCNC: 9.1 MG/DL (ref 8.7–10.2)
CHLORIDE SERPL-SCNC: 104 MMOL/L (ref 96–106)
CO2 SERPL-SCNC: 24 MMOL/L (ref 20–29)
CREAT SERPL-MCNC: 0.76 MG/DL (ref 0.57–1)
GLUCOSE SERPL-MCNC: 86 MG/DL (ref 65–99)
INR PPP: 1 (ref 0.8–1.2)
LABCORP COMMENT: NORMAL
POTASSIUM SERPL-SCNC: 4.4 MMOL/L (ref 3.5–5.2)
PROTHROMBIN TIME: 10.3 SEC (ref 9.1–12)
SL AMB EGFR AFRICAN AMERICAN: 122 ML/MIN/1.73
SL AMB EGFR NON AFRICAN AMERICAN: 106 ML/MIN/1.73
SODIUM SERPL-SCNC: 141 MMOL/L (ref 134–144)

## 2019-05-14 LAB
APTT HEX PL PPP: 8 SEC
APTT IMM NP PPP: NORMAL SEC
APTT PPP 1:1 SALINE: NORMAL SEC
APTT PPP: 28.1 SEC
B2 GLYCOPROT1 IGA SER-ACNC: <10 SAU
B2 GLYCOPROT1 IGA SER-ACNC: <9 GPI IGA UNITS (ref 0–25)
B2 GLYCOPROT1 IGG SER-ACNC: <10 SGU
B2 GLYCOPROT1 IGG SER-ACNC: <9 GPI IGG UNITS (ref 0–20)
B2 GLYCOPROT1 IGM SER-ACNC: <10 SMU
B2 GLYCOPROT1 IGM SER-ACNC: <9 GPI IGM UNITS (ref 0–32)
BASOPHILS # BLD AUTO: 0 X10E3/UL (ref 0–0.2)
BASOPHILS NFR BLD AUTO: 1 %
CARDIOLIPIN IGG SER IA-ACNC: <10 GPL
CARDIOLIPIN IGG SER IA-ACNC: <9 GPL U/ML (ref 0–14)
CARDIOLIPIN IGM SER IA-ACNC: 10 MPL U/ML (ref 0–12)
CARDIOLIPIN IGM SER IA-ACNC: 12 MPL
CONFIRM DRVVT: NORMAL SEC
DRVVT SCREEN TO CONFIRM RATIO: NORMAL RATIO
EOSINOPHIL # BLD AUTO: 0.1 X10E3/UL (ref 0–0.4)
EOSINOPHIL NFR BLD AUTO: 1 %
ERYTHROCYTE [DISTWIDTH] IN BLOOD BY AUTOMATED COUNT: 13.4 % (ref 12.3–15.4)
HCT VFR BLD AUTO: 41.1 % (ref 34–46.6)
HGB BLD-MCNC: 14.1 G/DL (ref 11.1–15.9)
IMM GRANULOCYTES # BLD: 0 X10E3/UL (ref 0–0.1)
IMM GRANULOCYTES NFR BLD: 0 %
INR PPP: 1 RATIO
LA NT PLATELET PPP: 0 SEC
LA PPP-IMP: NORMAL
LYMPHOCYTES # BLD AUTO: 2.4 X10E3/UL (ref 0.7–3.1)
LYMPHOCYTES NFR BLD AUTO: 37 %
MCH RBC QN AUTO: 29.9 PG (ref 26.6–33)
MCHC RBC AUTO-ENTMCNC: 34.3 G/DL (ref 31.5–35.7)
MCV RBC AUTO: 87 FL (ref 79–97)
MONOCYTES # BLD AUTO: 0.4 X10E3/UL (ref 0.1–0.9)
MONOCYTES NFR BLD AUTO: 6 %
NEUTROPHILS # BLD AUTO: 3.6 X10E3/UL (ref 1.4–7)
NEUTROPHILS NFR BLD AUTO: 55 %
PLATELET # BLD AUTO: 259 X10E3/UL (ref 150–379)
PROTHROMBIN TIME: 10.5 SEC
RBC # BLD AUTO: 4.72 X10E6/UL (ref 3.77–5.28)
SCREEN DRVVT: 35.1 SEC
THROMBIN TIME: 16.7 SEC
WBC # BLD AUTO: 6.5 X10E3/UL (ref 3.4–10.8)

## 2019-06-06 ENCOUNTER — CONSULT (OUTPATIENT)
Dept: HEMATOLOGY ONCOLOGY | Facility: CLINIC | Age: 29
End: 2019-06-06
Payer: COMMERCIAL

## 2019-06-06 VITALS
HEIGHT: 64 IN | WEIGHT: 145 LBS | RESPIRATION RATE: 16 BRPM | BODY MASS INDEX: 24.75 KG/M2 | TEMPERATURE: 97.2 F | SYSTOLIC BLOOD PRESSURE: 120 MMHG | DIASTOLIC BLOOD PRESSURE: 70 MMHG

## 2019-06-06 DIAGNOSIS — R76.0 POSITIVE CARDIOLIPIN ANTIBODIES: Primary | ICD-10-CM

## 2019-06-06 PROCEDURE — 99245 OFF/OP CONSLTJ NEW/EST HI 55: CPT | Performed by: INTERNAL MEDICINE

## 2019-06-06 RX ORDER — FEXOFENADINE HCL AND PSEUDOEPHEDRINE HCI 180; 240 MG/1; MG/1
1 TABLET, EXTENDED RELEASE ORAL DAILY
COMMUNITY
End: 2020-11-10 | Stop reason: ALTCHOICE

## 2019-08-08 ENCOUNTER — OFFICE VISIT (OUTPATIENT)
Dept: RHEUMATOLOGY | Facility: CLINIC | Age: 29
End: 2019-08-08
Payer: COMMERCIAL

## 2019-08-08 VITALS
HEART RATE: 62 BPM | DIASTOLIC BLOOD PRESSURE: 76 MMHG | BODY MASS INDEX: 25 KG/M2 | SYSTOLIC BLOOD PRESSURE: 118 MMHG | HEIGHT: 64 IN | WEIGHT: 146.4 LBS

## 2019-08-08 DIAGNOSIS — R21 RASH: ICD-10-CM

## 2019-08-08 DIAGNOSIS — R76.0 ANTIPHOSPHOLIPID ANTIBODY POSITIVE: Primary | ICD-10-CM

## 2019-08-08 DIAGNOSIS — L30.8 INTERFACE DERMATITIS: ICD-10-CM

## 2019-08-08 PROCEDURE — 99213 OFFICE O/P EST LOW 20 MIN: CPT | Performed by: INTERNAL MEDICINE

## 2019-08-08 NOTE — PROGRESS NOTES
Assessment and Plan:   Patient is a 47-year-old female who presents for rheumatology follow-up regarding previously positive HERNÁN with low titer positive cardiolipin and beta 2 glycoprotein antibodies, also with rash with some interface changes on skin biopsy  Thorough autoimmune evaluation only was revealing for the low positive anti phospholipid antibodies and there were no signs of systemic lupus  She does not have any clinical features to suggest lupus or other autoimmune disease at this time  Fortunately on repeat her anti phospholipid antibodies are now negative which is reassuring likely of left clinical relevance, as these can transiently become positive and negative depending on any current illness or environmental triggers  She did see Hematology for consult because of these findings and also her desire to become pregnant but they agree that it is reassuring that her antibodies are now negative  She has not had recurrence of her skin rash since completely cutting gluten out of her diet and this is the 2nd time she has had with a gluten free  She will continue on the gluten free diet since this does seem to be the culprit at this point  She does have any GI symptoms that would suggest celiac and seems her main manifestation at this time skin rash  She is holding off on GI evaluation the because she is asymptomatic but if she develops GI symptoms in the future after reintroducing gluten she may consider it at that for more definitive diagnosis  Otherwise she can return for follow-up in about 1 year to make sure there have been no changes or new concerns  She will also let me know in the meantime if there is anything new that comes up  Plan:  Diagnoses and all orders for this visit:    Antiphospholipid antibody positive    Interface dermatitis    Rash        Follow-up plan: 1 year or sooner if issues        Rheumatic Disease Summary  1   +HERNÁN, interface changes on skin biopsy   -Rheum eval 2/25/19 for +HERNÁN, +cardiolipin, +beta-2-glycoprotein, and rash with some interface changes on skin biopsy  No other clinical features or symptoms of autoimmune disease   -Rash onset 2/2018 on elbows and knees, painful/itchy, then on toes in 6/2018; some improvement with gluten-free but then recurred 1/2019  -Skin biopsy from knee 1/25/19 was nondiagnostic with nonspecific changes but included spongiosis, acanthosis, and interface changes, no neutrophils seen, possible viral exanthema vs CTD vs resolving dermatitis herpetiformis  -Rash resolved with course of prednisone and then started recurring off pred  -Developed small papules on her fingers 2/2019, different rash  -Labs 2/2019: +HERNÁN 80 speckled, +cardiolipin IgM 28, +beta-2-glycoprotein IgM 33; negative HERNÁN panel, LAC, celiac panel, ANCA, Hep B/C, Lyme, myositis panel, RF, CCP, HLA-B27, SPEP; low TSH 0 276 but normal on repeat 0 503; normal C3/4, ACE 54, ESR 5, CRP<3, IGs, CPK 32, aldolase 9, UA, CMP, CBC  2  +Cardiolipin IgM, +Beta-2-glycoprotein  -Low titers of 28 and 33 x1, need repeating mid-May 2019  -Repeat labs 5/9/19: negative cardiolipin and beta-2-glycoprotein, negative LAC  -No qualifying event of thrombosis or pregnancy complications to diagnose APS  -1 miscarriage at 10 weeks, 1 normal healthy pregnancy   -Hematology eval 6/2019: repeat APL testing is negative, recd low dose aspirin QOD, consider GI eval for celiac     HPI  Valdez Vanessa is a 30 y o   female who presents for rheumatology follow-up regarding previous skin rash with some interface changes on biopsy along with low positive anti phospholipid antibodies  Since last visit we repeated those 12 weeks after the initial testing and they are now negative  She also saw Hematology in the meantime who reassured her that these are now negative and so there last concerning  We pursue the hematology consult because she is going to be planning pregnancy in the near future    She reports that since she was here she has been doing well  Since June over the past 8 weeks she has completely cut out gluten out of her diet and has not had any signs of the rash  She does feel it is likely related at this point since she previously also improve last year after she cut gluten now  She denies any GI symptoms when she eats gluten and has only had the rash  Her grandmother has celiac disease  She is adjusting well to the gluten free diet  She denies any new changes such as joint pain, swelling, or Raynaud's  She is still thinking of becoming pregnant in the near future but no definite plans yet  The following portions of the patient's history were reviewed and updated as appropriate: allergies, current medications, past family history, past medical history, past social history, past surgical history and problem list     Review of Systems:   Review of Systems   Constitutional: Negative for chills, fatigue, fever and unexpected weight change  HENT: Negative for mouth sores and trouble swallowing  Eyes: Negative for pain and visual disturbance  Respiratory: Negative for cough and shortness of breath  Cardiovascular: Negative for chest pain and leg swelling  Gastrointestinal: Negative for abdominal pain, blood in stool, constipation, diarrhea and nausea  Genitourinary: Negative for hematuria  Musculoskeletal: Negative for arthralgias, back pain, joint swelling and myalgias  Skin: Negative for rash  Neurological: Negative for weakness and numbness  Hematological: Negative for adenopathy  Psychiatric/Behavioral: Negative for sleep disturbance         Home Medications:    Current Outpatient Medications:     fexofenadine-pseudoephedrine (ALLEGRA-D 24) 180-240 MG per 24 hr tablet, Take 1 tablet by mouth daily, Disp: , Rfl:     Objective:    Vitals:    08/08/19 1117   BP: 118/76   Pulse: 62   Weight: 66 4 kg (146 lb 6 4 oz)   Height: 5' 4" (1 626 m)       Physical Exam   Constitutional: She appears well-developed and well-nourished  She is cooperative  HENT:   Head: Normocephalic and atraumatic  Eyes: Conjunctivae and EOM are normal  No scleral icterus  Neck: No spinous process tenderness and no muscular tenderness present  No thyromegaly present  Musculoskeletal:   No reproducible soft tissue or joint tenderness  No joint swelling or synovitis anywhere  Lymphadenopathy:     She has no cervical adenopathy  Neurological: She is alert  No sensory deficit  Skin: Skin is warm and dry  No rash noted  Nails show no clubbing  Psychiatric: She has a normal mood and affect       Labs:   Component      Latest Ref Rng & Units 5/9/2019          11:33 AM   Protime      sec 10 5   INR      ratio 1 0   aPTT      sec 28 1   aPTT 1:1 Normal Plasma      sec CANCELED   aPTT 1:1 Mix Saline      sec CANCELED   THROMBIN TIME      sec 16 7   DRVVT Screen Seconds      sec 35 1   DRVVT Confirm Seconds      sec CANCELED   DRVVT Ratio      ratio CANCELED   Hex Phosph Neut Test      sec 8   Platelet Neutralization Test      sec 0 0   ANTICARDIOLIPIN IGG ANTIBODY      GPL <10   ANTICARDIOLIPIN IGM ANTIBODY      MPL 12   BETA-2 GLYCOPROTEIN 1 IGG ANTIBODY      SGU <10   BETA-2 GLYCOPROTEIN 1 IGM ANTIBODY      SMU <10   BETA-2 GLYCOPROTEIN 1 IGA ANTIBODY      HEIDI <10   LUPUS REFLEX INTERPRETATION       No LAC detected

## 2019-11-05 ENCOUNTER — ANNUAL EXAM (OUTPATIENT)
Dept: OBGYN CLINIC | Facility: MEDICAL CENTER | Age: 29
End: 2019-11-05
Payer: COMMERCIAL

## 2019-11-05 VITALS — SYSTOLIC BLOOD PRESSURE: 120 MMHG | WEIGHT: 149.2 LBS | BODY MASS INDEX: 25.61 KG/M2 | DIASTOLIC BLOOD PRESSURE: 70 MMHG

## 2019-11-05 DIAGNOSIS — Z01.419 ENCOUNTER FOR WELL WOMAN EXAM WITH ROUTINE GYNECOLOGICAL EXAM: Primary | ICD-10-CM

## 2019-11-05 PROCEDURE — S0612 ANNUAL GYNECOLOGICAL EXAMINA: HCPCS | Performed by: OBSTETRICS & GYNECOLOGY

## 2019-11-05 NOTE — PROGRESS NOTES
ASSESSMENT & PLAN: Oly Luz is a 30 y o   with normal gynecologic exam     1   Routine well woman exam done today  2  Pap and HPV:  The patient's last pap was   It was normal     Pap and cotesting was not done today  Current ASCCP Guidelines reviewed  Due 2020  3  The following were reviewed in today's visit: breast self exam   4  Cycles regular for past 6 months    CC:  Annual Gynecologic Examination    HPI: Oly Luz is a 30 y o   who presents for annual gynecologic examination  She has the following concerns:  No GYN complaints; Health Maintenance:    She wears her seatbelt routinely  She does perform regular monthly self breast exams  She feels safe at home  Past Medical History:   Diagnosis Date    Abnormal Pap smear of cervix     ASCUS    Anemia     age 13 took iron supplements x6 months    Migraine     age 13     Spontaneous      Last assessed: 16       Past Surgical History:   Procedure Laterality Date    MOUTH SURGERY      age 11    [place]  DELIVERY ONLY N/A 2017    Procedure:  SECTION (); Surgeon: Lucia Covarrubias MD;  Location: [facility];  Service: Obstetrics    SHOULDER SURGERY      age 23    WISDOM TOOTH EXTRACTION         Past OB/Gyn History:  OB History        2    Para   1    Term   1       0    AB   1    Living   1       SAB   1    TAB   0    Ectopic   0    Multiple   0    Live Births   1               Pt has menstrual issues  See above   History of sexually transmitted infection: No   History of abnormal pap smears: No      Patient is currently sexually active  heterosexual   The current method of family planning is none      Family History   Problem Relation Age of Onset    Cancer Mother         tongue    Hypertension Mother         essential    Thyroid disease Mother    Dimitris Saab Hypothyroidism Father     Hypertension Father     Thyroid disease Father     Hypertension Sister  Migraines Sister         headaches    Cancer Maternal Grandmother     Hypertension Maternal Grandmother         essential    Breast cancer Maternal Grandmother     Thyroid disease Maternal Grandmother     Cancer Maternal Grandfather     Diabetes Maternal Grandfather     Hypertension Maternal Grandfather         essential    Lung cancer Maternal Grandfather     Cancer Paternal Grandmother     Uterine cancer Paternal Grandmother        Social History:  Social History     Socioeconomic History    Marital status: /Civil Union     Spouse name: Not on file    Number of children: Not on file    Years of education: Not on file    Highest education level: Not on file   Occupational History    Not on file   Social Needs    Financial resource strain: Not on file    Food insecurity:     Worry: Not on file     Inability: Not on file    Transportation needs:     Medical: Not on file     Non-medical: Not on file   Tobacco Use    Smoking status: Never Smoker    Smokeless tobacco: Never Used   Substance and Sexual Activity    Alcohol use: Yes     Frequency: 2-3 times a week     Drinks per session: 1 or 2    Drug use: No    Sexual activity: Yes     Partners: Male     Birth control/protection: None   Lifestyle    Physical activity:     Days per week: Not on file     Minutes per session: Not on file    Stress: Not on file   Relationships    Social connections:     Talks on phone: Not on file     Gets together: Not on file     Attends [employer] service: Not on file     Active member of club or organization: Not on file     Attends meetings of clubs or organizations: Not on file     Relationship status: Not on file    Intimate partner violence:     Fear of current or ex partner: Not on file     Emotionally abused: Not on file     Physically abused: Not on file     Forced sexual activity: Not on file   Other Topics Concern    Not on file   Social History Narrative    Attempting to conceive Allergies   Allergen Reactions    Penicillins Rash    Sulfa Antibiotics Rash         Current Outpatient Medications:     fexofenadine-pseudoephedrine (ALLEGRA-D 24) 180-240 MG per 24 hr tablet, Take 1 tablet by mouth daily, Disp: , Rfl:       Review of Systems  Constitutional :no fever, feels well, no tiredness, no recent weight gain or loss  ENT: no ear ache, no loss of hearing, no nosebleeds or nasal discharge, no sore throat or hoarseness  Cardiovascular: no complaints of slow or fast heart beat, no chest pain, no palpitations, no leg claudication or lower extremity edema  Respiratory: no complaints of shortness of shortness of breath, no FISHER  Breasts:no complaints of breast pain, breast lump, or nipple discharge  Gastrointestinal: no complaints of abdominal pain, constipation, nausea, vomiting, or diarrhea or bloody stools  Genitourinary : no complaints of dysuria, incontinence, pelvic pain, no dysmenorrhea, vaginal discharge or abnormal vaginal bleeding and as noted in HPI  Musculoskeletal: no complaints of arthralgia, no myalgia, no joint swelling or stiffness, no limb pain or swelling    Integumentary: no complaints of skin rash or lesion, itching or dry skin  Neurological: no complaints of headache, no confusion, no numbness or tingling, no dizziness or fainting    Objective      /70   Wt 67 7 kg (149 lb 3 2 oz)   LMP 10/27/2019   BMI 25 61 kg/m²   General:   appears stated age, cooperative, alert normal mood and affect   Neck: normal, supple,trachea midline, no masses   Heart: regular rate and rhythm, S1, S2 normal, no murmur, click, rub or gallop   Lungs: clear to auscultation bilaterally   Breasts: normal appearance, no masses or tenderness, Inspection negative, No nipple retraction or dimpling, No nipple discharge or bleeding, No axillary or supraclavicular adenopathy, Normal to palpation without dominant masses   Abdomen: soft, non-tender, without masses or organomegaly   Vulva: normal female genitalia, Bartholin's, Urethra, Stewardson normal, no lesions, normal female hair distribution   Vagina: normal vagina, no discharge, exudate, lesion, or erythema   Urethra: normal   Cervix: Normal, no discharge  Uterus: normal size, contour, position, consistency, mobility, non-tender   Adnexa: normal adnexa and no mass, fullness, tenderness   Lymphatic palpation of lymph nodes in neck, axilla, groin and/or other locations: no lymphadenopathy or masses noted   Skin normal skin turgor and no rashes     Psychiatric orientation to person, place, and time: normal  mood and affect: normal

## 2019-11-05 NOTE — PATIENT INSTRUCTIONS
Thank you for your confidence in our team    We appreciate you and welcome your feedback  If you receive a survey from us, please take a few moments to let us know how we are doing     Sincerely,   Christi Albright MD

## 2020-08-31 ENCOUNTER — OFFICE VISIT (OUTPATIENT)
Dept: OBGYN CLINIC | Facility: CLINIC | Age: 30
End: 2020-08-31
Payer: COMMERCIAL

## 2020-08-31 VITALS
BODY MASS INDEX: 24.41 KG/M2 | DIASTOLIC BLOOD PRESSURE: 70 MMHG | TEMPERATURE: 98 F | WEIGHT: 142.2 LBS | SYSTOLIC BLOOD PRESSURE: 120 MMHG

## 2020-08-31 DIAGNOSIS — Z78.9 ATTEMPTING TO CONCEIVE: Primary | ICD-10-CM

## 2020-08-31 DIAGNOSIS — N89.8 VAGINAL IRRITATION: ICD-10-CM

## 2020-08-31 PROCEDURE — 99214 OFFICE O/P EST MOD 30 MIN: CPT | Performed by: OBSTETRICS & GYNECOLOGY

## 2020-08-31 RX ORDER — DOCOSAHEXAENOIC ACID 200 MG
CAPSULE ORAL
COMMUNITY

## 2020-08-31 NOTE — PROGRESS NOTES
Assessment Diagnoses and all orders for this visit:    Attempting to conceive  -     CBC and differential; Future  -     Follicle stimulating hormone; Future  -     TSH, 3rd generation; Future  -     CBC and differential  -     Follicle stimulating hormone  -     TSH, 3rd generation    Vaginal irritation  -     VAGINOSIS DNA PROBE (AFFIRM)         Plan  Will check labs and culture  Follow OPKs  Consider [facility] referral    Porter   JENNIFER Lehman is a 26 y o  female  here for a discussion regarding getting pregnant  Patient and  have been trying for 1 year  At one point, they stopped trying so she could get worked up for autoimmune disorder  Was diagnosed with Celiac and given okay to start trying again  Patient states her first pregnancy was a ROBBY  She got pregnant after being diagnosed and treated for BV  At that time, she did not have symptoms  Wondering if she could be experiencing the same at this time  Patient occasionally noticed symptoms of ovulation  Knows how to follow OPKs and has intercourse every 2-3 days during her fertile times  We did discuss referral to [facility] at any time, now that she has actively tried for 1 year  Will obtain more OPKs and have intercourse more often around the time of her positive test      Patient checked for BV and baseline labs  Patient Active Problem List   Diagnosis    Status post primary low transverse  section    Positive cardiolipin antibodies       Gynecologic History  Patient's last menstrual period was 08/15/2020  The current method of family planning is none      Past Medical History:   Diagnosis Date    Abnormal Pap smear of cervix     ASCUS    Anemia     age 13 took iron supplements x6 months    Migraine     age 13     Spontaneous      Last assessed: 16     Past Surgical History:   Procedure Laterality Date    MOUTH SURGERY      age 11    [place]  DELIVERY ONLY N/A 2017    Procedure:  SECTION ();   Surgeon: Shannon Frazier MD;  Location: [facility];  Service: Obstetrics    SHOULDER SURGERY      age 23    [de-identified] TOOTH EXTRACTION       Family History   Problem Relation Age of Onset   Alonzo Trinh Cancer Mother         tongue    Hypertension Mother         essential    Thyroid disease Mother     Hypothyroidism Father     Hypertension Father     Thyroid disease Father     Hypertension Sister     Migraines Sister         headaches    Cancer Maternal Grandmother     Hypertension Maternal Grandmother         essential    Breast cancer Maternal Grandmother     Thyroid disease Maternal Grandmother     Cancer Maternal Grandfather     Diabetes Maternal Grandfather     Hypertension Maternal Grandfather         essential    Lung cancer Maternal Grandfather     Cancer Paternal Grandmother     Uterine cancer Paternal Grandmother      Social History     Socioeconomic History    Marital status: /Civil Union     Spouse name: Not on file    Number of children: Not on file    Years of education: Not on file    Highest education level: Not on file   Occupational History    Not on file   Social Needs    Financial resource strain: Not on file    Food insecurity     Worry: Not on file     Inability: Not on file    Transportation needs     Medical: Not on file     Non-medical: Not on file   Tobacco Use    Smoking status: Never Smoker    Smokeless tobacco: Never Used   Substance and Sexual Activity    Alcohol use: Yes     Frequency: 2-3 times a week     Drinks per session: 1 or 2    Drug use: No    Sexual activity: Yes     Partners: Male     Birth control/protection: None   Lifestyle    Physical activity     Days per week: Not on file     Minutes per session: Not on file    Stress: Not on file   Relationships    Social connections     Talks on phone: Not on file     Gets together: Not on file     Attends Muslim service: Not on file     Active member of club or organization: Not on file     Attends meetings of clubs or organizations: Not on file     Relationship status: Not on file    Intimate partner violence     Fear of current or ex partner: Not on file     Emotionally abused: Not on file     Physically abused: Not on file     Forced sexual activity: Not on file   Other Topics Concern    Not on file   Social History Narrative    Attempting to conceive     Allergies   Allergen Reactions    Penicillins Rash    Sulfa Antibiotics Rash       Current Outpatient Medications:     Ascorbic Acid (Vitamin C) 500 MG CHEW, , Disp: , Rfl:     Docosahexaenoic Acid (PreNatal DHA) 200 MG CAPS, , Disp: , Rfl:     fexofenadine-pseudoephedrine (ALLEGRA-D 24) 180-240 MG per 24 hr tablet, Take 1 tablet by mouth daily, Disp: , Rfl:     Review of Systems  Constitutional :no fever, feels well, no tiredness, no recent weight gain or loss  ENT: no ear ache, no loss of hearing, no nosebleeds or nasal discharge, no sore throat or hoarseness  Cardiovascular: no complaints of slow or fast heart beat, no chest pain, no palpitations, no leg claudication or lower extremity edema  Respiratory: no complaints of shortness of shortness of breath, no FISHER  Breasts:no complaints of breast pain, breast lump, or nipple discharge  Gastrointestinal: no complaints of abdominal pain, constipation, nausea, vomiting, or diarrhea or bloody stools  Genitourinary : no complaints of dysuria, incontinence, pelvic pain, no dysmenorrhea, vaginal discharge or abnormal vaginal bleeding and as noted in HPI  Musculoskeletal: no complaints of arthralgia, no myalgia, no joint swelling or stiffness, no limb pain or swelling    Integumentary: no complaints of skin rash or lesion, itching or dry skin  Neurological: no complaints of headache, no confusion, no numbness or tingling, no dizziness or fainting     Objective     /70   Temp 98 °F (36 7 °C)   Wt 64 5 kg (142 lb 3 2 oz)   LMP 08/15/2020   BMI 24 41 kg/m²     General: appears stated age, cooperative, alert normal mood and affect   Neck: normal, supple,trachea midline, no masses   Heart: regular rate and rhythm, S1, S2 normal, no murmur, click, rub or gallop   Lungs: clear to auscultation bilaterally   Abdomen: soft, non-tender, without masses or organomegaly   Vulva: normal   Vagina: normal vagina, no discharge, exudate, lesion, or erythema and affirm taken   Urethra: normal   Cervix: Normal, no discharge  Uterus: normal size, contour, position, consistency, mobility, non-tender   Adnexa: normal adnexa and no mass, fullness, tenderness   Lymphatic palpation of lymph nodes in neck, axilla, groin and/or other locations: no lymphadenopathy or masses noted   Skin normal skin turgor and no rashes     Psychiatric orientation to person, place, and time: normal  mood and affect: normal

## 2020-09-01 LAB
CANDIDA RRNA VAG QL PROBE: NEGATIVE
G VAGINALIS RRNA GENITAL QL PROBE: NEGATIVE
T VAGINALIS RRNA GENITAL QL PROBE: NEGATIVE

## 2020-09-02 LAB
BASOPHILS # BLD AUTO: 0.1 X10E3/UL (ref 0–0.2)
BASOPHILS NFR BLD AUTO: 1 %
EOSINOPHIL # BLD AUTO: 0.1 X10E3/UL (ref 0–0.4)
EOSINOPHIL NFR BLD AUTO: 1 %
ERYTHROCYTE [DISTWIDTH] IN BLOOD BY AUTOMATED COUNT: 11.8 % (ref 11.7–15.4)
FSH SERPL-ACNC: 9 MIU/ML
HCT VFR BLD AUTO: 41.9 % (ref 34–46.6)
HGB BLD-MCNC: 14.2 G/DL (ref 11.1–15.9)
IMM GRANULOCYTES # BLD: 0 X10E3/UL (ref 0–0.1)
IMM GRANULOCYTES NFR BLD: 0 %
LYMPHOCYTES # BLD AUTO: 2.5 X10E3/UL (ref 0.7–3.1)
LYMPHOCYTES NFR BLD AUTO: 38 %
MCH RBC QN AUTO: 31.2 PG (ref 26.6–33)
MCHC RBC AUTO-ENTMCNC: 33.9 G/DL (ref 31.5–35.7)
MCV RBC AUTO: 92 FL (ref 79–97)
MONOCYTES # BLD AUTO: 0.5 X10E3/UL (ref 0.1–0.9)
MONOCYTES NFR BLD AUTO: 8 %
NEUTROPHILS # BLD AUTO: 3.3 X10E3/UL (ref 1.4–7)
NEUTROPHILS NFR BLD AUTO: 52 %
PLATELET # BLD AUTO: 247 X10E3/UL (ref 150–450)
RBC # BLD AUTO: 4.55 X10E6/UL (ref 3.77–5.28)
TSH SERPL DL<=0.005 MIU/L-ACNC: 0.81 UIU/ML (ref 0.45–4.5)
WBC # BLD AUTO: 6.4 X10E3/UL (ref 3.4–10.8)

## 2020-09-11 ENCOUNTER — OFFICE VISIT (OUTPATIENT)
Dept: RHEUMATOLOGY | Facility: CLINIC | Age: 30
End: 2020-09-11
Payer: COMMERCIAL

## 2020-09-11 VITALS
DIASTOLIC BLOOD PRESSURE: 80 MMHG | BODY MASS INDEX: 22.82 KG/M2 | WEIGHT: 142 LBS | HEIGHT: 66 IN | TEMPERATURE: 99.9 F | SYSTOLIC BLOOD PRESSURE: 122 MMHG

## 2020-09-11 DIAGNOSIS — R76.0 POSITIVE CARDIOLIPIN ANTIBODIES: ICD-10-CM

## 2020-09-11 DIAGNOSIS — R76.8 ANA POSITIVE: ICD-10-CM

## 2020-09-11 DIAGNOSIS — R76.0 ANTIPHOSPHOLIPID ANTIBODY POSITIVE: ICD-10-CM

## 2020-09-11 DIAGNOSIS — L30.8 INTERFACE DERMATITIS: Primary | ICD-10-CM

## 2020-09-11 DIAGNOSIS — G56.01 CARPAL TUNNEL SYNDROME ON RIGHT: ICD-10-CM

## 2020-09-11 PROCEDURE — 99214 OFFICE O/P EST MOD 30 MIN: CPT | Performed by: INTERNAL MEDICINE

## 2020-09-11 RX ORDER — FEXOFENADINE HCL 180 MG/1
180 TABLET ORAL DAILY
COMMUNITY
End: 2021-07-20

## 2020-09-11 NOTE — PROGRESS NOTES
Assessment and Plan:   Patient is a 42-year-old female who presents for rheumatology follow-up regarding previous skin biopsy with interface dermatitis, and previous low positive titers for HERNÁN, cardiolipin and beta 2 glycoprotein antibodies  Previously we did extensive workup for her abnormal labs which came back grossly unremarkable to suggest an underlying autoimmune disease such as lupus  I did have her see Hematology in the past and she also was wanting to become pregnant and she had the positive anti phospholipid antibodies, but on repeat these were negative  She continues to get a papular rash if she eats gluten and it seems we have found a clear correlation for that  She otherwise continues to have nonspecific erythema on her face, but no other obvious features to suggest a specific autoimmune diagnosis at this time  We discussed that since she is trying to conceive but having difficulty we will repeat a few labs to ensure nothing has changed in terms of an underlying autoimmune disease and she was agreeable with that plan  Otherwise we discussed I will plan to follow-up with her periodically to make sure there have been no changes or clear manifestations to suggest lupus and she was agreeable with that plan  If the labs return without significant concerns we will follow up in 1 year      Plan:  Diagnoses and all orders for this visit:    Interface dermatitis  -     HERNÁN Screen w/ Reflex to Titer/Pattern  -     Anti-DNA antibody, double-stranded  -     C3 complement  -     C4 complement  -     Comprehensive metabolic panel  -     Sjogren's Antibodies  -     Sm and Sm/RNP Antibodies  -     Urinalysis with microscopic  -     Anticardiolipin Ab, IgG/M, Qn  -     Lupus anticoagulant  -     Beta-2 glycoprotein antibodies    Antiphospholipid antibody positive  -     HERNÁN Screen w/ Reflex to Titer/Pattern  -     Anti-DNA antibody, double-stranded  -     C3 complement  -     C4 complement  -     Comprehensive metabolic panel  -     Sjogren's Antibodies  -     Sm and Sm/RNP Antibodies  -     Urinalysis with microscopic  -     Anticardiolipin Ab, IgG/M, Qn  -     Lupus anticoagulant  -     Beta-2 glycoprotein antibodies    HERNÁN positive  -     HERNÁN Screen w/ Reflex to Titer/Pattern  -     Anti-DNA antibody, double-stranded  -     C3 complement  -     C4 complement  -     Comprehensive metabolic panel  -     Sjogren's Antibodies  -     Sm and Sm/RNP Antibodies  -     Urinalysis with microscopic  -     Anticardiolipin Ab, IgG/M, Qn  -     Lupus anticoagulant  -     Beta-2 glycoprotein antibodies    Positive cardiolipin antibodies  -     HERNÁN Screen w/ Reflex to Titer/Pattern  -     Anti-DNA antibody, double-stranded  -     C3 complement  -     C4 complement  -     Comprehensive metabolic panel  -     Sjogren's Antibodies  -     Sm and Sm/RNP Antibodies  -     Urinalysis with microscopic  -     Anticardiolipin Ab, IgG/M, Qn  -     Lupus anticoagulant  -     Beta-2 glycoprotein antibodies    Carpal tunnel syndrome on right    Other orders  -     fexofenadine (ALLEGRA) 180 MG tablet; Take 180 mg by mouth daily        Follow-up plan: 1 year        Rheumatic Disease Summary  1  +HERNÁN, interface changes on skin biopsy   -Rheum eval 2/25/19 for +HERNÁN, +cardiolipin, +beta-2-glycoprotein, and rash with some interface changes on skin biopsy   No other clinical features or symptoms of autoimmune disease   -Rash onset 2/2018 on elbows and knees, painful/itchy, then on toes in 6/2018; some improvement with gluten-free but then recurred 1/2019  -Skin biopsy from knee 1/25/19 was nondiagnostic with nonspecific changes but included spongiosis, acanthosis, and interface changes, no neutrophils seen, possible viral exanthema vs CTD vs resolving dermatitis herpetiformis  -Rash resolved with course of prednisone and then started recurring off pred  -Developed small papules on her fingers 2/2019, different rash  -Labs 2/2019: +HERNÁN 80 speckled, +cardiolipin IgM 28, +beta-2-glycoprotein IgM 33; negative HERNÁN panel, LAC, celiac panel, ANCA, Hep B/C, Lyme, myositis panel, RF, CCP, HLA-B27, SPEP; low TSH 0 276 but normal on repeat 0 503; normal C3/4, ACE 54, ESR 5, CRP<3, IGs, CPK 32, aldolase 9, UA, CMP, CBC  -Visit 9/11/20: no concerning signs/sx of AI disease, rash still resolved with gluten-free diet, having difficulty conceiving, recheck some labs given previous interface dermatitis on biopsy and prior abnormal labs   2  +Cardiolipin IgM, +Beta-2-glycoprotein  -Low titers of 28 and 33 x1, need repeating mid-May 2019  -Repeat labs 5/9/19: negative cardiolipin and beta-2-glycoprotein, negative LAC  -No qualifying event of thrombosis or pregnancy complications to diagnose APS  -1 miscarriage at 10 weeks, 1 normal healthy pregnancy   -Hematology eval 6/2019: repeat APL testing is negative, recd low dose aspirin QOD, consider GI eval for celiac     HPI  Britt Maharaj is a 26 y o   female who presents for rheumatology follow-up regarding previously positive HERNÁN, cardiolipin and beta 2 glycoprotein antibodies along with a rash that showed some possible interface changes on biopsy  Repeat of the antiphospholipid was negative for both cardiolipin and beta 2 glycoprotein  She also had resolution of the rash after she eliminated gluten from her diet  Patient reports that she overall has been doing well  She mentions that her right wrist has been bothering her lately and she feels it is related to working from home on her computer a lot  She does not get much numbness or tingling into the fingers but just mainly pain at the wrist   She has had similar issues with carpal tunnel in the past and so she already has a wrist splint at home that she does aware sometimes during the night but is not consistent  She does think it does help when she wears it  She is not having any issues with her left hand    She has occasional joint pain in the right shoulder and reports she had a previous surgery on that shoulder and so sometimes gets pain there  No other joint pain  She did have recurrence of the previous papular rash after she had gluten with her meal and so she does feel at this point there is a clear correlation to gluten intake and the rash appearing  She plans to continue eating mainly a gluten free diet  No other rashes  No recent oral sores, alopecia, pleuritic type chest pain, symptoms of Raynaud's  No joint swelling  She also reports that for the past 1 year her and  have been trying to get pregnant but not conceived thus far  She discussed this with her gynecologist who did some lab work which returned normal   She is planning to discuss this further in the fall at their visit  The following portions of the patient's history were reviewed and updated as appropriate: allergies, current medications, past family history, past medical history, past social history, past surgical history and problem list     Review of Systems:   Review of Systems   Constitutional: Negative for fatigue and unexpected weight change  HENT: Negative for mouth sores  Respiratory: Negative for cough and shortness of breath  Gastrointestinal: Negative for constipation and diarrhea  Musculoskeletal: Positive for arthralgias (right wrist and shoulder)  Negative for back pain, joint swelling and myalgias  Skin: Negative for color change and rash  Neurological: Negative for weakness  Psychiatric/Behavioral: Negative for sleep disturbance         Home Medications:    Current Outpatient Medications:     Ascorbic Acid (Vitamin C) 500 MG CHEW, , Disp: , Rfl:     Docosahexaenoic Acid (PreNatal DHA) 200 MG CAPS, , Disp: , Rfl:     fexofenadine (ALLEGRA) 180 MG tablet, Take 180 mg by mouth daily, Disp: , Rfl:     fexofenadine-pseudoephedrine (ALLEGRA-D 24) 180-240 MG per 24 hr tablet, Take 1 tablet by mouth daily, Disp: , Rfl:     Objective:    Vitals: 09/11/20 1047   BP: 122/80   BP Location: Left arm   Patient Position: Sitting   Cuff Size: Standard   Temp: 99 9 °F (37 7 °C)   TempSrc: Tympanic   Weight: 64 4 kg (142 lb)   Height: 5' 6" (1 676 m)       Physical Exam  Constitutional:       General: She is not in acute distress  Appearance: She is well-developed  HENT:      Head: Normocephalic and atraumatic  Eyes:      General: Lids are normal  No scleral icterus  Conjunctiva/sclera: Conjunctivae normal    Neck:      Musculoskeletal: Neck supple  Pulmonary:      Effort: Pulmonary effort is normal  No tachypnea, accessory muscle usage or respiratory distress  Musculoskeletal:      Comments: No joint swelling or synovitis anywhere  No reproducible soft tissue or joint tenderness  Skin:     General: Skin is dry  Findings: No rash  Neurological:      Mental Status: She is alert  Psychiatric:         Behavior: Behavior normal  Behavior is cooperative  Labs:   Component      Latest Ref Rng & Units 9/1/2020   White Blood Cell Count      3 4 - 10 8 x10E3/uL 6 4   Red Blood Cell Count      3 77 - 5 28 x10E6/uL 4 55   Hemoglobin      11 1 - 15 9 g/dL 14 2   HCT      34 0 - 46 6 % 41 9   MCV      79 - 97 fL 92   MCH      26 6 - 33 0 pg 31 2   MCHC        31 5 - 35 7 g/dL 33 9   RDW      11 7 - 15 4 % 11 8   Platelet Count      152 - 450 x10E3/uL 247   Neutrophils      Not Estab  % 52   Lymphocytes      Not Estab  % 38   Monocytes      Not Estab  % 8   Eosinophils      Not Estab  % 1   Basophils PCT      Not Estab  % 1   Neutrophils (Absolute)      1 4 - 7 0 x10E3/uL 3 3   Lymphocytes (Absolute)      0 7 - 3 1 x10E3/uL 2 5   Monocytes (Absolute)      0 1 - 0 9 x10E3/uL 0 5   Eosinophils (Absolute)      0 0 - 0 4 x10E3/uL 0 1   Basophils ABS      0 0 - 0 2 x10E3/uL 0 1   Immature Granulocytes      Not Estab  % 0   Immature Granulocytes (Absolute)      0 0 - 0 1 x10E3/uL 0 0   TSH, POC      0 450 - 4 500 uIU/mL 0 810

## 2020-09-23 LAB
ACTIN IGG SERPL-ACNC: 7 UNITS (ref 0–19)
ALBUMIN SERPL-MCNC: 4.8 G/DL (ref 3.8–4.8)
ALBUMIN/GLOB SERPL: 1.8 {RATIO} (ref 1.2–2.2)
ALP SERPL-CCNC: 47 IU/L (ref 39–117)
ALT SERPL-CCNC: 17 IU/L (ref 0–32)
ANA SER QL: NEGATIVE
ANA TITR SER IF: NEGATIVE {TITER}
APPEARANCE UR: CLEAR
APTT HEX PL PPP: 0 SEC
APTT IMM NP PPP: NORMAL SEC
APTT PPP 1:1 SALINE: NORMAL SEC
APTT PPP: 23.8 SEC
AST SERPL-CCNC: 17 IU/L (ref 0–40)
B2 GLYCOPROT1 IGA SER-ACNC: <10 SAU
B2 GLYCOPROT1 IGA SER-ACNC: <9 GPI IGA UNITS (ref 0–25)
B2 GLYCOPROT1 IGG SER-ACNC: <10 SGU
B2 GLYCOPROT1 IGG SER-ACNC: <9 GPI IGG UNITS (ref 0–20)
B2 GLYCOPROT1 IGM SER-ACNC: <10 SMU
B2 GLYCOPROT1 IGM SER-ACNC: <9 GPI IGM UNITS (ref 0–32)
BACTERIA URNS QL MICRO: ABNORMAL
BILIRUB SERPL-MCNC: 0.4 MG/DL (ref 0–1.2)
BILIRUB UR QL STRIP: NEGATIVE
BUN SERPL-MCNC: 10 MG/DL (ref 6–20)
BUN/CREAT SERPL: 10 (ref 9–23)
C3 SERPL-MCNC: 106 MG/DL (ref 82–167)
C4 SERPL-MCNC: 17 MG/DL (ref 14–44)
CALCIUM SERPL-MCNC: 9.4 MG/DL (ref 8.7–10.2)
CARDIOLIPIN IGA SER IA-ACNC: <9 APL U/ML (ref 0–11)
CARDIOLIPIN IGG SER IA-ACNC: <10 GPL
CARDIOLIPIN IGG SER IA-ACNC: <9 GPL U/ML (ref 0–14)
CARDIOLIPIN IGM SER IA-ACNC: 10 MPL U/ML (ref 0–12)
CARDIOLIPIN IGM SER IA-ACNC: <10 MPL
CHLORIDE SERPL-SCNC: 106 MMOL/L (ref 96–106)
CO2 SERPL-SCNC: 25 MMOL/L (ref 20–29)
COLOR UR: YELLOW
CONFIRM DRVVT: NORMAL SEC
CREAT SERPL-MCNC: 1.03 MG/DL (ref 0.57–1)
DRVVT SCREEN TO CONFIRM RATIO: NORMAL RATIO
DSDNA AB SER-ACNC: <1 IU/ML (ref 0–9)
ENA SS-A AB SER-ACNC: <0.2 AI (ref 0–0.9)
ENA SS-B AB SER-ACNC: <0.2 AI (ref 0–0.9)
EPI CELLS #/AREA URNS HPF: ABNORMAL /HPF (ref 0–10)
GLOBULIN SER-MCNC: 2.6 G/DL (ref 1.5–4.5)
GLUCOSE SERPL-MCNC: 95 MG/DL (ref 65–99)
GLUCOSE UR QL: NEGATIVE
HGB UR QL STRIP: NEGATIVE
INR PPP: 1 RATIO
KETONES UR QL STRIP: NEGATIVE
LA NT PLATELET PPP: 0 SEC
LA PPP-IMP: NORMAL
LABORATORY COMMENT REPORT: NORMAL
LEUKOCYTE ESTERASE UR QL STRIP: ABNORMAL
MICRO URNS: ABNORMAL
MUCOUS THREADS URNS QL MICRO: PRESENT
NITRITE UR QL STRIP: NEGATIVE
PH UR STRIP: 7.5 [PH] (ref 5–7.5)
POTASSIUM SERPL-SCNC: 4.2 MMOL/L (ref 3.5–5.2)
PROT SERPL-MCNC: 7.4 G/DL (ref 6–8.5)
PROT UR QL STRIP: NEGATIVE
PROTHROMBIN TIME: 10.7 SEC
RBC #/AREA URNS HPF: ABNORMAL /HPF (ref 0–2)
SCREEN DRVVT: 26.9 SEC
SL AMB EGFR AFRICAN AMERICAN: 83 ML/MIN/1.73
SL AMB EGFR NON AFRICAN AMERICAN: 72 ML/MIN/1.73
SODIUM SERPL-SCNC: 142 MMOL/L (ref 134–144)
SP GR UR: 1 (ref 1–1.03)
THROMBIN TIME: 17.6 SEC
UROBILINOGEN UR STRIP-ACNC: 0.2 MG/DL (ref 0.2–1)
WBC #/AREA URNS HPF: ABNORMAL /HPF (ref 0–5)

## 2020-11-10 ENCOUNTER — ANNUAL EXAM (OUTPATIENT)
Dept: OBGYN CLINIC | Facility: CLINIC | Age: 30
End: 2020-11-10
Payer: COMMERCIAL

## 2020-11-10 VITALS — DIASTOLIC BLOOD PRESSURE: 60 MMHG | WEIGHT: 146 LBS | BODY MASS INDEX: 23.57 KG/M2 | SYSTOLIC BLOOD PRESSURE: 114 MMHG

## 2020-11-10 DIAGNOSIS — Z78.9 ATTEMPTING TO CONCEIVE: ICD-10-CM

## 2020-11-10 DIAGNOSIS — Z01.419 ENCOUNTER FOR WELL WOMAN EXAM WITH ROUTINE GYNECOLOGICAL EXAM: Primary | ICD-10-CM

## 2020-11-10 PROCEDURE — S0612 ANNUAL GYNECOLOGICAL EXAMINA: HCPCS | Performed by: OBSTETRICS & GYNECOLOGY

## 2020-11-10 PROCEDURE — G0145 SCR C/V CYTO,THINLAYER,RESCR: HCPCS | Performed by: OBSTETRICS & GYNECOLOGY

## 2020-11-10 PROCEDURE — 87624 HPV HI-RISK TYP POOLED RSLT: CPT | Performed by: OBSTETRICS & GYNECOLOGY

## 2020-11-13 LAB
HPV HR 12 DNA CVX QL NAA+PROBE: NEGATIVE
HPV16 DNA CVX QL NAA+PROBE: NEGATIVE
HPV18 DNA CVX QL NAA+PROBE: NEGATIVE
LAB AP GYN PRIMARY INTERPRETATION: NORMAL
Lab: NORMAL

## 2021-05-04 NOTE — PATIENT INSTRUCTIONS
Pregnancy at 7 to [address]:   Changes happening to your body:  Pregnancy hormones may cause your body to go through many changes during this stage of your pregnancy  You may feel more tired than usual, and have mood swings, nausea and vomiting, and headaches  Your breasts may feel tender and swollen and you may urinate more frequently  Seek care immediately if:   · You have pain or cramping in your abdomen or low back  · You have heavy vaginal bleeding or clotting  · You pass material that looks like tissue or large clots  Collect the material and bring it with you  Call your doctor or obstetrician if:   · You have light bleeding  · You have chills or a fever  · You have vaginal itching, burning, or pain  · You have yellow, green, white, or foul-smelling vaginal discharge  · You have pain or burning when you urinate, less urine than usual, or pink or bloody urine  · You have questions or concerns about your condition or care  How to care for yourself at this stage of your pregnancy:   · Manage nausea and vomiting  Avoid fatty and spicy foods  Eat small meals throughout the day instead of large meals  Asya may help to decrease nausea  Ask your healthcare provider about other ways of decreasing nausea and vomiting  · Eat a variety of healthy foods  Healthy foods include fruits, vegetables, whole-grain breads, low-fat dairy foods, beans, lean meats, and fish  Drink liquids as directed  Ask how much liquid to drink each day and which liquids are best for you  Limit caffeine to less than 200 milligrams each day  Limit your intake of fish to 2 servings each week  Choose fish low in mercury such as canned light tuna, shrimp, salmon, cod, or tilapia  Do not  eat fish high in mercury such as swordfish, tilefish, manny mackerel, and shark  · Take prenatal vitamins as directed    Your need for certain vitamins and minerals, such as folic acid, increases during pregnancy  Prenatal vitamins provide some of the extra vitamins and minerals you need  Prenatal vitamins may also help to decrease the risk of certain birth defects  · Ask how much weight you should gain each month  Too much or too little weight gain can be unhealthy for you and your baby  · Do not smoke  Smoking increases your risk of a miscarriage and other health problems during your pregnancy  Smoking can cause your baby to be born too early or weigh less at birth  Quit smoking as soon as you think you might be pregnant  Ask your healthcare provider for information if you need help quitting  · Do not drink alcohol  Alcohol passes from your body to your baby through the placenta  It can affect your baby's brain development and cause fetal alcohol syndrome (FAS)  FAS is a group of conditions that causes mental, behavior, and growth problems  · Talk to your healthcare provider before you take any medicines  Many medicines may harm your baby if you take them when you are pregnant  Do not take any medicines, vitamins, herbs, or supplements without first talking to your healthcare provider  Never use illegal or street drugs (such as marijuana or cocaine) while you are pregnant  Safety tips during pregnancy:   · Avoid hot tubs and saunas  Do not use a hot tub or sauna while you are pregnant, especially during your first trimester  Hot tubs and saunas may raise your baby's temperature and increase the risk of birth defects  · Avoid toxoplasmosis  This is an infection caused by eating raw meat or being around infected cat feces  It can cause birth defects, miscarriages, and other problems  Wash your hands after you touch raw meat  Make sure any meat is well-cooked before you eat it  Avoid raw eggs and unpasteurized milk  Use gloves or ask someone else to clean your cat's litter box while you are pregnant      Changes that are happening with your baby:  By 10 weeks, your baby will be about 2½ inches long from the top of the head to the rump (baby's bottom)  Your baby weighs about ½ ounce  Major body organs, such as the brain, heart, and lungs, are forming  Your baby's facial features are also starting to form  Prenatal care:  Prenatal care is a series of visits with your healthcare provider throughout your pregnancy  During the first 28 weeks of your pregnancy, you will see your healthcare provider 1 time each month  Prenatal care can help prevent problems during pregnancy and childbirth  Your healthcare provider will check your blood pressure and weight  Your baby's heart rate will also be checked  You may also need the following at some visits:  · A pelvic exam  allows your healthcare provider to see your cervix (the bottom part of your uterus)  Your healthcare provider will use a speculum to open your vagina  He or she will check the size and shape of your uterus  You may also have a Pap smear at your first prenatal visit  This is a test to check your cervix for abnormal cells  · Blood tests  may be done to check for any of the following:     ? Gestational diabetes or anemia (low iron level)    ? Blood type or Rh factor, or certain birth defects    ? Immunity to certain diseases, such as chickenpox or rubella    ? An infection, such as a sexually transmitted infection, HIV, or hepatitis B    · Hepatitis B  may need to be prevented or treated  Hepatitis B is inflammation of the liver caused by the hepatitis B virus (HBV)  HBV can spread from a mother to her baby during delivery  You will be checked for HBV as early as possible in the first trimester of each pregnancy  You need the test even if you received the hepatitis B vaccine or were tested before  You may need to have an HBV infection treated before you give birth  · Urine tests  may also be done to check for sugar and protein  These can be signs of gestational diabetes or preeclampsia   Urine tests may also be done to check for signs of infection  · A fetal ultrasound  shows pictures of your baby inside your uterus  The pictures are used to check your baby's development, movement, and position  · Genetic disorder screening tests  may be offered to you  These screening tests check your baby's risk for genetic disorders such as Down syndrome  A screening test includes a blood test and ultrasound  Follow up with your doctor or obstetrician as directed:  Go to all prenatal visits  Write down your questions so you remember to ask them during your visits  © Copyright [address] Information is for End User's use only and may not be sold, redistributed or otherwise used for commercial purposes  All illustrations and images included in CareNotes® are the copyrighted property of A D A M , Inc  or [address] [place], [place] [address]christiane   The above information is an  only  It is not intended as medical advice for individual conditions or treatments  Talk to your doctor, nurse or pharmacist before following any medical regimen to see if it is safe and effective for you  Pregnancy at 11 to 14 Weeks   AMBULATORY CARE:   Changes happening to your body: You are now at the end of your first trimester and entering your second trimester  Morning sickness usually goes away by this time  You may have other symptoms such as fatigue, frequent urination, and headaches  You may have gained 2 to 4 pounds by now  Seek care immediately if:   · You have pain or cramping in your abdomen or low back  · You have heavy vaginal bleeding or clotting  · You pass material that looks like tissue or large clots  Collect the material and bring it with you  Call your doctor or obstetrician if:   · You cannot keep food or drinks down, and you are losing weight  · You have light vaginal bleeding  · You have chills or a fever  · You have vaginal itching, burning, or pain  · You have yellow, green, white, or foul-smelling vaginal discharge      · You have pain or burning when you urinate, less urine than usual, or pink or bloody urine  · You have questions or concerns about your condition or care  How to care for yourself at this stage of your pregnancy:   · Get plenty of rest   You may feel more tired than normal  You may need to take naps or go to bed earlier  · Manage nausea and vomiting  Avoid fatty and spicy foods  Eat small meals throughout the day instead of large meals  Asya may help to decrease nausea  Ask your healthcare provider about other ways of decreasing nausea and vomiting  · Eat a variety of healthy foods  Healthy foods include fruits, vegetables, whole-grain breads, low-fat dairy foods, beans, lean meats, and fish  Drink liquids as directed  Ask how much liquid to drink each day and which liquids are best for you  Limit caffeine to less than 200 milligrams each day  Limit your intake of fish to 2 servings each week  Choose fish low in mercury such as canned light tuna, shrimp, salmon, cod, or tilapia  Do not  eat fish high in mercury such as swordfish, tilefish, manny mackerel, and shark  · Take prenatal vitamins as directed  Your need for certain vitamins and minerals, such as folic acid, increases during pregnancy  Prenatal vitamins provide some of the extra vitamins and minerals you need  Prenatal vitamins may also help to decrease the risk of certain birth defects  · Do not smoke  Smoking increases your risk of a miscarriage and other health problems during your pregnancy  Smoking can cause your baby to be born too early or weigh less at birth  Ask your healthcare provider for information if you need help quitting  · Do not drink alcohol  Alcohol passes from your body to your baby through the placenta  It can affect your baby's brain development and cause fetal alcohol syndrome (FAS)  FAS is a group of conditions that causes mental, behavior, and growth problems       · Talk to your healthcare provider before you take any medicines  Many medicines may harm your baby if you take them when you are pregnant  Do not take any medicines, vitamins, herbs, or supplements without first talking to your healthcare provider  Never use illegal or street drugs (such as marijuana or cocaine) while you are pregnant  Safety tips during pregnancy:   · Avoid hot tubs and saunas  Do not use a hot tub or sauna while you are pregnant, especially during your first trimester  Hot tubs and saunas may raise your baby's temperature and increase the risk of birth defects  · Avoid toxoplasmosis  This is an infection caused by eating raw meat or being around infected cat feces  It can cause birth defects, miscarriages, and other problems  Wash your hands after you touch raw meat  Make sure any meat is well-cooked before you eat it  Avoid raw eggs and unpasteurized milk  Use gloves or ask someone else to clean your cat's litter box while you are pregnant  Changes happening with your baby: Your baby has fully formed fingernails and toenails  Your baby's heartbeat can now be heard  Ask your healthcare provider if you can listen to your baby's heartbeat  By week 14, your baby is over 4 inches long from the top of the head to the rump (baby's bottom)  Your baby weighs over 3 ounces  Prenatal care:  Prenatal care is a series of visits with your healthcare provider throughout your pregnancy  During the first 28 weeks of your pregnancy, you will see your healthcare provider 1 time each month  Prenatal care can help prevent problems during pregnancy and childbirth  Your healthcare provider will check your blood pressure and weight  Your baby's heart rate will also be checked  You may also need the following at some visits:  · A pelvic exam  allows your healthcare provider to see your cervix (the bottom part of your uterus)  Your healthcare provider will use a speculum to open your vagina   He or she will check the size and shape of your uterus  · Blood tests  may be done to check for any of the following:     ? Gestational diabetes or anemia (low iron level)    ? Blood type or Rh factor, or certain birth defects    ? Immunity to certain diseases, such as chickenpox or rubella    ? An infection, such as a sexually transmitted infection, HIV, or hepatitis B    · Hepatitis B  may need to be prevented or treated  Hepatitis B is inflammation of the liver caused by the hepatitis B virus (HBV)  HBV can spread from a mother to her baby during delivery  You will be checked for HBV as early as possible in the first trimester of each pregnancy  You need the test even if you received the hepatitis B vaccine or were tested before  You may need to have an HBV infection treated before you give birth  · Urine tests  may also be done to check for sugar and protein  These can be signs of gestational diabetes or preeclampsia  Urine tests may also be done to check for signs of infection  · A fetal ultrasound  shows pictures of your baby inside your uterus  The pictures are used to check your baby's development, movement, and position  · Genetic disorder screening tests  may be offered to you  These tests check your baby's risk for genetic disorders such as Down syndrome  A screening test includes a blood test and ultrasound  Follow up with your doctor or obstetrician as directed:  Go to all prenatal visits  Write down your questions so you remember to ask them during your visits  © Copyright [address] Information is for End User's use only and may not be sold, redistributed or otherwise used for commercial purposes  All illustrations and images included in CareNotes® are the copyrighted property of A D A M , Inc  or [address] [place] [address] Catherine   The above information is an  only  It is not intended as medical advice for individual conditions or treatments   Talk to your doctor, nurse or pharmacist before following any medical regimen to see if it is safe and effective for you

## 2021-05-06 ENCOUNTER — INITIAL PRENATAL (OUTPATIENT)
Dept: OBGYN CLINIC | Facility: MEDICAL CENTER | Age: 31
End: 2021-05-06

## 2021-05-06 DIAGNOSIS — Z34.91 ENCOUNTER FOR PREGNANCY RELATED EXAMINATION IN FIRST TRIMESTER: Primary | ICD-10-CM

## 2021-05-06 PROCEDURE — OBC: Performed by: OBSTETRICS & GYNECOLOGY

## 2021-05-06 RX ORDER — CHOLECALCIFEROL (VITAMIN D3) 50 MCG
TABLET ORAL
COMMUNITY

## 2021-05-13 LAB — EXTERNAL HIV SCREEN: NORMAL

## 2021-05-15 LAB
ABO GROUP BLD: NORMAL
APPEARANCE UR: CLEAR
BACTERIA UR CULT: NORMAL
BASOPHILS # BLD AUTO: 0 X10E3/UL (ref 0–0.2)
BASOPHILS NFR BLD AUTO: 0 %
BILIRUB UR QL STRIP: NEGATIVE
BLD GP AB SCN SERPL QL: NEGATIVE
COLOR UR: YELLOW
EOSINOPHIL # BLD AUTO: 0.1 X10E3/UL (ref 0–0.4)
EOSINOPHIL NFR BLD AUTO: 1 %
ERYTHROCYTE [DISTWIDTH] IN BLOOD BY AUTOMATED COUNT: 12.1 % (ref 11.7–15.4)
GLUCOSE UR QL: NEGATIVE
HBV SURFACE AG SERPL QL IA: NEGATIVE
HCT VFR BLD AUTO: 39.2 % (ref 34–46.6)
HGB BLD-MCNC: 13.2 G/DL (ref 11.1–15.9)
HGB UR QL STRIP: NEGATIVE
HIV 1+2 AB+HIV1 P24 AG SERPL QL IA: NON REACTIVE
IMM GRANULOCYTES # BLD: 0 X10E3/UL (ref 0–0.1)
IMM GRANULOCYTES NFR BLD: 1 %
KETONES UR QL STRIP: NEGATIVE
LEUKOCYTE ESTERASE UR QL STRIP: NEGATIVE
LYMPHOCYTES # BLD AUTO: 2 X10E3/UL (ref 0.7–3.1)
LYMPHOCYTES NFR BLD AUTO: 25 %
Lab: NO GROWTH
MCH RBC QN AUTO: 30.8 PG (ref 26.6–33)
MCHC RBC AUTO-ENTMCNC: 33.7 G/DL (ref 31.5–35.7)
MCV RBC AUTO: 91 FL (ref 79–97)
MICRO URNS: NORMAL
MONOCYTES # BLD AUTO: 0.8 X10E3/UL (ref 0.1–0.9)
MONOCYTES NFR BLD AUTO: 10 %
NEUTROPHILS # BLD AUTO: 5.2 X10E3/UL (ref 1.4–7)
NEUTROPHILS NFR BLD AUTO: 63 %
NITRITE UR QL STRIP: NEGATIVE
PH UR STRIP: 6 [PH] (ref 5–7.5)
PLATELET # BLD AUTO: 257 X10E3/UL (ref 150–450)
PROT UR QL STRIP: NEGATIVE
RBC # BLD AUTO: 4.29 X10E6/UL (ref 3.77–5.28)
RH BLD: POSITIVE
RPR SER QL: NON REACTIVE
RUBV IGG SERPL IA-ACNC: 6.76 INDEX
SP GR UR: 1.01 (ref 1–1.03)
UROBILINOGEN UR STRIP-ACNC: 0.2 MG/DL (ref 0.2–1)
WBC # BLD AUTO: 8.1 X10E3/UL (ref 3.4–10.8)

## 2021-05-24 ENCOUNTER — INITIAL PRENATAL (OUTPATIENT)
Dept: OBGYN CLINIC | Facility: MEDICAL CENTER | Age: 31
End: 2021-05-24

## 2021-05-24 VITALS — SYSTOLIC BLOOD PRESSURE: 114 MMHG | WEIGHT: 152 LBS | DIASTOLIC BLOOD PRESSURE: 72 MMHG | BODY MASS INDEX: 24.53 KG/M2

## 2021-05-24 DIAGNOSIS — Z34.91 FIRST TRIMESTER PREGNANCY: ICD-10-CM

## 2021-05-24 DIAGNOSIS — Z3A.12 12 WEEKS GESTATION OF PREGNANCY: Primary | ICD-10-CM

## 2021-05-24 DIAGNOSIS — Z98.891 STATUS POST PRIMARY LOW TRANSVERSE CESAREAN SECTION: ICD-10-CM

## 2021-05-24 DIAGNOSIS — Z11.3 SCREENING EXAMINATION FOR STD (SEXUALLY TRANSMITTED DISEASE): ICD-10-CM

## 2021-05-24 PROCEDURE — PNV: Performed by: STUDENT IN AN ORGANIZED HEALTH CARE EDUCATION/TRAINING PROGRAM

## 2021-05-24 NOTE — ASSESSMENT & PLAN NOTE
- We briefly reviewed the indication for her  delivery (fetal intolerance) and her approximate MFMU calculator for chance of successful vaginal birth (approx 70%)  We briefly reviewed the risks/benefits of  versus planned repeat , including the risk of uterine rupture (1 in 100-200)  She was leaning toward repeat

## 2021-05-24 NOTE — PROGRESS NOTES
Initial Prenatal Visit  [facility] [address] [place] [address]    Assessment/Plan:  Kellie Wilkinson is a 26y o  year old  at 12w5d who presents for initial prenatal visit  Supervision of normal pregnancy  - Prenatal labs reviewed and normal   Blood type: B positive  - Aneuploidy screening discussed  Patient opts for sequential aneuploidy screening   - Routine cervical cancer screening: Pap Up to date  - Routine STI Screening: GC/Chlamydia sent today  HIV/Hep B/Syphilis ordered in prenatal panel   - Patient Education: Patient was counseled regarding diet, exercise, weight gain, foods to avoid, vaccines in pregnancy, aneuploidy screening, travel precautions to include seat belt use and VTE risk reduction  She has been provided our pregnancy packet which includes how and when to contact providers, medication recommendations, dietary suggestions, breastfeeding information as well as websites for additional information, hospital and delivery concerns  Additional Pregnancy Problems:   1  12 weeks gestation of pregnancy  -     Chlamydia/GC amplified DNA by PCR    2  First trimester pregnancy  -     Chlamydia/GC amplified DNA by PCR    3  Screening examination for STD (sexually transmitted disease)  -     Chlamydia/GC amplified DNA by PCR          Subjective:   CC:  Desires prenatal care  Luis Corral is a 26 y o   female who presents for prenatal care  Pregnancy ROS: denies leakage of fluid, pelvic pain, or vaginal bleeding  Reports some nausea/vomiting  Conceived via IUI/letrozole at Dr Gladys Pond office      The following portions of the patient's history were reviewed and updated as appropriate: allergies, current medications, past family history, past medical history, obstetric history, gynecologic history, past social history, past surgical history and problem list       Objective:  /72   Wt 68 9 kg (152 lb)   LMP 2021   BMI 24 53 kg/m²   Pregravid Weight/BMI: 66 2 kg (146 lb) (BMI 23 58)  Current Weight: 68 9 kg (152 lb)   Total Weight Gain: 2 722 kg (6 lb)   Pre-Razia Vitals      Most Recent Value   Prenatal Assessment   Fetal Heart Rate  162   Prenatal Vitals   Blood Pressure  114/72   Weight - Scale  68 9 kg (152 lb)   Urine Albumin/Glucose   Dilation/Effacement/Station   Vaginal Drainage   Edema         General: Well appearing, no distress  Respiratory: Normal respiratory rate, lungs clear to auscultation, no wheezing or rales  Cardiovascular: Regular rate and rhythm, no murmurs, rubs, or gallops  Breasts: Normal bilaterally, nontender without masses, asymmetry, or nipple discharge  Abdomen: Soft, gravid, nontender  : Urethra normal  Normal labia majora and minora  Vagina normal   No vaginal bleeding  No vaginal discharge  Cervix visually closed  Extremities: Warm and well perfused  Non tender  No edema      Hannah Berg MD  [facility]  2021 11:31 AM

## 2021-05-25 ENCOUNTER — APPOINTMENT (OUTPATIENT)
Dept: LAB | Facility: CLINIC | Age: 31
End: 2021-05-25
Payer: COMMERCIAL

## 2021-05-25 ENCOUNTER — ROUTINE PRENATAL (OUTPATIENT)
Dept: PERINATAL CARE | Facility: OTHER | Age: 31
End: 2021-05-25
Payer: COMMERCIAL

## 2021-05-25 ENCOUNTER — TRANSCRIBE ORDERS (OUTPATIENT)
Dept: LAB | Facility: CLINIC | Age: 31
End: 2021-05-25

## 2021-05-25 VITALS
WEIGHT: 152.2 LBS | DIASTOLIC BLOOD PRESSURE: 86 MMHG | SYSTOLIC BLOOD PRESSURE: 131 MMHG | HEART RATE: 108 BPM | HEIGHT: 66 IN | BODY MASS INDEX: 24.46 KG/M2

## 2021-05-25 DIAGNOSIS — Z33.1 PREGNANT STATE, INCIDENTAL: Primary | ICD-10-CM

## 2021-05-25 DIAGNOSIS — Z36.82 ENCOUNTER FOR NUCHAL TRANSLUCENCY TESTING: ICD-10-CM

## 2021-05-25 DIAGNOSIS — O34.211 MATERNAL CARE DUE TO LOW TRANSVERSE UTERINE SCAR FROM PREVIOUS CESAREAN DELIVERY: Primary | ICD-10-CM

## 2021-05-25 DIAGNOSIS — Z36.9 UNSPECIFIED ANTENATAL SCREENING: ICD-10-CM

## 2021-05-25 DIAGNOSIS — Z3A.12 12 WEEKS GESTATION OF PREGNANCY: ICD-10-CM

## 2021-05-25 PROCEDURE — 76813 OB US NUCHAL MEAS 1 GEST: CPT | Performed by: OBSTETRICS & GYNECOLOGY

## 2021-05-25 PROCEDURE — 99243 OFF/OP CNSLTJ NEW/EST LOW 30: CPT | Performed by: OBSTETRICS & GYNECOLOGY

## 2021-05-25 PROCEDURE — 76801 OB US < 14 WKS SINGLE FETUS: CPT | Performed by: OBSTETRICS & GYNECOLOGY

## 2021-05-25 PROCEDURE — 36415 COLL VENOUS BLD VENIPUNCTURE: CPT

## 2021-05-25 RX ORDER — ASPIRIN 81 MG/1
162 TABLET ORAL DAILY
Qty: 180 TABLET | Refills: 3 | Status: SHIPPED | OUTPATIENT
Start: 2021-05-25 | End: 2021-11-01

## 2021-05-25 NOTE — PROGRESS NOTES
[id number] lab ordered  Instructed patient on process for checking her OOP cost via [employer] [facility]  Provided [id number] instruction card toll free # [phone]  Patient made aware if [id number]  unable to give an estimate she will need to contact [facility] office prior to blood draw  Patient aware that  is provided by third party and is only an estimate of cost not a guarantee  Insurance may require prior authorization, if test drawn without prior authorization she will be responsible for full cost of test   For definitive OOP cost, lab deductible or if lab authorization is required patient encouraged to call her insurance provider  Explained customer service insurance phone # located on the back of her ID card  [facility] will have results in approximately 7-10 business days and will call patient or notify via [address]  Patient aware viewing lab result reveals gender  **[id number] lab kit with prepaid [employer]  given to patient  Patient verbalized understanding of all instructions and no questions at this time

## 2021-05-25 NOTE — PROGRESS NOTES
The patient was seen today for an ultrasound  Please see ultrasound report (located under Ob Procedures) for additional details  Thank you very much for allowing us to participate in the care of this very nice patient  Should you have any questions, please do not hesitate to contact me  [facility] presents for a genetic screening ultrasound  This is her 3rd pregnancy  This pregnancy was conceived utilizing IUI  Her 1st pregnancy in  was a very early 4-5 week miscarriage  This was identified at 10 weeks but fetus only measured approximately 6 weeks  She had a full-term  section at 40 weeks in  complicated by low amniotic fluid at term and double nuchal cord requiring emergency  delivery  After that pregnancy, she was worked up extensively for possible gluten intolerance and had multiple antibody evaluations  In her electronic medical record, and indicates that she had elevated anticardiolipin antibodies on 1 occasion  I scoured her entire electronic medical record and could not find where she had a positive anticardiolipin antibody  I see 2 evaluations 1 from  and the other from  in which her anticardiolipin antibodies are both negative  I do not see any other labs that are indicative of antiphospholipid antibody syndrome  She has no significant substance use history  Family history is significant for father with a blood clot and her son had and has a VSD  Her sister had preeclampsia during 2 of her pregnancies  We discussed the options for genetic screening, including but not limited to first trimester screening, second trimester screening, combined first and second trimester screening, noninvasive prenatal screening (NIPS) for patients at high risk and diagnostic screening through the use of CVS and amniocentesis    We discussed the risks and benefits of each approach including the sensitivities and false positive rates as well as the difference between a screening test and a diagnostic test   At the conclusion of our discussion the patient elected noninvasive prenatal screening utilizing the MaterniT 21 plus test   The patient was given a requisition and collection kit to have this performed at the lab  The results should be available in approximately 7-10 days  We discussed that SARS-CoV-2 vaccination is an option for eligible pregnant and lactating women  We discussed that the [employer] and [employer] mRNA vaccines have not been tested in pregnant women and breastfeeding women yet but that the mRNA vaccines have no live virus and do not contain ingredients that are known to be harmful to pregnant women or to the fetus  We discussed that pregnant women are at increased risk of  more severe COVID-19 disease then the general population and that the mRNA vaccines are thought to be highly effective at preventing the disease  Animal studies of the Moderna vaccine demonstrated no significant negative effects on female fertility or fetal development  We discussed some risks of the vaccine including injection site reactions, fatigue, headache, myalgias, chills, and fever; fever can be treated with acetaminophen  ACOG and [facility] recommends that pregnant women have access to the vaccine and that the vaccine not be withheld from pregnant women  Pregnant women are encouraged to speak with their obstetric provider about their own personal choice regarding the vaccine  An [facility]-endorsed COVID-19 Vaccine in Pregnancy Decision Aid was attached to her AVS and can be located at [email or web address]     Given the patient's history of sister with preeclampsia, I recommend initiating low dose aspirin therapy    A recent meta-analysis yielded risk reductions of 24% for preeclampsia, 20% for intrauterine growth restriction, and 14% for  birth, with an absolute risk reduction of 2-5% for preeclampsia, one to 5% for intrauterine growth restriction, and 2-4% for  birth  In this study, there was no identified risk of harm to the mother or fetus but long-term evidence was somewhat limited  Given the overall safety profile and risk-benefit analysis, I recommend 162 mg of aspirin be taken Daily and discontinued at around 36 weeks gestation or 2-3 weeks prior to planned delivery  I reviewed these recommendations with the patient and answered all of her questions to apparent satisfaction  We discussed follow-up in detail and I recommend an anatomy ultrasound be scheduled for 20 weeks gestation  A fetal echocardiogram will be scheduled 22-24 weeks gestation given prior pregnancy and child with a VSD  Serial growth ultrasounds are recommended in the 2nd and 3rd trimesters and  surveillance is recommended to monitor for low amniotic fluid given prior pregnancy history  Thank you very much for allowing us to participate in the care of this very nice patient  Should you have any questions, please do not hesitate to contact our office  Please note I spent 10 minutes reviewing records prior to the visit, 25  minutes in patient contact including counseling and coordination of care, and 15 minutes in charting in the electronic medical record  Total time spent for the encounter is 50 minutes  Portions of the record may have been created with voice recognition software  Occasional wrong word or "sound a like" substitutions may have occurred due to the inherent limitations of voice recognition software  Read the chart carefully and recognize, using context, where substitutions have occurred  Frankie Whitaker MD [address] [facility]  Attending Physician, Femi

## 2021-05-25 NOTE — PATIENT INSTRUCTIONS
For a COVID-19 Vaccine in Pregnancy Decision Aid, go to https://foamcast [email or web address]/    The ABM (Academy of Breastfeeding Medicine) Statement on Considerations for COVID-19 Vaccination in Lactation is available at: TravelLesson tn  Finally, the CDC statement on Vaccination Consideration for People who are Pregnant or Breastfeeding is available at:  [place]      Here are the images (12/28/20) for the decision aid:

## 2021-05-26 LAB
C TRACH RRNA SPEC QL NAA+PROBE: NEGATIVE
N GONORRHOEA RRNA SPEC QL NAA+PROBE: NEGATIVE

## 2021-05-28 LAB — MISCELLANEOUS LAB TEST RESULT: NORMAL

## 2021-06-20 PROBLEM — Z3A.16 16 WEEKS GESTATION OF PREGNANCY: Status: ACTIVE | Noted: 2021-05-25

## 2021-06-20 NOTE — ASSESSMENT & PLAN NOTE
Echo at 20-24 weeks     Normal NIPT   AFP 16-18 weeks - drawn today at the office went over the false positive rate and sonogram will be the best to determine anything wrong     Next scan 7/20/21

## 2021-06-20 NOTE — PROGRESS NOTES
Routine Prenatal Visit  [facility] [address] [place] [address]    Assessment/Plan:  Donavon Shrestha is a 26y o  year old  at 17w2d who presents for routine prenatal visit  1  Status post primary low transverse  section  Assessment & Plan:  Discussion of  vs Repeat c section   After what she went thorough with the first pregnancy she is leaning to R c section     Future fertility - we discussed that she does not want to have any more babies, we also discussed tubal ligation at that time also     Will cont to keep open the conversation as pregnancy progresses         2  Positive cardiolipin antibodies  Assessment & Plan: On ASA       3  16 weeks gestation of pregnancy  Assessment & Plan:  Echo at 20-24 weeks     Normal NIPT   AFP 16-18 weeks - drawn today at the office went over the false positive rate and sonogram will be the best to determine anything wrong     Next scan 21      Orders:  -     Alpha fetoprotein, maternal        Subjective:     CC: Prenatal care    Eric Thomas is a 26 y o   female who presents for routine prenatal care at 16w4d  Pregnancy ROS: no leakage of fluid, pelvic pain, or vaginal bleeding  Not yet fetal movement      The following portions of the patient's history were reviewed and updated as appropriate: allergies, current medications, past family history, past medical history, obstetric history, gynecologic history, past social history, past surgical history and problem list       Objective:  /62   Wt 68 kg (150 lb)   LMP 2021   BMI 24 21 kg/m²   Pregravid Weight/BMI: 66 2 kg (146 lb) (BMI 23 58)  Current Weight: 68 kg (150 lb)   Total Weight Gain: 1 814 kg (4 lb)   Pre-Razia Vitals      Most Recent Value   Prenatal Assessment   Fetal Heart Rate  150   Prenatal Vitals   Blood Pressure  100/62   Weight - Scale  68 kg (150 lb)   Urine Albumin/Glucose   Dilation/Effacement/Station   Vaginal Drainage Edema           General: Well appearing, no distress  Respiratory: Unlabored breathing  Cardiovascular: Regular rate  Abdomen: Soft, gravid, nontender  Fundal Height: Appropriate for gestational age  Extremities: Warm and well perfused  Non tender

## 2021-06-20 NOTE — ASSESSMENT & PLAN NOTE
Discussion of  vs Repeat c section   After what she went thorough with the first pregnancy she is leaning to R c section     Future fertility - we discussed that she does not want to have any more babies, we also discussed tubal ligation at that time also     Will cont to keep open the conversation as pregnancy progresses

## 2021-06-21 ENCOUNTER — ROUTINE PRENATAL (OUTPATIENT)
Dept: OBGYN CLINIC | Facility: MEDICAL CENTER | Age: 31
End: 2021-06-21
Payer: COMMERCIAL

## 2021-06-21 VITALS — SYSTOLIC BLOOD PRESSURE: 100 MMHG | BODY MASS INDEX: 24.21 KG/M2 | WEIGHT: 150 LBS | DIASTOLIC BLOOD PRESSURE: 62 MMHG

## 2021-06-21 DIAGNOSIS — Z3A.16 16 WEEKS GESTATION OF PREGNANCY: ICD-10-CM

## 2021-06-21 DIAGNOSIS — Z98.891 STATUS POST PRIMARY LOW TRANSVERSE CESAREAN SECTION: Primary | ICD-10-CM

## 2021-06-21 DIAGNOSIS — R76.0 POSITIVE CARDIOLIPIN ANTIBODIES: ICD-10-CM

## 2021-06-21 PROCEDURE — PNV: Performed by: OBSTETRICS & GYNECOLOGY

## 2021-06-21 PROCEDURE — 36415 COLL VENOUS BLD VENIPUNCTURE: CPT | Performed by: OBSTETRICS & GYNECOLOGY

## 2021-06-21 PROCEDURE — 82105 ALPHA-FETOPROTEIN SERUM: CPT | Performed by: OBSTETRICS & GYNECOLOGY

## 2021-06-25 LAB
2ND TRIMESTER 4 SCREEN SERPL-IMP: NORMAL
AFP ADJ MOM SERPL: 0.94
AFP INTERP AMN-IMP: NORMAL
AFP INTERP SERPL-IMP: NORMAL
AFP INTERP SERPL-IMP: NORMAL
AFP SERPL-MCNC: 35.3 NG/ML
AGE AT DELIVERY: 33.3 YR
GA METHOD: NORMAL
GA: 16.7 WEEKS
IDDM PATIENT QL: NO
MULTIPLE PREGNANCY: NO
NEURAL TUBE DEFECT RISK FETUS: NORMAL %

## 2021-07-20 ENCOUNTER — ROUTINE PRENATAL (OUTPATIENT)
Dept: PERINATAL CARE | Facility: OTHER | Age: 31
End: 2021-07-20
Payer: COMMERCIAL

## 2021-07-20 VITALS
HEIGHT: 66 IN | WEIGHT: 162.6 LBS | HEART RATE: 102 BPM | SYSTOLIC BLOOD PRESSURE: 106 MMHG | DIASTOLIC BLOOD PRESSURE: 71 MMHG | BODY MASS INDEX: 26.13 KG/M2

## 2021-07-20 DIAGNOSIS — Z82.49 FAMILY HISTORY OF CONGENITAL CARDIAC SEPTAL DEFECT: ICD-10-CM

## 2021-07-20 DIAGNOSIS — Z36.86 ENCOUNTER FOR ANTENATAL SCREENING FOR CERVICAL LENGTH: ICD-10-CM

## 2021-07-20 DIAGNOSIS — Z3A.20 20 WEEKS GESTATION OF PREGNANCY: ICD-10-CM

## 2021-07-20 DIAGNOSIS — O34.211 MATERNAL CARE DUE TO LOW TRANSVERSE UTERINE SCAR FROM PREVIOUS CESAREAN DELIVERY: Primary | ICD-10-CM

## 2021-07-20 PROBLEM — Z82.79 FAMILY HISTORY OF CONGENITAL CARDIAC SEPTAL DEFECT: Status: ACTIVE | Noted: 2021-07-20

## 2021-07-20 PROCEDURE — 99213 OFFICE O/P EST LOW 20 MIN: CPT | Performed by: OBSTETRICS & GYNECOLOGY

## 2021-07-20 PROCEDURE — 76811 OB US DETAILED SNGL FETUS: CPT | Performed by: OBSTETRICS & GYNECOLOGY

## 2021-07-20 PROCEDURE — 76817 TRANSVAGINAL US OBSTETRIC: CPT | Performed by: OBSTETRICS & GYNECOLOGY

## 2021-07-20 NOTE — LETTER
2021     YONI Garcia  [address]  [address] [facility] [place]    Patient: Isidra Mike   YOB: 1990   Date of Visit: 2021       Dear Dr Mike Moreno: Thank you for referring Lia Otero to me for evaluation  Below are my notes for this consultation  If you have questions, please do not hesitate to call me  I look forward to following your patient along with you  Sincerely,        Krystina Quinn MD        CC: No Recipients  Krystina Quinn MD  2021  9:07 AM  Sign when Signing Visit  Isidra Mike  has no complaints today at 20w6d  She reports fetal movements and does not report any vaginal bleeding or signs of labor  Her recently completed fetal testing revealed a  Normal NIPT normal MSAFP  She is here today for anatomy scan and review of the cervical length  Risk factors include her history of a prior  section and  Her son was diagnosed after birth with a VSD described as tiny pinhole is in the muscular part of the ventricular septum  He follows with Dr Nasima Fletcher and has a follow-up Pediatric echo at age 1 to see if this closes  She is planning on a repeat   She received counseling about the COVID vaccine on her last ultrasound  She declines receiving the COVID vaccine in pregnancy  Ultrasound findings: The ultrasound today shows normal interval fetal growth and fluid, normal cervical length, and no malformations were detected  Today her anatomical survey could not be completed secondary to fetal position  While both hands appear normal we did not see an open hands  We also had limited views of the nose lips, three-vessel trachea, IVC and SVC due to fetal position  Her placenta is not near her prior  scar  Pregnancy ultrasound has limitations and is unable to detect all forms of fetal congenital abnormalities  Follow up recommended:   1  She has a follow-up fetal echo scheduled on       Pre visit time reviewing her records   5 minutes  Face to face time 5 minutes  Post visit time on documentation of note, updating her problem list, adding orders and prescriptions 5 minutes  Procedures that were completed today were charged separately  The level of decision making was straight forward      Martha Cervantes MD

## 2021-07-20 NOTE — PROGRESS NOTES
Ultrasound Probe Disinfection    A transvaginal ultrasound was performed  Prior to use, disinfection was performed with High Level Disinfection Process (Trophon)  Probe serial number F3: [id number] was used        Alysha Pena  07/20/21  7:55 AM

## 2021-07-20 NOTE — PROGRESS NOTES
Galvin Apgar  has no complaints today at 20w6d  She reports fetal movements and does not report any vaginal bleeding or signs of labor  Her recently completed fetal testing revealed a  Normal NIPT normal MSAFP  She is here today for anatomy scan and review of the cervical length  Risk factors include her history of a prior  section and  Her son was diagnosed after birth with a VSD described as tiny pinhole is in the muscular part of the ventricular septum  He follows with Dr Tena Cordero and has a follow-up Pediatric echo at age 1 to see if this closes  She is planning on a repeat   She received counseling about the COVID vaccine on her last ultrasound  She declines receiving the COVID vaccine in pregnancy  Ultrasound findings: The ultrasound today shows normal interval fetal growth and fluid, normal cervical length, and no malformations were detected  Today her anatomical survey could not be completed secondary to fetal position  While both hands appear normal we did not see an open hands  We also had limited views of the nose lips, three-vessel trachea, IVC and SVC due to fetal position  Her placenta is not near her prior  scar  Pregnancy ultrasound has limitations and is unable to detect all forms of fetal congenital abnormalities  Follow up recommended:   1  She has a follow-up fetal echo scheduled on   Pre visit time reviewing her records   5 minutes  Face to face time 5 minutes  Post visit time on documentation of note, updating her problem list, adding orders and prescriptions 5 minutes  Procedures that were completed today were charged separately  The level of decision making was straight forward      Declan Dueñas MD

## 2021-07-21 ENCOUNTER — ROUTINE PRENATAL (OUTPATIENT)
Dept: OBGYN CLINIC | Facility: MEDICAL CENTER | Age: 31
End: 2021-07-21

## 2021-07-21 VITALS — WEIGHT: 161 LBS | BODY MASS INDEX: 25.99 KG/M2 | SYSTOLIC BLOOD PRESSURE: 120 MMHG | DIASTOLIC BLOOD PRESSURE: 60 MMHG

## 2021-07-21 DIAGNOSIS — Z98.891 STATUS POST PRIMARY LOW TRANSVERSE CESAREAN SECTION: ICD-10-CM

## 2021-07-21 DIAGNOSIS — Z82.49 FAMILY HISTORY OF CONGENITAL CARDIAC SEPTAL DEFECT: ICD-10-CM

## 2021-07-21 DIAGNOSIS — Z3A.21 21 WEEKS GESTATION OF PREGNANCY: Primary | ICD-10-CM

## 2021-07-21 DIAGNOSIS — Z34.92 SECOND TRIMESTER PREGNANCY: ICD-10-CM

## 2021-07-21 PROCEDURE — PNV: Performed by: OBSTETRICS & GYNECOLOGY

## 2021-07-21 NOTE — PROGRESS NOTES
Assessment  26 y o   at 21w0d presenting for routine prenatal visit  Plan  Diagnoses and all orders for this visit:    21 weeks gestation of pregnancy  Second trimester pregnancy  - Second trimester precautions  - Level 2 reviewed  - Return in 4wks for PN    Family history of congenital cardiac septal defect  - Fetal echo      Status post primary low transverse  section  - Prefers RLTCS; discussed 3rd trimester scheduling      ____________________________________________________________        Subjective    Delmis Dam is a 26 y o   at 21w0d who presents for routine prenatal visit  She is without complaint  She denies contractions, loss of fluid, or vaginal bleeding  She feels fluttering fetal movements  Pregnancy Problems:  Patient Active Problem List   Diagnosis    Status post primary low transverse  section    21 weeks gestation of pregnancy    Family history of congenital cardiac septal defect         Objective  /60   Wt 73 kg (161 lb)   LMP 2021   BMI 25 99 kg/m²     FHT: 145 BPM   Uterine Size: size equals dates     Physical Exam:  Physical Exam  Constitutional:       General: She is not in acute distress  Appearance: Normal appearance  She is well-developed  She is not ill-appearing, toxic-appearing or diaphoretic  HENT:      Head: Normocephalic and atraumatic  Eyes:      General: No scleral icterus  Right eye: No discharge  Left eye: No discharge  Conjunctiva/sclera: Conjunctivae normal    Pulmonary:      Effort: Pulmonary effort is normal  No accessory muscle usage or respiratory distress  Abdominal:      General: There is distension (gravid)  Tenderness: There is no abdominal tenderness  There is no guarding or rebound  Skin:     General: Skin is warm and dry  Coloration: Skin is not jaundiced  Findings: No bruising, erythema or rash  Neurological:      Mental Status: She is alert  Psychiatric:         Mood and Affect: Mood normal          Behavior: Behavior normal          Thought Content:  Thought content normal          Judgment: Judgment normal

## 2021-08-08 PROBLEM — O35.2XX0 PREVIOUS CHILD BORN WITH VENTRICULAR SEPTAL DEFECT, CURRENTLY PREGNANT, ANTEPARTUM: Status: ACTIVE | Noted: 2021-08-08

## 2021-08-08 PROBLEM — O35.2XX0 HEREDITARY DISEASE IN FAMILY POSSIBLY AFFECTING FETUS: Status: ACTIVE | Noted: 2021-08-08

## 2021-08-08 NOTE — PROGRESS NOTES
Please refer to the [facility] ultrasound report in Ob Procedures for additional information regarding today's visit

## 2021-08-09 ENCOUNTER — ROUTINE PRENATAL (OUTPATIENT)
Dept: PERINATAL CARE | Facility: OTHER | Age: 31
End: 2021-08-09
Payer: COMMERCIAL

## 2021-08-09 VITALS
WEIGHT: 164.4 LBS | HEART RATE: 84 BPM | SYSTOLIC BLOOD PRESSURE: 109 MMHG | DIASTOLIC BLOOD PRESSURE: 76 MMHG | BODY MASS INDEX: 26.42 KG/M2 | HEIGHT: 66 IN

## 2021-08-09 DIAGNOSIS — O35.2XX0 PREVIOUS CHILD BORN WITH VENTRICULAR SEPTAL DEFECT, CURRENTLY PREGNANT, SINGLE OR UNSPECIFIED FETUS: ICD-10-CM

## 2021-08-09 DIAGNOSIS — O35.2XX0 HEREDITARY DISEASE IN FAMILY POSSIBLY AFFECTING FETUS, AFFECTING MANAGEMENT OF MOTHER IN PREGNANCY, SINGLE OR UNSPECIFIED FETUS: ICD-10-CM

## 2021-08-09 DIAGNOSIS — Z3A.23 23 WEEKS GESTATION OF PREGNANCY: Primary | ICD-10-CM

## 2021-08-09 PROCEDURE — 76827 ECHO EXAM OF FETAL HEART: CPT | Performed by: OBSTETRICS & GYNECOLOGY

## 2021-08-09 PROCEDURE — 93325 DOPPLER ECHO COLOR FLOW MAPG: CPT | Performed by: OBSTETRICS & GYNECOLOGY

## 2021-08-09 PROCEDURE — 76825 ECHO EXAM OF FETAL HEART: CPT | Performed by: OBSTETRICS & GYNECOLOGY

## 2021-08-09 NOTE — LETTER
August 9, 2021     YONI Nelson  [address]  [address] [place]    Patient: Elvia Anderson   YOB: 1990   Date of Visit: 8/9/2021       Dear Dr Daron Phelps: Thank you for referring Derek Avendano to me for evaluation  Below are my notes for this consultation  If you have questions, please do not hesitate to call me  I look forward to following your patient along with you  Sincerely,        Janae Summers MD        CC: No Recipients  Janae Summers MD  8/8/2021 10:07 AM  Sign when Signing Visit  Please refer to the [facility] ultrasound report in Ob Procedures for additional information regarding today's visit

## 2021-08-18 NOTE — PATIENT INSTRUCTIONS
Pregnancy at 32 to 30 Weeks   AMBULATORY CARE:   What changes are happening to your body: You may notice new symptoms such as shortness of breath, heartburn, or swelling of your ankles and feet  You may also have trouble sleeping or contractions  Seek care immediately if:   · You develop a severe headache that does not go away  · You have new or increased vision changes, such as blurred or spotted vision  · You have new or increased swelling in your face or hands  · You have vaginal spotting or bleeding  · Your water broke or you feel warm water gushing or trickling from your vagina  Contact your healthcare provider if:   · You have more than 5 contractions in 1 hour  · You notice any changes in your baby's movements  · You have abdominal cramps, pressure, or tightening  · You have a change in vaginal discharge  · You have chills or a fever  · You have vaginal itching, burning, or pain  · You have yellow, green, white, or foul-smelling vaginal discharge  · You have pain or burning when you urinate, less urine than usual, or pink or bloody urine  · You have questions or concerns about your condition or care  How to care for yourself at this stage of your pregnancy:   · Eat a variety of healthy foods  Healthy foods include fruits, vegetables, whole-grain breads, low-fat dairy foods, beans, lean meats, and fish  Drink liquids as directed  Ask how much liquid to drink each day and which liquids are best for you  Limit caffeine to less than 200 milligrams each day  Limit your intake of fish to 2 servings each week  Choose fish low in mercury such as canned light tuna, shrimp, salmon, cod, or tilapia  Do not  eat fish high in mercury such as swordfish, tilefish, manny mackerel, and shark  · Manage heartburn  by eating 4 or 5 small meals each day instead of large meals  Avoid spicy food  · Manage swelling  by lying down and putting your feet up           · Take prenatal vitamins as directed  Your need for certain vitamins and minerals, such as folic acid, increases during pregnancy  Prenatal vitamins provide some of the extra vitamins and minerals you need  Prenatal vitamins may also help to decrease the risk of certain birth defects  · Talk to your healthcare provider about exercise  Moderate exercise can help you stay fit  Your healthcare provider will help you plan an exercise program that is safe for you during pregnancy  · Do not smoke  Smoking increases your risk of a miscarriage and other health problems during your pregnancy  Smoking can cause your baby to be born too early or weigh less at birth  Ask your healthcare provider for information if you need help quitting  · Do not drink alcohol  Alcohol passes from your body to your baby through the placenta  It can affect your baby's brain development and cause fetal alcohol syndrome (FAS)  FAS is a group of conditions that causes mental, behavior, and growth problems  · Talk to your healthcare provider before you take any medicines  Many medicines may harm your baby if you take them when you are pregnant  Do not take any medicines, vitamins, herbs, or supplements without first talking to your healthcare provider  Never use illegal or street drugs (such as marijuana or cocaine) while you are pregnant  Safety tips during pregnancy:   · Avoid hot tubs and saunas  Do not use a hot tub or sauna while you are pregnant, especially during your first trimester  Hot tubs and saunas may raise your baby's temperature and increase the risk of birth defects  · Avoid toxoplasmosis  This is an infection caused by eating raw meat or being around infected cat feces  It can cause birth defects, miscarriages, and other problems  Wash your hands after you touch raw meat  Make sure any meat is well-cooked before you eat it  Avoid raw eggs and unpasteurized milk   Use gloves or ask someone else to clean your cat's litter box while you are pregnant  Changes that are happening with your baby:  By 30 weeks, your baby may weigh more than 3 pounds  Your baby may be about 11 inches long from the top of the head to the rump (baby's bottom)  Your baby's eyes open and close now  Your baby's kicks and movements are more forceful at this time  What you need to know about prenatal care: Your healthcare provider will check your blood pressure and weight  You may also need the following:  · Blood tests  may be done to check for anemia or blood type  · A urine test  may also be done to check for sugar and protein  These can be signs of gestational diabetes or infection  Protein in your urine may also be a sign of preeclampsia  Preeclampsia is a condition that can develop during week 20 or later of your pregnancy  It causes high blood pressure, and it can cause problems with your kidneys and other organs  · A Tdap vaccine and flu vaccine  may be recommended by your healthcare provider  · A gestational diabetes screen  will be done using an oral glucose tolerance test (OGTT)  An OGTT starts with a blood sugar level check after you have not eaten for 8 hours  You are then given a glucose drink  Your blood sugar level is checked after 1 hour, 2 hours, and sometimes 3 hours  Healthcare providers look at how much your blood sugar level increases from the first check  · Fundal height  is a measurement of your uterus to check your baby's growth  This number is usually the same as the number of weeks that you have been pregnant  Your healthcare provider may also check your baby's position  · Your baby's heart rate  will be checked  © Copyright [employer] 2021 Information is for End User's use only and may not be sold, redistributed or otherwise used for commercial purposes   All illustrations and images included in CareNotes® are the copyrighted property of [employer]  or Kathie [employer]  The above information is an  only  It is not intended as medical advice for individual conditions or treatments  Talk to your doctor, nurse or pharmacist before following any medical regimen to see if it is safe and effective for you

## 2021-08-18 NOTE — PROGRESS NOTES
Routine Prenatal Visit  [facility]      Assessment/Plan:  Nargis Aleman is a 33y o  year old  at 25w1d who presents for routine prenatal visit  1  25 weeks gestation of pregnancy    2  Status post primary low transverse  section    3  Family history of congenital cardiac septal defect    - reviewed 2nd trimester precautions  - for 1 hr gtt/cbc/TDAP  - taking PNV daily  - fetal echo - completed, follow-up with MFM at 36 wks  - reviewed covid vaccine recommendations- plans to wait until after delivery  Recommend wearing masks in public locations and continue with good hand washing and social distancing    - next visit 4 weeks       Subjective:     CC: Prenatal care    Kirsten Nixon is a 33 y o   female who presents for routine prenatal care at 25w1d  Pregnancy ROS: no leakage of fluid, pelvic pain, or vaginal bleeding  Reports fetal movement  The following portions of the patient's history were reviewed and updated as appropriate: allergies, current medications, past family history, past medical history, obstetric history, gynecologic history, past social history, past surgical history and problem list       Objective:  /68   Wt 75 1 kg (165 lb 8 oz)   LMP 2021   BMI 26 71 kg/m²   Pregravid Weight/BMI: 66 2 kg (146 lb) (BMI 23 58)  Current Weight: 75 1 kg (165 lb 8 oz)   Total Weight Gain: 8 845 kg (19 lb 8 oz)   Pre-Razia Vitals      Most Recent Value   Prenatal Assessment   Fetal Heart Rate  144   Fundal Height (cm)  25 cm   Movement  Present   Prenatal Vitals   Blood Pressure  112/68   Weight - Scale  75 1 kg (165 lb 8 oz)   Urine Albumin/Glucose   Dilation/Effacement/Station   Vaginal Drainage   Edema   LLE Edema  None   RLE Edema  None           General: Well appearing, no distress  Respiratory: Unlabored breathing  Cardiovascular: Regular rate  Abdomen: Soft, gravid, nontender  Fundal Height: Appropriate for gestational age    Extremities: Warm and well perfused  Non tender

## 2021-08-19 ENCOUNTER — ROUTINE PRENATAL (OUTPATIENT)
Dept: OBGYN CLINIC | Facility: MEDICAL CENTER | Age: 31
End: 2021-08-19

## 2021-08-19 VITALS — SYSTOLIC BLOOD PRESSURE: 112 MMHG | DIASTOLIC BLOOD PRESSURE: 68 MMHG | WEIGHT: 165.5 LBS | BODY MASS INDEX: 26.71 KG/M2

## 2021-08-19 DIAGNOSIS — Z82.49 FAMILY HISTORY OF CONGENITAL CARDIAC SEPTAL DEFECT: ICD-10-CM

## 2021-08-19 DIAGNOSIS — Z3A.25 25 WEEKS GESTATION OF PREGNANCY: Primary | ICD-10-CM

## 2021-08-19 DIAGNOSIS — Z98.891 STATUS POST PRIMARY LOW TRANSVERSE CESAREAN SECTION: ICD-10-CM

## 2021-08-19 PROCEDURE — PNV: Performed by: ADVANCED PRACTICE MIDWIFE

## 2021-09-09 NOTE — PROGRESS NOTES
Assessment and Plan:   Patient is a 73-year-old female who presents for rheumatology follow-up for history of positive and prior rash with questionable interface changes on skin biopsy  I have followed along with her for any concerning to suggest autoimmune disease and she has not developed any obvious signs or symptoms  At 1 point along the way, she cut gluten out of her diet and this rash resolved and has not recurred with a gluten free diet  At 1 point she did reintroduce gluten and the rash did recur, so she has been off gluten completely since then  We reviewed it seems she has a gluten allergy/intolerance and I suggesting off the gluten  She was okay with this  She otherwise has not had any new concerns and we did a fresh workup last year before she became pregnant which was grossly negative for autoimmune HERNÁN  Given this we discussed she does not need any additional planned rheumatology follow-up but can return if there are new changes or concerns  Plan:  Diagnoses and all orders for this visit:    Interface dermatitis    HERNÁN positive    Rash of face    Pregnancy, unspecified gestational age    Gluten intolerance        Follow-up plan: no rheumatology follow-up needed       Rheumatic Disease Summary  1  +HERNÁN, interface changes on skin biopsy   -Rheum eval 2/25/19 for +HERNÁN, +cardiolipin, +beta-2-glycoprotein, and rash with some interface changes on skin biopsy   No other clinical features or symptoms of autoimmune disease   -Rash onset 2/2018 on elbows and knees, painful/itchy, then on toes in 6/2018; some improvement with gluten-free but then recurred 1/2019  -Skin biopsy from knee 1/25/19 was nondiagnostic with nonspecific changes but included spongiosis, acanthosis, and interface changes, no neutrophils seen, possible viral exanthema vs CTD vs resolving dermatitis herpetiformis  -Rash resolved with course of prednisone and then started recurring off pred  -Developed small papules on her fingers 2/2019, different rash  -Labs 2/2019: +HERNÁN 80 speckled, +cardiolipin IgM 28, +beta-2-glycoprotein IgM 33; negative HERNÁN panel, LAC, celiac panel, ANCA, Hep B/C, Lyme, myositis panel, RF, CCP, HLA-B27, SPEP; low TSH 0 276 but normal on repeat 0 503; normal C3/4, ACE 54, ESR 5, CRP<3, IGs, CPK 32, aldolase 9, UA, CMP, CBC  -Visit 9/11/20: no concerning signs/sx of AI disease, rash still resolved with gluten-free diet, having difficulty conceiving, recheck some labs given previous interface dermatitis on biopsy and prior abnormal labs   -Labs 9/15/20: neg HERNÁN, dsDNA, SSA, SSB, APLs, UA, normal C3/4  -Visit 9/13/21: 28 weeks pregnant, no new changes or concerns, discussed remaining off gluten due to allergy/sensitivity   2  +Cardiolipin IgM, +Beta-2-glycoprotein  -Low titers of 28 and 33 x1, need repeating mid-May 2019  -Repeat labs 5/9/19: negative cardiolipin and beta-2-glycoprotein, negative LAC  -No qualifying event of thrombosis or pregnancy complications to diagnose APS  -1 miscarriage at 10 weeks, 1 normal healthy pregnancy   -Hematology eval 6/2019: repeat APL testing is negative, recd low dose aspirin QOD, consider GI eval for celiac     HPI  Galen Hood is a 33 y o   female who presents for rheumatology follow-up regarding previously positive HERNÁN, cardiolipin and beta 2 glycoprotein antibodies along with a rash that showed some possible interface changes on biopsy  After last visit we repeated some routine rheumatologic labs to assess for any changes in everything came back normal and reassuring  Patient reports that she is doing well  No major complaints today  She is currently 28 weeks pregnant with a girl  Reports for the 1st 20 weeks of pregnant she she was very sick but is doing better now  She has remained completely off gluten and has not had any issues with recurrent rashes  She denies any other new skin issues, sicca symptoms, joint pain or swelling  No photosensitivity noted  Aside from the severe morning stiffness her pregnancy has gone well so far otherwise  We reviewed her prior labs which showed at 1 point she had a positive cardiolipin and then on repeat this was negative  She also saw Hematology who reassured her as well with the negative results  The following portions of the patient's history were reviewed and updated as appropriate: allergies, current medications, past family history, past medical history, past social history, past surgical history and problem list     Review of Systems:   Review of Systems   Constitutional: Negative for fatigue and unexpected weight change  HENT: Negative for mouth sores  Respiratory: Negative for cough and shortness of breath  Gastrointestinal: Negative for constipation and diarrhea  Musculoskeletal: Negative for arthralgias, back pain, joint swelling and myalgias  Skin: Negative for color change and rash  Neurological: Negative for weakness  Psychiatric/Behavioral: Negative for sleep disturbance  Home Medications:    Current Outpatient Medications:     aspirin (ECOTRIN LOW STRENGTH) 81 mg EC tablet, Take 2 tablets (162 mg total) by mouth daily, Disp: 180 tablet, Rfl: 3    Cholecalciferol (Vitamin D) 50 MCG (2000 UT) tablet, , Disp: , Rfl:     Docosahexaenoic Acid (PreNatal DHA) 200 MG CAPS, , Disp: , Rfl:     Objective:    Vitals:    09/13/21 1131   Pulse: 80   Weight: 75 1 kg (165 lb 9 1 oz)   Height: 5' 6" (1 676 m)       Physical Exam  Constitutional:       General: She is not in acute distress  Appearance: She is well-developed  HENT:      Head: Normocephalic and atraumatic  Eyes:      General: Lids are normal  No scleral icterus  Conjunctiva/sclera: Conjunctivae normal    Pulmonary:      Effort: Pulmonary effort is normal  No tachypnea, accessory muscle usage or respiratory distress  Musculoskeletal:      Cervical back: Neck supple  Skin:     General: Skin is dry  Findings: No rash  Neurological:      Mental Status: She is alert  Psychiatric:         Behavior: Behavior normal  Behavior is cooperative           Labs:   Component      Latest Ref Rng & Units 9/15/2020   Glucose, Random      65 - 99 mg/dL 95   BUN      6 - 20 mg/dL 10   Creatinine      0 57 - 1 00 mg/dL 1 03 (H)   eGFR Non       >59 mL/min/1 73 72   eGFR       >59 mL/min/1 73 83   SL AMB BUN/CREATININE RATIO      9 - 23 10   Sodium      134 - 144 mmol/L 142   Potassium      3 5 - 5 2 mmol/L 4 2   Chloride      96 - 106 mmol/L 106   CO2      20 - 29 mmol/L 25   CALCIUM      8 7 - 10 2 mg/dL 9 4   Total Protein      6 0 - 8 5 g/dL 7 4   Albumin      3 8 - 4 8 g/dL 4 8   Globulin, Total      1 5 - 4 5 g/dL 2 6   Albumin/Globulin Ratio      1 2 - 2 2 1 8   TOTAL BILIRUBIN      0 0 - 1 2 mg/dL 0 4   ALKALINE PHOSPHATASE ISOENZYMES      39 - 117 IU/L 47   AST      0 - 40 IU/L 17   ALT      0 - 32 IU/L 17   Protime      sec 10 7   INR      ratio 1 0   aPTT      sec 23 8   aPTT 1:1 Normal Plasma      sec CANCELED   aPTT 1:1 Mix Saline      sec CANCELED   THROMBIN TIME      sec 17 6   DRVVT Screen Seconds      sec 26 9   DRVVT Confirm Seconds      sec CANCELED   DRVVT Ratio      ratio CANCELED   Hex Phosph Neut Test      sec 0   Platelet Neutralization Test      sec 0 0   ANTICARDIOLIPIN IGG ANTIBODY      GPL <10   ANTICARDIOLIPIN IGM ANTIBODY      MPL <10   BETA-2 GLYCOPROTEIN 1 IGG ANTIBODY      SGU <10   BETA-2 GLYCOPROTEIN 1 IGM ANTIBODY      SMU <10   BETA-2 GLYCOPROTEIN 1 IGA ANTIBODY      HEIDI <10   LUPUS REFLEX INTERPRETATION       Negative LAC   SL AMB SPECIFIC GRAVITY-URINE      1 005 - 1 030 1 005   pH      5 0 - 7 5 7 5   Color, UA      Yellow Yellow   Urine Appearance      Clear Clear   Leukocytes, UA      Negative Trace (A)   Protein      Negative/Trace Negative   Glucose, 24 HR Urine      Negative Negative   Ketones, UA      Negative Negative   Blood, UA      Negative Negative   Bilirubin, Urine      Negative Negative   SL AMB POCT UROBILINOGEN      0 2 - 1 0 mg/dL 0 2   Nitrite, UA      Negative Negative   MICROSCOPIC EXAMINATION       See below:   WBC, UA      0 - 5 /hpf 0-5   RBC, UA      0 - 2 /hpf 0-2   EPITHELEAL UA      0 - 10 /hpf 0-10   MUCUS THREADS      Not Estab   Present   Bacteria, UA      None seen/Few Many (A)   SSA (RO) ANTIBODY      0 0 - 0 9 AI <0 2   SSB (LA) ANTIBODY      0 0 - 0 9 AI <0 2   C3 Complement      82 0 - 167 0 mg/dL 106   C4, COMPLEMENT      14 0 - 44 0 mg/dL 17   Antinuclear Antibodies, IFA       Negative   Please Note       Comment   SMOOTH MUSCLE ANTIBODY      0 - 19 Units 7   ANTI DNA DOUBLE STRANDED      0 - 9 IU/mL <1   ANTI-NUCLEAR ANTIBODY (HERNÁN)      Negative Negative     Component      Latest Ref Rng & Units 9/15/2020   ANTICARDIOLIPIN IGG ANTIBODY      0 - 14 GPL U/mL <9   ANTICARDIOLIPIN IGM ANTIBODY      0 - 12 MPL U/mL 10   ANTICARDIOLIPIN IGA ANTIBODY      0 - 11 APL U/mL <9   BETA-2 GLYCOPROTEIN 1 IGG ANTIBODY      0 - 20 GPI IgG units <9   BETA-2 GLYCOPROTEIN 1 IGA ANTIBODY      0 - 25 GPI IgA units <9   BETA-2 GLYCOPROTEIN 1 IGM ANTIBODY      0 - 32 GPI IgM units <9

## 2021-09-13 ENCOUNTER — OFFICE VISIT (OUTPATIENT)
Dept: RHEUMATOLOGY | Facility: CLINIC | Age: 31
End: 2021-09-13
Payer: COMMERCIAL

## 2021-09-13 VITALS — WEIGHT: 165.57 LBS | HEART RATE: 80 BPM | BODY MASS INDEX: 26.61 KG/M2 | HEIGHT: 66 IN

## 2021-09-13 DIAGNOSIS — R76.8 ANA POSITIVE: ICD-10-CM

## 2021-09-13 DIAGNOSIS — Z34.90 PREGNANCY, UNSPECIFIED GESTATIONAL AGE: ICD-10-CM

## 2021-09-13 DIAGNOSIS — L30.8 INTERFACE DERMATITIS: Primary | ICD-10-CM

## 2021-09-13 DIAGNOSIS — K90.41 GLUTEN INTOLERANCE: ICD-10-CM

## 2021-09-13 DIAGNOSIS — R21 RASH OF FACE: ICD-10-CM

## 2021-09-13 PROCEDURE — 99214 OFFICE O/P EST MOD 30 MIN: CPT | Performed by: INTERNAL MEDICINE

## 2021-09-14 ENCOUNTER — ROUTINE PRENATAL (OUTPATIENT)
Dept: OBGYN CLINIC | Facility: MEDICAL CENTER | Age: 31
End: 2021-09-14
Payer: COMMERCIAL

## 2021-09-14 VITALS — WEIGHT: 164 LBS | DIASTOLIC BLOOD PRESSURE: 70 MMHG | SYSTOLIC BLOOD PRESSURE: 100 MMHG | BODY MASS INDEX: 26.47 KG/M2

## 2021-09-14 DIAGNOSIS — Z3A.28 28 WEEKS GESTATION OF PREGNANCY: Primary | ICD-10-CM

## 2021-09-14 DIAGNOSIS — O35.2XX0 PREVIOUS CHILD BORN WITH VENTRICULAR SEPTAL DEFECT, CURRENTLY PREGNANT, SINGLE OR UNSPECIFIED FETUS: ICD-10-CM

## 2021-09-14 DIAGNOSIS — Z98.891 STATUS POST PRIMARY LOW TRANSVERSE CESAREAN SECTION: ICD-10-CM

## 2021-09-14 DIAGNOSIS — K90.41 GLUTEN INTOLERANCE: ICD-10-CM

## 2021-09-14 LAB
BASOPHILS # BLD AUTO: 0.05 THOUSANDS/ΜL (ref 0–0.1)
BASOPHILS NFR BLD AUTO: 0 % (ref 0–1)
EOSINOPHIL # BLD AUTO: 0.03 THOUSAND/ΜL (ref 0–0.61)
EOSINOPHIL NFR BLD AUTO: 0 % (ref 0–6)
ERYTHROCYTE [DISTWIDTH] IN BLOOD BY AUTOMATED COUNT: 12.5 % (ref 11.6–15.1)
GLUCOSE 1H P 50 G GLC PO SERPL-MCNC: 130 MG/DL (ref 40–134)
HCT VFR BLD AUTO: 40.3 % (ref 34.8–46.1)
HGB BLD-MCNC: 13.4 G/DL (ref 11.5–15.4)
IMM GRANULOCYTES # BLD AUTO: 0.12 THOUSAND/UL (ref 0–0.2)
IMM GRANULOCYTES NFR BLD AUTO: 1 % (ref 0–2)
LYMPHOCYTES # BLD AUTO: 2.06 THOUSANDS/ΜL (ref 0.6–4.47)
LYMPHOCYTES NFR BLD AUTO: 18 % (ref 14–44)
MCH RBC QN AUTO: 32.3 PG (ref 26.8–34.3)
MCHC RBC AUTO-ENTMCNC: 33.3 G/DL (ref 31.4–37.4)
MCV RBC AUTO: 97 FL (ref 82–98)
MONOCYTES # BLD AUTO: 0.65 THOUSAND/ΜL (ref 0.17–1.22)
MONOCYTES NFR BLD AUTO: 6 % (ref 4–12)
NEUTROPHILS # BLD AUTO: 8.29 THOUSANDS/ΜL (ref 1.85–7.62)
NEUTS SEG NFR BLD AUTO: 75 % (ref 43–75)
NRBC BLD AUTO-RTO: 0 /100 WBCS
PLATELET # BLD AUTO: 207 THOUSANDS/UL (ref 149–390)
PMV BLD AUTO: 12.8 FL (ref 8.9–12.7)
RBC # BLD AUTO: 4.15 MILLION/UL (ref 3.81–5.12)
WBC # BLD AUTO: 11.2 THOUSAND/UL (ref 4.31–10.16)

## 2021-09-14 PROCEDURE — 82950 GLUCOSE TEST: CPT | Performed by: OBSTETRICS & GYNECOLOGY

## 2021-09-14 PROCEDURE — 85025 COMPLETE CBC W/AUTO DIFF WBC: CPT | Performed by: OBSTETRICS & GYNECOLOGY

## 2021-09-14 PROCEDURE — 90471 IMMUNIZATION ADMIN: CPT | Performed by: OBSTETRICS & GYNECOLOGY

## 2021-09-14 PROCEDURE — 36415 COLL VENOUS BLD VENIPUNCTURE: CPT | Performed by: OBSTETRICS & GYNECOLOGY

## 2021-09-14 PROCEDURE — PNV: Performed by: OBSTETRICS & GYNECOLOGY

## 2021-09-14 PROCEDURE — 90715 TDAP VACCINE 7 YRS/> IM: CPT | Performed by: OBSTETRICS & GYNECOLOGY

## 2021-09-14 NOTE — PROGRESS NOTES
Routine Prenatal Visit  [facility] [address] [place] [address]    Assessment/Plan:  Ana Booth is a 33y o  year old  at 30w11d who presents for routine prenatal visit  1  28 weeks gestation of pregnancy  Assessment & Plan:  - Birth plan given, peds list given, birth consent signed  - 28 wk labs drawn  - Tdap to be given, recommendations reviewed for family vaccination  - Rhogam not indicated      NIPT / AFP - normal     Follow up growth on 21      Orders:  -     Glucose, 1H PG  -     CBC and differential  -     Tdap Vaccine greater than or equal to 8yo    2  Previous child born with ventricular septal defect, currently pregnant, single or unspecified fetus  Assessment & Plan:  21- ECHO WNL       3  Status post primary low transverse  section  Assessment & Plan: For repeat c section   Considering -or       Plans for future children re- addressed  - she wants her  to have a vasectomy           4  Gluten intolerance  Assessment & Plan:  Seen by rheumatology   Recommend to see GI will have apt made and most likely needs to be seen post partum             Subjective:     CC: Prenatal care    Filemon Sanchez is a 33 y o   female who presents for routine prenatal care at 28w6d  Pregnancy ROS: no leakage of fluid, pelvic pain, or vaginal bleeding  yes fetal movement      The following portions of the patient's history were reviewed and updated as appropriate: allergies, current medications, past family history, past medical history, obstetric history, gynecologic history, past social history, past surgical history and problem list       Objective:  /70   Wt 74 4 kg (164 lb)   LMP 2021   BMI 26 47 kg/m²   Pregravid Weight/BMI: 66 2 kg (146 lb) (BMI 23 58)  Current Weight: 74 4 kg (164 lb)   Total Weight Gain: 8 165 kg (18 lb)   Pre-Razia Vitals      Most Recent Value   Prenatal Assessment   Fetal Heart Rate  146 Movement  Present   Prenatal Vitals   Blood Pressure  100/70   Weight - Scale  74 4 kg (164 lb)   Urine Albumin/Glucose   Dilation/Effacement/Station   Vaginal Drainage   Edema   LLE Edema  None   RLE Edema  None           General: Well appearing, no distress  Respiratory: Unlabored breathing  Cardiovascular: Regular rate  Abdomen: Soft, gravid, nontender  Fundal Height: Appropriate for gestational age  Extremities: Warm and well perfused  Non tender

## 2021-09-14 NOTE — ASSESSMENT & PLAN NOTE
Seen by rheumatology   Recommend to see GI will have apt made and most likely needs to be seen post partum

## 2021-09-14 NOTE — ASSESSMENT & PLAN NOTE
- Birth plan given, peds list given, birth consent signed  - 28 wk labs drawn  - Tdap to be given, recommendations reviewed for family vaccination  - Rhogam not indicated      NIPT / AFP - normal     Follow up growth on 11/1/21

## 2021-09-28 ENCOUNTER — ROUTINE PRENATAL (OUTPATIENT)
Dept: OBGYN CLINIC | Facility: MEDICAL CENTER | Age: 31
End: 2021-09-28

## 2021-09-28 VITALS — DIASTOLIC BLOOD PRESSURE: 70 MMHG | SYSTOLIC BLOOD PRESSURE: 102 MMHG | BODY MASS INDEX: 27.92 KG/M2 | WEIGHT: 173 LBS

## 2021-09-28 DIAGNOSIS — Z3A.30 30 WEEKS GESTATION OF PREGNANCY: ICD-10-CM

## 2021-09-28 DIAGNOSIS — O35.2XX0 PREVIOUS CHILD BORN WITH VENTRICULAR SEPTAL DEFECT, CURRENTLY PREGNANT, SINGLE OR UNSPECIFIED FETUS: Primary | ICD-10-CM

## 2021-09-28 PROCEDURE — PNV: Performed by: NURSE PRACTITIONER

## 2021-09-28 NOTE — PROGRESS NOTES
Denies loss of fluid, vaginal bleeding and abdominal pain  Confirms frequent fetal movement  Doing fetal kick counts  Tolerating prenatal vitamin and low-dose aspirin well  Twenty-eight week labs reviewed-WNL  Has follow-up  Center at 36 weeks  Normal fetal echo  Denies questions or concerns at today's visit  Patient plans include repeat  section, formula feeding and uncertain regarding postpartum contraception  BP: 102/70  Weight: +27lb  Plan:  - Continue prenatal vitamins and low-dose aspirin daily  -continue fetal kick counts daily  -follow-up  Center as scheduled for 36 weeks  -desires flu vaccine at next visit  -common discomforts of pregnancy and precautions including  labor reviewed  Signs and symptoms report reviewed    Written information provided about COVID-19  RTO 2 weeks f/u PP contraception

## 2021-09-28 NOTE — PATIENT INSTRUCTIONS
COVID-19 and Pregnancy   AMBULATORY CARE:   What you need to know about coronavirus disease 2019 (COVID-19) and pregnancy:  Pregnancy increases your risk for severe COVID-19 illness  COVID-19 can also lead to  delivery of your baby  Most babies who become infected with the new virus do not develop serious effects, but some do  It is important for you and your baby to stay safe during pregnancy and delivery  Signs and symptoms of COVID-19 in newborns: The following signs and symptoms may be from COVID-19, but they are also common in newborns  Your 's healthcare provider may recommend testing to confirm or rule out COVID-19  Your  may need a second test if the first is negative  · Fever    · Not moving arms or legs much, or being too sleepy to feed    · A runny nose or cough    · Fast breathing, or trouble breathing    · Vomiting, diarrhea, or not feeding well    If you think you, your baby, or someone in your home may be infected:  Do the following to protect others:  · If emergency care is needed,  tell the  about the possible infection, or call ahead and tell the emergency department  · Call a healthcare provider  for instructions if symptoms are mild  Anyone who may be infected should not  arrive without calling first  The provider will need to protect staff members and other patients  · The person who may be infected needs to wear a face covering  while getting medical care  This will help lower the risk of infecting others  Coverings are not used for anyone who is younger than 2 years, has breathing problems, or cannot remove it  The provider can give you instructions for anyone who cannot wear a covering  Call your local emergency number (911 in the [address] [place] [address],[address]) or go to the emergency department if:   · You have trouble breathing or shortness of breath at rest     · You have chest pain or pressure that lasts longer than 5 minutes      · You become confused or hard to wake     · Your lips or face are blue  · You have a fever of 104°F (40°C) or higher  Call your doctor if:   · You have signs or symptoms of COVID-19  Try to call within 24 hours of when you start to feel sick  · You do not  have symptoms of COVID-19 but had close physical contact within 14 days with someone who tested positive  · You have questions or concerns about your condition or care  How the 2019 coronavirus spreads: The virus spreads quickly and easily  The virus can be passed starting 2 days before symptoms begin or before a positive test if symptoms never begin  The following are ways the virus is thought to spread, but more information may be coming:  · Droplets are the main way all coronaviruses spread  The virus travels in droplets that form when a person talks, coughs, or sneezes  The droplets can also float in the air for minutes or hours  Infection happens when you breathe in the droplets or get them in your eyes or nose  Close personal contact with an infected person increases your risk for infection  This means being within 6 feet (2 meters) of the person for at least 15 minutes over 24 hours  · Person-to-person contact can spread the virus  For example, a person with the virus on his or her hands can spread it by shaking hands with someone  · The virus can stay on objects and surfaces for a short time  You may become infected by touching the object or surface and then touching your eyes or mouth  · An infected animal may be able to infect a person who touches it  This may happen at live markets or on a farm  Protect yourself and your baby while you are pregnant: If you have COVID-19 during your pregnancy, healthcare providers will monitor you and your baby closely  Work with your healthcare provider or obstetrician  If you do not have either, experts recommend you contact a local community health center or health department   The best way to prevent infection is to avoid anyone who is infected, but this can be hard to do  An infected person can spread the virus before signs or symptoms develop, or even if signs or symptoms never develop  The following can help keep you and your baby safe:     · Wash your hands throughout the day  Use soap and water  Rub your soapy hands together, lacing your fingers  Wash the front and back of each hand, and in between your fingers  Use the fingers of one hand to scrub under the fingernails of the other hand  Wash for at least 20 seconds  Rinse with warm, running water for several seconds  Dry your hands with a clean towel or paper towel  Use hand  that contains alcohol if soap and water are not available  If you must go out, wash your hands before you leave your home and when you get home  Wash your hands after you put items away  Be careful about what you touch while you are out  · Protect yourself from sneezes and coughs  Turn your face away and cover your mouth and nose if you are around someone who is sneezing or coughing  This helps protect you from the person's droplets  Cover your mouth and nose with a tissue when you need to sneeze or cough  Use the bend of your arm if you do not have a tissue  Throw the tissue away  Then wash your hands or use hand   · Make a habit of not touching your face  If you get the virus on your hands, you can transfer it to your eyes, nose, or mouth and become infected  · Follow worldwide, national, and local social distancing guidelines  Social distancing means staying far enough away physically from others that the virus cannot spread from one person to another  If you must go out, avoid crowds and large gatherings  Gatherings or crowds of 10 or more individuals can cause the virus to spread  Avoid places such as [place], beaches, sporting events, and tourist attractions   For events such as parties, holiday meals, [employer] services, and conferences, attend virtually (on a computer), if possible  · Wear a face covering (mask) around anyone who does not live in your home  A covering helps protect the person wearing it from being infected or passing the virus to others  Do not  wear a plastic face shield instead of a covering  You can use both together for extra protection  Use a disposable non-medical mask, or make a cloth covering with at least 2 layers  You can also create layers by putting a cloth covering over a disposable non-medical mask  Cover your mouth and your nose  Securely fasten it under your chin and on the sides of your face  A face covering is not a substitute for other safety measures  Continue social distancing and washing your hands often  Do not put a face shield or covering on your   These increase the risk for sudden infant death syndrome (SIDS)  · Stay at least 6 feet (2 meters) away from anyone who does not live in your home  Keep this distance every time you go out of your home and are around another person  Do not shake hands with, hug, or kiss a person as a greeting  Stand or walk as far from others as possible, especially around anyone who is sneezing or coughing  If you must use public transportation (such as a bus or subway), try to sit or stand away from others  Do not go to someone else's home unless it is necessary  Do not go over to visit, even if you are lonely, or the person is  Only go if you need to help him or her  · Stay safe if you must go out to work  Keep physical distance between you and other workers as much as possible  Follow your employer's rules so everyone stays safe  · Clean and disinfect high-touch surfaces and objects in your home often  Use disinfecting wipes or make a solution of 4 teaspoons of bleach in 1 quart (4 cups) of water  Clean surfaces and objects in the room where your baby will be sleeping, especially right before you give birth  Wash your hands after you clean and disinfect   Be careful with cleaning products  Read the labels to make sure they are safe to use during pregnancy  Open windows to make sure you have good ventilation  What you can do to have a healthy pregnancy during the COVID-19 outbreak:   · Keep all prenatal and  appointments  You may be able to have certain prenatal appointments without having to go into the provider's office  Some providers offer phone, video, or other types of appointments  You may also be able to get prescriptions for a few months at a time  This will help lower the number of trips you need to make to the pharmacy for refills  If you do need to go into your provider's office, take precautions  Put a face covering on before you go into the office  Do not stand or sit within 6 feet (2 meters) of anyone in the waiting room, if possible  Do not stand or sit near anyone who is not wearing a face covering  · Get recommended vaccines  Your healthcare provider can tell you if you need vaccines not listed below, and when to get them  ? Ask about the COVID-19 vaccine  Your healthcare provider may recommend that you get the vaccine now if you are at high risk for COVID-19  Make sure you understand the risks and benefits of getting the vaccine during pregnancy  Do not get a COVID-19 vaccine until you and your healthcare provider decide it is right for you  Even after you get the vaccine, continue wearing a face covering, handwashing, and social distancing  These are still the best ways to prevent infection  ? Get the influenza (flu) vaccine  Try to get the vaccine as soon as recommended, usually starting in September or October  ? Get the Tdap vaccine  The Tdap vaccine protects you from tetanus, diphtheria, and pertussis  If possible, get the vaccine during weeks 27 to 36 of your pregnancy  You should get a dose of Tdap with each pregnancy  · Take prenatal vitamins as directed  Your prenatal vitamins should contain folic acid   You need about 600 micrograms (mcg) of folic acid each day during pregnancy  Folic acid helps to form your baby's brain and spinal cord in early pregnancy  · Eat a variety of healthy foods  Healthy foods are important, even if you take a prenatal vitamin  Healthy foods contain nutrients that help keep your immune system strong  Examples of healthy foods include vegetables, fruits, whole-grain breads and cereals, lean meats and poultry, fish, low-fat dairy products, and cooked beans  Do not have raw, undercooked, or unpasteurized food or drinks  Unpasteurized foods are foods that have not gone through the heating process (pasteurization) that destroys bacteria  Your healthcare provider or a dietitian can help you create healthy meal plans  · Talk to your healthcare provider about exercise  Moderate exercise can help keep your immune system strong  Your healthcare provider can help you plan an exercise program that is safe for you during pregnancy  You may need to exercise at home if you cannot exercise outdoors, such as walking in a park  If you want to do pregnancy yoga or other group activities, be safe  Stay at least 6 feet (2 meters) away from others in the class, and the instructor  Wash your hands before you leave the building  Follow the facility's instructions for preventing infections  · Try to lower your stress  You may be feeling more stressed than usual because of the COVID-19 outbreak  You may also feel stress from not being able to share your pregnancy with others  For example, you may not be able to have someone with you during prenatal visits or ultrasounds  Talk to your healthcare providers about ways to manage stress during this time  Pick 1 or 2 times a day to watch the news  Constant news watching about COVID-19 can increase your stress levels  Set a sleep schedule to go to bed and wake up at the same times each day  · Do not smoke cigarettes, drink alcohol, or use drugs    Nicotine and other chemicals in cigarettes and cigars can harm your baby and your health  Alcohol can increase your risk for a miscarriage  Your baby may also be born too small or have other health problems  Certain drugs can be passed to your baby before he or she is born  Some can be passed through breast milk  It is best to quit cigarettes, alcohol, and drugs before you become pregnant and not start again after your baby is born  Ask your healthcare provider for information if you currently use any of these and need help to quit  Protect your  during delivery and while you are in the hospital:  It is not known for sure if an unborn baby can be infected with the virus that causes COVID-19  Some newborns have tested positive for the virus  The newborns may have been infected before, during, or after birth  The greatest risk is for a  to be in close contact with an infected person  Your baby may be tested for the virus soon after being born if you have COVID-23  He or she may be tested again before you leave the hospital  This depends on whether your baby has any signs or symptoms of COVID-19  You will be able to make choices for you and your baby during your hospital stay  Talk to healthcare providers about the following:  · Ask about temporary separation if you have COVID-19  Temporary separation means your  is moved to a different room from you  You will be able to make the decision if you want to do this  Separation will help lower your 's risk for being infected  You will still be able to give your  breast milk  You may need to pump the milk from your breasts  Someone who does not have COVID-19 will then feed the pumped milk to your   You may instead choose to have your baby brought to you when you want to breastfeed  Take precautions to keep your baby safe  Wash your hands and the skin around your nipples before you hold your baby  Wear a face covering while you breastfeed      · Be careful if you have COVID-19 and do not choose temporary separation  Healthcare providers will keep your  at least 6 feet (2 meters) away from you as much as possible  Your  may be placed in an incubator  The incubator will help protect your  from infection  Always wash your hands and put on a face covering when you hold, touch, or have close contact with your   · Ask about visitors  The facility may not be allowing any visitors to newborns during this time  If you are allowed visitors, you may need to limit how many you can have at a time  Do not allow anyone who has known or suspected COVID-19 to visit  Even without signs or symptoms, the person can infect your  or others in the room  All visitors need to wash their hands and put on clean face coverings before entering your room  The face covering needs to stay on during the whole visit  Do not let anyone take the face covering down to make faces at your baby, talk, sneeze, or cough  Do not let anyone kiss you or your baby  Protect your  at home:   · You can choose to continue temporary separation if you have COVID-19  You can do this if an adult who does not have COVID-19 can care for your   Your healthcare provider can give you instructions on how to do this safely at home  Only have close contact with your  when needed  Remember to wash your hands and put on a clean face covering first  You may need to continue pumping your breast milk  A healthy adult can feed the pumped breast milk to your   You may instead choose to have your baby brought to you when you want to breastfeed  Take precautions to keep your baby safe  Wash your hands and the skin around your nipples before you hold your baby  You will also need to wear a face covering while you breastfeed  · Use face coverings safely    Everyone who has COVID-19 needs to wear a clean face covering while being within 6 feet (2 meters) of your   This includes other children in your home who are 2 years or older  Do not put a face covering or plastic face shield on your   Any covering increases your 's risk for sudden infant death syndrome (SIDS)  Do not use coverings on children younger than 2 years or on anyone who has breathing problems or cannot remove it  · Be careful about visitors  Continue precautions you used in the hospital  Do not allow anyone who has known or suspected COVID-19 to come over to see your   Have visitors put on clean face coverings before they enter your home  Have them wash their hands as soon as they come in  The face covering needs to stay on during the whole visit  · Keep all checkup appointments  You may be able to have some appointments by phone or video meeting  Other appointments will need to be in person, such as for vaccines  Vaccines are normally given to babies at certain ages  Until COVID-19 is under control, your 's provider will give you a vaccine schedule  It is important for your  to get all recommended vaccines  What you need to know about breastfeeding:  Breastfeeding for the first 6 months decreases your baby's risk for respiratory (lung) infections, allergies, asthma, and stomach problems  Breast milk also helps your baby develop a strong immune system  Breast milk is considered safe, even if you have COVID-19  Experts currently believe the virus that causes COVID-19 does not spread in breast milk  Do the following to help protect your baby:  · Wash your hands before every breastfeeding or pumping session  Even if you do not have COVID-19, you can transfer the virus from your hands to your baby or the pump  Use soap and water to wash your hands whenever possible  Use hand  that contains alcohol if soap and water are not available  · Clean and sanitize your breast pump after each use    Follow the 's directions for cleaning and sanitizing the pump  It is important not to use it until it is clean and sanitized  · If you have COVID-19:      ? Wear a face covering while you breastfeed or pump  This will help prevent you from passing the virus through droplets when you talk, cough, sneeze, or laugh  The virus can stay on surfaces such as a breast pump for hours to days  ? Have someone who is not infected bottle feed your baby, if possible  Have the person wash his or her hands with soap and water before each feeding  The person can feed your  pumped breast milk or formula  Follow up with your doctor or obstetrician as directed:  Write down your questions so you remember to ask them during your visits  For more information:   · Centers for Disease Control and Prevention  [address] , [address]  Phone: 6- 233 - [phone]  Web Address: [employer]      [address] [place] [address]  Information is for End User's use only and may not be sold, redistributed or otherwise used for commercial purposes  All illustrations and images included in CareNotes® are the copyrighted property of [employer]  or [employer]   The above information is an  only  It is not intended as medical advice for individual conditions or treatments  Talk to your doctor, nurse or pharmacist before following any medical regimen to see if it is safe and effective for you  Kick Counts in Pregnancy   AMBULATORY CARE:   Kick counts  measure how much your baby is moving in your womb  A kick from your baby can be felt as a twist, turn, swish, roll, or jab  It is common to feel your baby kicking at 26 to 28 weeks of pregnancy  You may feel your baby kick as early as 20 weeks of pregnancy  You may want to start counting at 28 weeks  Contact your healthcare provider immediately if:   · You feel a change in the number of kicks or movements of your baby  · You feel fewer than 10 kicks within 2 hours       · You have questions or concerns about your baby's movements  Why measure kick counts:  Your baby's movement may provide information about your baby's health  He or she may move less, or not at all, if there are problems  Your baby may move less if he or she is not getting enough oxygen or nutrition from the placenta  Do not smoke while you are pregnant  Smoking decreases the amount of oxygen that gets to your baby  Talk to your healthcare provider if you need help to quit smoking  Tell your healthcare provider as soon as you feel a change in your baby's movements  When to measure kick counts:   · Measure kick counts at the same time every day  · Measure kick counts when your baby is awake and most active  Your baby may be most active in the evening  How to measure kick counts:  Check that your baby is awake before you measure kick counts  You can wake up your baby by lightly pushing on your belly, walking, or drinking something cold  Your healthcare provider may tell you different ways to measure kick counts  You may be told to do the following:  · Use a chart or clock to keep track of the time you start and finish counting  · Sit in a chair or lie on your left side  · Place your hands on the largest part of your belly  · Count until you reach 10 kicks  Write down how much time it takes to count 10 kicks  · It may take 30 minutes to 2 hours to count 10 kicks  It should not take more than 2 hours to count 10 kicks  Follow up with your healthcare provider as directed:  Write down your questions so you remember to ask them during your visits  © Copyright [employer] 2021 Information is for End User's use only and may not be sold, redistributed or otherwise used for commercial purposes  All illustrations and images included in CareNotes® are the copyrighted property of [employer]  or [facility] Eveline Alexander   The above information is an  only   It is not intended as medical advice for individual conditions or treatments  Talk to your doctor, nurse or pharmacist before following any medical regimen to see if it is safe and effective for you  Pregnancy at [address]:   What changes are happening in my body? You may continue to have symptoms such as shortness of breath, heartburn, contractions, or swelling of your ankles and feet  You may be gaining about 1 pound a week now  How do I care for myself at this stage of my pregnancy? · Eat a variety of healthy foods  Healthy foods include fruits, vegetables, whole-grain breads, low-fat dairy foods, beans, lean meats, and fish  Drink liquids as directed  Ask how much liquid to drink each day and which liquids are best for you  Limit caffeine to less than 200 milligrams each day  Limit your intake of fish to 2 servings each week  Choose fish low in mercury such as canned light tuna, shrimp, salmon, cod, or tilapia  Do not  eat fish high in mercury such as swordfish, tilefish, manny mackerel, and shark  · Manage heartburn  by eating 4 or 5 small meals each day instead of large meals  Avoid spicy food  · Manage swelling  by lying down and putting your feet up  · Take prenatal vitamins as directed  Your need for certain vitamins and minerals, such as folic acid, increases during pregnancy  Prenatal vitamins provide some of the extra vitamins and minerals you need  Prenatal vitamins may also help to decrease the risk of certain birth defects  · Talk to your healthcare provider about exercise  Moderate exercise can help you stay fit  Your healthcare provider will help you plan an exercise program that is safe for you during pregnancy  · Do not smoke  Smoking increases your risk of a miscarriage and other health problems during your pregnancy  Smoking can cause your baby to be born too early or weigh less at birth   Ask your healthcare provider for information if you need help quitting  · Do not drink alcohol  Alcohol passes from your body to your baby through the placenta  It can affect your baby's brain development and cause fetal alcohol syndrome (FAS)  FAS is a group of conditions that causes mental, behavior, and growth problems  · Talk to your healthcare provider before you take any medicines  Many medicines may harm your baby if you take them when you are pregnant  Do not take any medicines, vitamins, herbs, or supplements without first talking to your healthcare provider  Never use illegal or street drugs (such as marijuana or cocaine) while you are pregnant  What are some safety tips during pregnancy? · Avoid hot tubs and saunas  Do not use a hot tub or sauna while you are pregnant, especially during your first trimester  Hot tubs and saunas may raise your baby's temperature and increase the risk of birth defects  · Avoid toxoplasmosis  This is an infection caused by eating raw meat or being around infected cat feces  It can cause birth defects, miscarriages, and other problems  Wash your hands after you touch raw meat  Make sure any meat is well-cooked before you eat it  Avoid raw eggs and unpasteurized milk  Use gloves or ask someone else to clean your cat's litter box while you are pregnant  What changes are happening with my baby? By 34 weeks, your baby may weigh more than 5 pounds  Your baby will be about 12 ½ inches long from the top of the head to the rump (baby's bottom)  Your baby is gaining about ½ pound a week  Your baby's eyes open and close now  Your baby's kicks and movements are more forceful at this time  What do I need to know about prenatal care? Your healthcare provider will check your blood pressure and weight  You may also need the following:  · A urine test  may also be done to check for sugar and protein  These can be signs of gestational diabetes or infection  Protein in your urine may also be a sign of preeclampsia   Preeclampsia is a condition that can develop during week 20 or later of your pregnancy  It causes high blood pressure, and it can cause problems with your kidneys and other organs  · A Tdap vaccine  may be recommended by your healthcare provider  · Fundal height  is a measurement of your uterus to check your baby's growth  This number is usually the same as the number of weeks that you have been pregnant  Your healthcare provider may also check your baby's position  · Your baby's heart rate  will be checked  When should I seek immediate care? · You develop a severe headache that does not go away  · You have new or increased vision changes, such as blurred or spotted vision  · You have new or increased swelling in your face or hands  · You have vaginal spotting or bleeding  · Your water broke or you feel warm water gushing or trickling from your vagina  When should I contact my healthcare provider? · You have more than 5 contractions in 1 hour  · You notice any changes in your baby's movements  · You have abdominal cramps, pressure, or tightening  · You have a change in vaginal discharge  · You have chills or a fever  · You have vaginal itching, burning, or pain  · You have yellow, green, white, or foul-smelling vaginal discharge  · You have pain or burning when you urinate, less urine than usual, or pink or bloody urine  · You have questions or concerns about your condition or care  CARE AGREEMENT:   You have the right to help plan your care  Learn about your health condition and how it may be treated  Discuss treatment options with your healthcare providers to decide what care you want to receive  You always have the right to refuse treatment  The above information is an  only  It is not intended as medical advice for individual conditions or treatments   Talk to your doctor, nurse or pharmacist before following any medical regimen to see if it is safe and effective for you  © Copyright [employer] 2021 Information is for End User's use only and may not be sold, redistributed or otherwise used for commercial purposes   All illustrations and images included in CareNotes® are the copyrighted property of A D A M [employer] Inc  or Kathie Seals

## 2021-10-12 ENCOUNTER — ROUTINE PRENATAL (OUTPATIENT)
Dept: OBGYN CLINIC | Facility: MEDICAL CENTER | Age: 31
End: 2021-10-12
Payer: COMMERCIAL

## 2021-10-12 ENCOUNTER — TELEPHONE (OUTPATIENT)
Dept: OBGYN CLINIC | Facility: MEDICAL CENTER | Age: 31
End: 2021-10-12

## 2021-10-12 VITALS — BODY MASS INDEX: 28.73 KG/M2 | WEIGHT: 178 LBS | SYSTOLIC BLOOD PRESSURE: 120 MMHG | DIASTOLIC BLOOD PRESSURE: 80 MMHG

## 2021-10-12 DIAGNOSIS — Z23 NEED FOR INFLUENZA VACCINATION: ICD-10-CM

## 2021-10-12 DIAGNOSIS — Z34.93 THIRD TRIMESTER PREGNANCY: ICD-10-CM

## 2021-10-12 DIAGNOSIS — Z98.891 STATUS POST PRIMARY LOW TRANSVERSE CESAREAN SECTION: ICD-10-CM

## 2021-10-12 DIAGNOSIS — Z3A.32 32 WEEKS GESTATION OF PREGNANCY: Primary | ICD-10-CM

## 2021-10-12 DIAGNOSIS — O22.43 HEMORRHOIDS DURING PREGNANCY IN THIRD TRIMESTER: ICD-10-CM

## 2021-10-12 DIAGNOSIS — Z82.49 FAMILY HISTORY OF CONGENITAL CARDIAC SEPTAL DEFECT: ICD-10-CM

## 2021-10-12 PROCEDURE — 90686 IIV4 VACC NO PRSV 0.5 ML IM: CPT | Performed by: OBSTETRICS & GYNECOLOGY

## 2021-10-12 PROCEDURE — 90471 IMMUNIZATION ADMIN: CPT | Performed by: OBSTETRICS & GYNECOLOGY

## 2021-10-12 PROCEDURE — PNV: Performed by: OBSTETRICS & GYNECOLOGY

## 2021-10-28 ENCOUNTER — ROUTINE PRENATAL (OUTPATIENT)
Dept: OBGYN CLINIC | Facility: CLINIC | Age: 31
End: 2021-10-28

## 2021-10-28 VITALS — WEIGHT: 180 LBS | SYSTOLIC BLOOD PRESSURE: 115 MMHG | DIASTOLIC BLOOD PRESSURE: 70 MMHG | BODY MASS INDEX: 29.05 KG/M2

## 2021-10-28 DIAGNOSIS — Z3A.35 35 WEEKS GESTATION OF PREGNANCY: Primary | ICD-10-CM

## 2021-10-28 DIAGNOSIS — Z82.49 FAMILY HISTORY OF CONGENITAL CARDIAC SEPTAL DEFECT: ICD-10-CM

## 2021-10-28 DIAGNOSIS — Z98.891 H/O CESAREAN SECTION: ICD-10-CM

## 2021-10-28 PROCEDURE — PNV: Performed by: OBSTETRICS & GYNECOLOGY

## 2021-11-01 ENCOUNTER — ULTRASOUND (OUTPATIENT)
Dept: PERINATAL CARE | Facility: OTHER | Age: 31
End: 2021-11-01
Payer: COMMERCIAL

## 2021-11-01 VITALS
HEIGHT: 66 IN | BODY MASS INDEX: 29.6 KG/M2 | WEIGHT: 184.2 LBS | SYSTOLIC BLOOD PRESSURE: 127 MMHG | DIASTOLIC BLOOD PRESSURE: 83 MMHG

## 2021-11-01 DIAGNOSIS — O35.2XX0 PREVIOUS CHILD BORN WITH VENTRICULAR SEPTAL DEFECT, CURRENTLY PREGNANT, SINGLE OR UNSPECIFIED FETUS: ICD-10-CM

## 2021-11-01 DIAGNOSIS — Z3A.35 35 WEEKS GESTATION OF PREGNANCY: ICD-10-CM

## 2021-11-01 DIAGNOSIS — Z98.891 STATUS POST PRIMARY LOW TRANSVERSE CESAREAN SECTION: Primary | ICD-10-CM

## 2021-11-01 PROCEDURE — 76816 OB US FOLLOW-UP PER FETUS: CPT | Performed by: OBSTETRICS & GYNECOLOGY

## 2021-11-01 RX ORDER — RIBOFLAVIN (VITAMIN B2) 100 MG
100 TABLET ORAL DAILY
COMMUNITY

## 2021-11-11 ENCOUNTER — ROUTINE PRENATAL (OUTPATIENT)
Dept: OBGYN CLINIC | Facility: MEDICAL CENTER | Age: 31
End: 2021-11-11

## 2021-11-11 VITALS — WEIGHT: 184 LBS | SYSTOLIC BLOOD PRESSURE: 110 MMHG | DIASTOLIC BLOOD PRESSURE: 70 MMHG | BODY MASS INDEX: 29.7 KG/M2

## 2021-11-11 DIAGNOSIS — Z34.83 ENCOUNTER FOR SUPERVISION OF OTHER NORMAL PREGNANCY IN THIRD TRIMESTER: Primary | ICD-10-CM

## 2021-11-11 DIAGNOSIS — O34.211 MATERNAL CARE DUE TO LOW TRANSVERSE UTERINE SCAR FROM PREVIOUS CESAREAN DELIVERY: ICD-10-CM

## 2021-11-11 DIAGNOSIS — Z3A.37 37 WEEKS GESTATION OF PREGNANCY: ICD-10-CM

## 2021-11-11 PROCEDURE — 87150 DNA/RNA AMPLIFIED PROBE: CPT | Performed by: OBSTETRICS & GYNECOLOGY

## 2021-11-11 PROCEDURE — PNV: Performed by: OBSTETRICS & GYNECOLOGY

## 2021-11-13 LAB — GP B STREP DNA SPEC QL NAA+PROBE: NEGATIVE

## 2021-11-18 ENCOUNTER — ROUTINE PRENATAL (OUTPATIENT)
Dept: OBGYN CLINIC | Facility: MEDICAL CENTER | Age: 31
End: 2021-11-18

## 2021-11-18 VITALS — DIASTOLIC BLOOD PRESSURE: 80 MMHG | SYSTOLIC BLOOD PRESSURE: 115 MMHG | WEIGHT: 184 LBS | BODY MASS INDEX: 29.7 KG/M2

## 2021-11-18 DIAGNOSIS — Z3A.38 38 WEEKS GESTATION OF PREGNANCY: Primary | ICD-10-CM

## 2021-11-18 PROCEDURE — PNV: Performed by: OBSTETRICS & GYNECOLOGY

## 2021-11-24 ENCOUNTER — ROUTINE PRENATAL (OUTPATIENT)
Dept: OBGYN CLINIC | Facility: MEDICAL CENTER | Age: 31
End: 2021-11-24

## 2021-11-24 VITALS — WEIGHT: 186 LBS | DIASTOLIC BLOOD PRESSURE: 70 MMHG | BODY MASS INDEX: 30.02 KG/M2 | SYSTOLIC BLOOD PRESSURE: 115 MMHG

## 2021-11-24 DIAGNOSIS — Z98.891 STATUS POST PRIMARY LOW TRANSVERSE CESAREAN SECTION: ICD-10-CM

## 2021-11-24 DIAGNOSIS — Z3A.39 39 WEEKS GESTATION OF PREGNANCY: Primary | ICD-10-CM

## 2021-11-24 PROCEDURE — PNV: Performed by: OBSTETRICS & GYNECOLOGY

## 2021-11-26 ENCOUNTER — HOSPITAL ENCOUNTER (INPATIENT)
Facility: HOSPITAL | Age: 31
LOS: 2 days | Discharge: HOME | End: 2021-11-28
Attending: OBSTETRICS & GYNECOLOGY | Admitting: OBSTETRICS & GYNECOLOGY
Payer: COMMERCIAL

## 2021-11-26 ENCOUNTER — ANESTHESIA EVENT (INPATIENT)
Dept: LABOR AND DELIVERY | Facility: HOSPITAL | Age: 31
End: 2021-11-26
Payer: COMMERCIAL

## 2021-11-26 ENCOUNTER — ANESTHESIA (INPATIENT)
Dept: LABOR AND DELIVERY | Facility: HOSPITAL | Age: 31
End: 2021-11-26
Payer: COMMERCIAL

## 2021-11-26 DIAGNOSIS — Z98.891 STATUS POST REPEAT LOW TRANSVERSE CESAREAN SECTION: Primary | ICD-10-CM

## 2021-11-26 DIAGNOSIS — Z3A.38 38 WEEKS GESTATION OF PREGNANCY: ICD-10-CM

## 2021-11-26 PROBLEM — Z3A.39 39 WEEKS GESTATION OF PREGNANCY: Status: ACTIVE | Noted: 2021-05-25

## 2021-11-26 LAB
ABO GROUP BLD: NORMAL
BASE EXCESS BLDCOA CALC-SCNC: -7.9 MMOL/L (ref 3–11)
BASE EXCESS BLDCOV CALC-SCNC: -8.1 MMOL/L (ref 1–9)
BASOPHILS # BLD AUTO: 0.05 THOUSANDS/ΜL (ref 0–0.1)
BASOPHILS NFR BLD AUTO: 1 % (ref 0–1)
BLD GP AB SCN SERPL QL: NEGATIVE
EOSINOPHIL # BLD AUTO: 0.06 THOUSAND/ΜL (ref 0–0.61)
EOSINOPHIL NFR BLD AUTO: 1 % (ref 0–6)
ERYTHROCYTE [DISTWIDTH] IN BLOOD BY AUTOMATED COUNT: 12.3 % (ref 11.6–15.1)
HCO3 BLDCOA-SCNC: 23.5 MMOL/L (ref 17.3–27.3)
HCO3 BLDCOV-SCNC: 23 MMOL/L (ref 12.2–28.6)
HCT VFR BLD AUTO: 39.6 % (ref 34.8–46.1)
HGB BLD-MCNC: 13.6 G/DL (ref 11.5–15.4)
IMM GRANULOCYTES # BLD AUTO: 0.07 THOUSAND/UL (ref 0–0.2)
IMM GRANULOCYTES NFR BLD AUTO: 1 % (ref 0–2)
LYMPHOCYTES # BLD AUTO: 2.61 THOUSANDS/ΜL (ref 0.6–4.47)
LYMPHOCYTES NFR BLD AUTO: 26 % (ref 14–44)
MCH RBC QN AUTO: 32.9 PG (ref 26.8–34.3)
MCHC RBC AUTO-ENTMCNC: 34.3 G/DL (ref 31.4–37.4)
MCV RBC AUTO: 96 FL (ref 82–98)
MONOCYTES # BLD AUTO: 0.86 THOUSAND/ΜL (ref 0.17–1.22)
MONOCYTES NFR BLD AUTO: 9 % (ref 4–12)
NEUTROPHILS # BLD AUTO: 6.31 THOUSANDS/ΜL (ref 1.85–7.62)
NEUTS SEG NFR BLD AUTO: 62 % (ref 43–75)
NRBC BLD AUTO-RTO: 0 /100 WBCS
O2 CT VFR BLDCOA CALC: 8.1 ML/DL
OXYHGB MFR BLDCOA: 35.1 %
OXYHGB MFR BLDCOV: 20.5 %
PCO2 BLDCOA: 75.6 MM[HG] (ref 30–60)
PCO2 BLDCOV: 72.5 MM HG (ref 27–43)
PH BLDCOA: 7.11 [PH] (ref 7.23–7.43)
PH BLDCOV: 7.12 [PH] (ref 7.19–7.49)
PLATELET # BLD AUTO: 159 THOUSANDS/UL (ref 149–390)
PMV BLD AUTO: 13.3 FL (ref 8.9–12.7)
PO2 BLDCOA: 20.3 MM HG (ref 5–25)
PO2 BLDCOV: 14.8 MM HG (ref 15–45)
RBC # BLD AUTO: 4.13 MILLION/UL (ref 3.81–5.12)
RH BLD: POSITIVE
SAO2 % BLDCOV: 4.7 ML/DL
SPECIMEN EXPIRATION DATE: NORMAL
WBC # BLD AUTO: 9.96 THOUSAND/UL (ref 4.31–10.16)

## 2021-11-26 PROCEDURE — 59510 CESAREAN DELIVERY: CPT | Performed by: OBSTETRICS & GYNECOLOGY

## 2021-11-26 PROCEDURE — 86592 SYPHILIS TEST NON-TREP QUAL: CPT | Performed by: OBSTETRICS & GYNECOLOGY

## 2021-11-26 PROCEDURE — 85025 COMPLETE CBC W/AUTO DIFF WBC: CPT | Performed by: OBSTETRICS & GYNECOLOGY

## 2021-11-26 PROCEDURE — 82805 BLOOD GASES W/O2 SATURATION: CPT | Performed by: OBSTETRICS & GYNECOLOGY

## 2021-11-26 PROCEDURE — 86900 BLOOD TYPING SEROLOGIC ABO: CPT | Performed by: OBSTETRICS & GYNECOLOGY

## 2021-11-26 PROCEDURE — 86850 RBC ANTIBODY SCREEN: CPT | Performed by: OBSTETRICS & GYNECOLOGY

## 2021-11-26 PROCEDURE — 86901 BLOOD TYPING SEROLOGIC RH(D): CPT | Performed by: OBSTETRICS & GYNECOLOGY

## 2021-11-26 PROCEDURE — NC001 PR NO CHARGE: Performed by: OBSTETRICS & GYNECOLOGY

## 2021-11-26 PROCEDURE — 4A1HXCZ MONITORING OF PRODUCTS OF CONCEPTION, CARDIAC RATE, EXTERNAL APPROACH: ICD-10-PCS | Performed by: OBSTETRICS & GYNECOLOGY

## 2021-11-26 RX ORDER — CEFAZOLIN SODIUM 2 G/50ML
2000 SOLUTION INTRAVENOUS ONCE
Status: DISCONTINUED | OUTPATIENT
Start: 2021-11-26 | End: 2021-11-26

## 2021-11-26 RX ORDER — CEFAZOLIN SODIUM 2 G/50ML
SOLUTION INTRAVENOUS AS NEEDED
Status: DISCONTINUED | OUTPATIENT
Start: 2021-11-26 | End: 2021-11-26

## 2021-11-26 RX ORDER — OXYCODONE HYDROCHLORIDE 10 MG/1
10 TABLET ORAL EVERY 4 HOURS PRN
Status: DISCONTINUED | OUTPATIENT
Start: 2021-11-27 | End: 2021-11-28 | Stop reason: HOSPADM

## 2021-11-26 RX ORDER — KETOROLAC TROMETHAMINE 30 MG/ML
INJECTION, SOLUTION INTRAMUSCULAR; INTRAVENOUS AS NEEDED
Status: DISCONTINUED | OUTPATIENT
Start: 2021-11-26 | End: 2021-11-26

## 2021-11-26 RX ORDER — DIAPER,BRIEF,INFANT-TODD,DISP
1 EACH MISCELLANEOUS DAILY PRN
Status: DISCONTINUED | OUTPATIENT
Start: 2021-11-26 | End: 2021-11-28 | Stop reason: HOSPADM

## 2021-11-26 RX ORDER — CALCIUM CARBONATE 200(500)MG
1000 TABLET,CHEWABLE ORAL 3 TIMES DAILY PRN
Status: DISCONTINUED | OUTPATIENT
Start: 2021-11-26 | End: 2021-11-28 | Stop reason: HOSPADM

## 2021-11-26 RX ORDER — MORPHINE SULFATE 0.5 MG/ML
INJECTION, SOLUTION EPIDURAL; INTRATHECAL; INTRAVENOUS
Status: DISPENSED
Start: 2021-11-26 | End: 2021-11-26

## 2021-11-26 RX ORDER — DIPHENHYDRAMINE HYDROCHLORIDE 50 MG/ML
25 INJECTION INTRAMUSCULAR; INTRAVENOUS EVERY 6 HOURS PRN
Status: DISCONTINUED | OUTPATIENT
Start: 2021-11-27 | End: 2021-11-28 | Stop reason: HOSPADM

## 2021-11-26 RX ORDER — ONDANSETRON 2 MG/ML
4 INJECTION INTRAMUSCULAR; INTRAVENOUS EVERY 8 HOURS PRN
Status: DISCONTINUED | OUTPATIENT
Start: 2021-11-27 | End: 2021-11-28 | Stop reason: HOSPADM

## 2021-11-26 RX ORDER — SIMETHICONE 80 MG
80 TABLET,CHEWABLE ORAL 4 TIMES DAILY PRN
Status: DISCONTINUED | OUTPATIENT
Start: 2021-11-26 | End: 2021-11-28 | Stop reason: HOSPADM

## 2021-11-26 RX ORDER — OXYTOCIN/RINGER'S LACTATE 30/500 ML
62.5 PLASTIC BAG, INJECTION (ML) INTRAVENOUS ONCE
Status: DISCONTINUED | OUTPATIENT
Start: 2021-11-26 | End: 2021-11-28

## 2021-11-26 RX ORDER — OXYCODONE HYDROCHLORIDE 5 MG/1
5 TABLET ORAL EVERY 4 HOURS PRN
Status: DISCONTINUED | OUTPATIENT
Start: 2021-11-27 | End: 2021-11-28 | Stop reason: HOSPADM

## 2021-11-26 RX ORDER — IBUPROFEN 600 MG/1
600 TABLET ORAL EVERY 6 HOURS SCHEDULED
Status: DISCONTINUED | OUTPATIENT
Start: 2021-11-27 | End: 2021-11-28 | Stop reason: HOSPADM

## 2021-11-26 RX ORDER — BUPIVACAINE HYDROCHLORIDE 7.5 MG/ML
INJECTION, SOLUTION INTRASPINAL AS NEEDED
Status: DISCONTINUED | OUTPATIENT
Start: 2021-11-26 | End: 2021-11-26

## 2021-11-26 RX ORDER — OXYTOCIN/RINGER'S LACTATE 30/500 ML
PLASTIC BAG, INJECTION (ML) INTRAVENOUS CONTINUOUS PRN
Status: DISCONTINUED | OUTPATIENT
Start: 2021-11-26 | End: 2021-11-26

## 2021-11-26 RX ORDER — MORPHINE SULFATE 1 MG/ML
INJECTION, SOLUTION EPIDURAL; INTRATHECAL; INTRAVENOUS AS NEEDED
Status: DISCONTINUED | OUTPATIENT
Start: 2021-11-26 | End: 2021-11-26

## 2021-11-26 RX ORDER — EPHEDRINE SULFATE 50 MG/ML
INJECTION INTRAVENOUS AS NEEDED
Status: DISCONTINUED | OUTPATIENT
Start: 2021-11-26 | End: 2021-11-26

## 2021-11-26 RX ORDER — ACETAMINOPHEN 325 MG/1
975 TABLET ORAL EVERY 6 HOURS SCHEDULED
Status: DISCONTINUED | OUTPATIENT
Start: 2021-11-26 | End: 2021-11-28 | Stop reason: HOSPADM

## 2021-11-26 RX ORDER — ONDANSETRON 2 MG/ML
4 INJECTION INTRAMUSCULAR; INTRAVENOUS EVERY 8 HOURS PRN
Status: DISCONTINUED | OUTPATIENT
Start: 2021-11-26 | End: 2021-11-26

## 2021-11-26 RX ORDER — SODIUM CHLORIDE, SODIUM LACTATE, POTASSIUM CHLORIDE, CALCIUM CHLORIDE 600; 310; 30; 20 MG/100ML; MG/100ML; MG/100ML; MG/100ML
125 INJECTION, SOLUTION INTRAVENOUS CONTINUOUS
Status: DISCONTINUED | OUTPATIENT
Start: 2021-11-26 | End: 2021-11-28

## 2021-11-26 RX ORDER — SODIUM CHLORIDE, SODIUM LACTATE, POTASSIUM CHLORIDE, CALCIUM CHLORIDE 600; 310; 30; 20 MG/100ML; MG/100ML; MG/100ML; MG/100ML
125 INJECTION, SOLUTION INTRAVENOUS CONTINUOUS
Status: DISCONTINUED | OUTPATIENT
Start: 2021-11-26 | End: 2021-11-26

## 2021-11-26 RX ADMIN — SODIUM CHLORIDE, SODIUM LACTATE, POTASSIUM CHLORIDE, AND CALCIUM CHLORIDE 999 ML/HR: .6; .31; .03; .02 INJECTION, SOLUTION INTRAVENOUS at 07:00

## 2021-11-26 RX ADMIN — ACETAMINOPHEN 975 MG: 325 TABLET, FILM COATED ORAL at 18:19

## 2021-11-26 RX ADMIN — SODIUM CHLORIDE, SODIUM LACTATE, POTASSIUM CHLORIDE, AND CALCIUM CHLORIDE 999 ML/HR: .6; .31; .03; .02 INJECTION, SOLUTION INTRAVENOUS at 08:53

## 2021-11-26 RX ADMIN — SODIUM CHLORIDE, SODIUM LACTATE, POTASSIUM CHLORIDE, AND CALCIUM CHLORIDE 125 ML/HR: .6; .31; .03; .02 INJECTION, SOLUTION INTRAVENOUS at 06:00

## 2021-11-26 RX ADMIN — Medication 999 MILLI-UNITS/MIN: at 14:16

## 2021-11-26 RX ADMIN — BUPIVACAINE HYDROCHLORIDE IN DEXTROSE 1.6 ML: 7.5 INJECTION, SOLUTION SUBARACHNOID at 14:00

## 2021-11-26 RX ADMIN — KETOROLAC TROMETHAMINE 30 MG: 30 INJECTION, SOLUTION INTRAMUSCULAR at 14:32

## 2021-11-26 RX ADMIN — MORPHINE SULFATE 0.4 MG: 1 INJECTION, SOLUTION EPIDURAL; INTRATHECAL; INTRAVENOUS at 14:00

## 2021-11-26 RX ADMIN — CEFAZOLIN SODIUM 2000 MG: 2 SOLUTION INTRAVENOUS at 14:01

## 2021-11-26 RX ADMIN — EPHEDRINE SULFATE 25 MG: 50 INJECTION, SOLUTION INTRAVENOUS at 14:05

## 2021-11-27 LAB
ERYTHROCYTE [DISTWIDTH] IN BLOOD BY AUTOMATED COUNT: 12.3 % (ref 11.6–15.1)
HCT VFR BLD AUTO: 28.3 % (ref 34.8–46.1)
HGB BLD-MCNC: 9.6 G/DL (ref 11.5–15.4)
MCH RBC QN AUTO: 33.6 PG (ref 26.8–34.3)
MCHC RBC AUTO-ENTMCNC: 33.9 G/DL (ref 31.4–37.4)
MCV RBC AUTO: 99 FL (ref 82–98)
PLATELET # BLD AUTO: 115 THOUSANDS/UL (ref 149–390)
PMV BLD AUTO: 12.3 FL (ref 8.9–12.7)
RBC # BLD AUTO: 2.86 MILLION/UL (ref 3.81–5.12)
WBC # BLD AUTO: 10.49 THOUSAND/UL (ref 4.31–10.16)

## 2021-11-27 PROCEDURE — 85027 COMPLETE CBC AUTOMATED: CPT | Performed by: OBSTETRICS & GYNECOLOGY

## 2021-11-27 PROCEDURE — 99024 POSTOP FOLLOW-UP VISIT: CPT | Performed by: OBSTETRICS & GYNECOLOGY

## 2021-11-27 RX ADMIN — IBUPROFEN 600 MG: 600 TABLET ORAL at 15:41

## 2021-11-27 RX ADMIN — SODIUM CHLORIDE, SODIUM LACTATE, POTASSIUM CHLORIDE, AND CALCIUM CHLORIDE 125 ML/HR: .6; .31; .03; .02 INJECTION, SOLUTION INTRAVENOUS at 03:30

## 2021-11-27 RX ADMIN — ACETAMINOPHEN 975 MG: 325 TABLET, FILM COATED ORAL at 06:15

## 2021-11-27 RX ADMIN — ACETAMINOPHEN 975 MG: 325 TABLET, FILM COATED ORAL at 11:59

## 2021-11-27 RX ADMIN — ACETAMINOPHEN 975 MG: 325 TABLET, FILM COATED ORAL at 00:43

## 2021-11-27 RX ADMIN — ACETAMINOPHEN 975 MG: 325 TABLET, FILM COATED ORAL at 17:44

## 2021-11-28 VITALS
RESPIRATION RATE: 20 BRPM | DIASTOLIC BLOOD PRESSURE: 78 MMHG | HEIGHT: 66 IN | WEIGHT: 186 LBS | OXYGEN SATURATION: 97 % | TEMPERATURE: 98.9 F | BODY MASS INDEX: 29.89 KG/M2 | HEART RATE: 76 BPM | SYSTOLIC BLOOD PRESSURE: 127 MMHG

## 2021-11-28 PROBLEM — Z98.891 STATUS POST PRIMARY LOW TRANSVERSE CESAREAN SECTION: Status: RESOLVED | Noted: 2017-09-13 | Resolved: 2021-11-28

## 2021-11-28 LAB — RPR SER QL: NORMAL

## 2021-11-28 PROCEDURE — 99024 POSTOP FOLLOW-UP VISIT: CPT | Performed by: OBSTETRICS & GYNECOLOGY

## 2021-11-28 RX ORDER — OXYCODONE HYDROCHLORIDE 5 MG/1
5 TABLET ORAL EVERY 4 HOURS PRN
Qty: 7 TABLET | Refills: 0 | Status: SHIPPED | OUTPATIENT
Start: 2021-11-28 | End: 2021-12-08

## 2021-11-28 RX ORDER — IBUPROFEN 600 MG/1
600 TABLET ORAL EVERY 6 HOURS SCHEDULED
Qty: 30 TABLET | Refills: 0
Start: 2021-11-28

## 2021-11-28 RX ORDER — ACETAMINOPHEN 325 MG/1
975 TABLET ORAL EVERY 6 HOURS SCHEDULED
Refills: 0
Start: 2021-11-28

## 2021-11-28 RX ADMIN — IBUPROFEN 600 MG: 600 TABLET ORAL at 00:18

## 2021-11-28 RX ADMIN — ACETAMINOPHEN 975 MG: 325 TABLET, FILM COATED ORAL at 11:45

## 2021-11-28 RX ADMIN — ACETAMINOPHEN 975 MG: 325 TABLET, FILM COATED ORAL at 04:41

## 2021-11-28 RX ADMIN — IBUPROFEN 600 MG: 600 TABLET ORAL at 05:52

## 2021-12-03 ENCOUNTER — OFFICE VISIT (OUTPATIENT)
Dept: OBGYN CLINIC | Facility: CLINIC | Age: 31
End: 2021-12-03

## 2021-12-03 VITALS — SYSTOLIC BLOOD PRESSURE: 130 MMHG | DIASTOLIC BLOOD PRESSURE: 80 MMHG | WEIGHT: 174 LBS | BODY MASS INDEX: 28.08 KG/M2

## 2021-12-03 DIAGNOSIS — Z98.891 STATUS POST REPEAT LOW TRANSVERSE CESAREAN SECTION: Primary | ICD-10-CM

## 2021-12-03 PROCEDURE — 99024 POSTOP FOLLOW-UP VISIT: CPT | Performed by: OBSTETRICS & GYNECOLOGY

## 2021-12-04 LAB — PLACENTA IN STORAGE: NORMAL

## 2021-12-06 ENCOUNTER — TELEPHONE (OUTPATIENT)
Dept: OBGYN CLINIC | Facility: MEDICAL CENTER | Age: 31
End: 2021-12-06

## 2021-12-10 ENCOUNTER — OFFICE VISIT (OUTPATIENT)
Dept: OBGYN CLINIC | Facility: CLINIC | Age: 31
End: 2021-12-10

## 2021-12-10 VITALS — WEIGHT: 166 LBS | BODY MASS INDEX: 26.79 KG/M2 | SYSTOLIC BLOOD PRESSURE: 125 MMHG | DIASTOLIC BLOOD PRESSURE: 80 MMHG

## 2021-12-10 DIAGNOSIS — Z98.891 STATUS POST REPEAT LOW TRANSVERSE CESAREAN SECTION: Primary | ICD-10-CM

## 2021-12-10 PROCEDURE — 99024 POSTOP FOLLOW-UP VISIT: CPT | Performed by: OBSTETRICS & GYNECOLOGY

## 2022-01-12 ENCOUNTER — POSTPARTUM VISIT (OUTPATIENT)
Dept: OBGYN CLINIC | Facility: CLINIC | Age: 32
End: 2022-01-12

## 2022-01-12 VITALS — SYSTOLIC BLOOD PRESSURE: 130 MMHG | BODY MASS INDEX: 26.79 KG/M2 | DIASTOLIC BLOOD PRESSURE: 80 MMHG | WEIGHT: 166 LBS

## 2022-01-12 PROCEDURE — 99024 POSTOP FOLLOW-UP VISIT: CPT | Performed by: OBSTETRICS & GYNECOLOGY

## 2022-01-12 NOTE — PROGRESS NOTES
Subjective     Elizabeth Fried is a 33 y o  female who presents for a postpartum visit  She is 6 weeks postpartum following a repeat  section  I have fully reviewed the prenatal and intrapartum course  The delivery was at term gestational weeks  Outcome: repeat  section, low transverse incision  Anesthesia: spinal  Postpartum course has been uncomplicated   Baby's course has been uncomplicated   Baby is feeding by bottle - Similac Alimentum  Bleeding no bleeding  Bowel function is normal  Bladder function is normal  Patient is not sexually active  Contraception method is vasectomy  Postpartum depression screening: negative  The following portions of the patient's history were reviewed and updated as appropriate: allergies, current medications, past family history, past medical history, past social history, past surgical history and problem list     Review of Systems  Pertinent items are noted in HPI       Objective     /80   Wt 75 3 kg (166 lb)   LMP 2022   Breastfeeding No Comment: Bottle  BMI 26 79 kg/m²    General:  alert and oriented, in no acute distress    Breasts:  deferred    Lungs: unlabored breathing    Abdomen: soft, non-tender; bowel sounds normal; no masses,  no organomegaly and incision clean close and intact      Assessment/Plan      postpartum exam  Pap smear not done at today's visit  1  Contraception: vasectomy  2  Incision well healed   3  Follow up in: 1 year  or as needed

## 2022-03-30 NOTE — ASSESSMENT & PLAN NOTE
For repeat c section   Considering 11/26-or 11/29      Plans for future children re- addressed   - she wants her  to have a vasectomy [employer] Counseling:  I discussed with the patient the risks of mycophenolate mofetil including but not limited to infection/immunosuppression, GI upset, hypokalemia, hypercholesterolemia, bone marrow suppression, lymphoproliferative disorders, malignancy, GI ulceration/bleed/perforation, colitis, interstitial lung disease, kidney failure, progressive multifocal leukoencephalopathy, and birth defects.  The patient understands that monitoring is required including a baseline creatinine and regular CBC testing. In addition, patient must alert us immediately if symptoms of infection or other concerning signs are noted.

## 2022-06-24 ENCOUNTER — APPOINTMENT (OUTPATIENT)
Dept: LAB | Facility: MEDICAL CENTER | Age: 32
End: 2022-06-24
Payer: COMMERCIAL

## 2022-06-24 ENCOUNTER — TELEPHONE (OUTPATIENT)
Dept: OBGYN CLINIC | Facility: MEDICAL CENTER | Age: 32
End: 2022-06-24

## 2022-06-24 DIAGNOSIS — N92.6 MISSED PERIOD: ICD-10-CM

## 2022-06-24 DIAGNOSIS — N92.6 MISSED PERIOD: Primary | ICD-10-CM

## 2022-06-24 LAB
B-HCG SERPL-ACNC: 3 MIU/ML
PROGEST SERPL-MCNC: 2 NG/ML

## 2022-06-24 PROCEDURE — 36415 COLL VENOUS BLD VENIPUNCTURE: CPT

## 2022-06-24 PROCEDURE — 84702 CHORIONIC GONADOTROPIN TEST: CPT

## 2022-06-24 PROCEDURE — 84144 ASSAY OF PROGESTERONE: CPT

## 2022-06-24 NOTE — TELEPHONE ENCOUNTER
Patient called in stating she got 2 positive home pregnancy tests and 2 negative. She is going on vacation tomorrow and would like to get early labs done so she can know if she can drink alcohol or not. LMP 5/24/22. 4w3d. Spoke with RO and she approved early labs. Added labs, advised patient to get them done and she will have to repeat in 2 weeks.

## 2022-10-21 ENCOUNTER — ANNUAL EXAM (OUTPATIENT)
Dept: OBGYN CLINIC | Facility: MEDICAL CENTER | Age: 32
End: 2022-10-21
Payer: COMMERCIAL

## 2022-10-21 VITALS
BODY MASS INDEX: 24.98 KG/M2 | SYSTOLIC BLOOD PRESSURE: 120 MMHG | WEIGHT: 155.4 LBS | HEIGHT: 66 IN | DIASTOLIC BLOOD PRESSURE: 60 MMHG

## 2022-10-21 DIAGNOSIS — Z01.419 ENCOUNTER FOR GYNECOLOGICAL EXAMINATION: Primary | ICD-10-CM

## 2022-10-21 PROCEDURE — S0612 ANNUAL GYNECOLOGICAL EXAMINA: HCPCS | Performed by: OBSTETRICS & GYNECOLOGY

## 2022-10-21 RX ORDER — FEXOFENADINE HCL 180 MG/1
TABLET ORAL
COMMUNITY
Start: 2022-09-01

## 2022-10-21 NOTE — PROGRESS NOTES
ASSESSMENT & PLAN: Bj Regalado is a 27 y o  [id number] with normal gynecologic exam     1   Routine well woman exam done today  2  Pap and HPV:  The patient's last pap and hpv was   It was normal     Pap and cotesting was not done today  Current ASCCP Guidelines reviewed  3   The following were reviewed in today's visit: breast self exam, family planning choices, exercise and healthy diet  CC:  Annual Gynecologic Examination    HPI: Bj Regalado is a 27 y o  [id number] who presents for annual gynecologic examination  She has the following concerns:  None /  has vasectomy scheduled      Health Maintenance:    She wears her seatbelt routinely  She does perform regular monthly self breast exams  She feels safe at home  Past Medical History:   Diagnosis Date   • Anemia     age 13 took iron supplements x6 months   • Asthma     as a child   • Female infertility    • Gluten intolerance    • Migraine     age 13    • Spontaneous      Last assessed: 16   • Varicella        Past Surgical History:   Procedure Laterality Date   •  SECTION     • MOUTH SURGERY      age 11   • [place]  DELIVERY ONLY N/A 2017    Procedure:  SECTION (); Surgeon: Delta Coronel MD;  Location: [facility];  Service: Obstetrics   • [place]  DELIVERY ONLY N/A 2021    Procedure: [address] () REPEAT;  Surgeon: Melodie Humphrey MD;  Location: [facility];  Service: Obstetrics   • SHOULDER SURGERY      age 23   • WISDOM TOOTH EXTRACTION         Past OB/Gyn History:  OB History        3    Para   2    Term   2       0    AB   1    Living   2       SAB   1    IAB   0    Ectopic   0    Multiple   0    Live Births   2               Pt does not have menstrual issues  =   History of sexually transmitted infection: No   History of abnormal pap smears: No      Patient is currently sexually active    heterosexual   The current method of family planning is none      Family History   Problem Relation Age of Onset   • Cancer Mother         tongue   • Hypertension Mother         essential   • Thyroid disease Mother    • Hypothyroidism Father    • Hypertension Father    • Thyroid disease Father    • Hypertension Sister    • Migraines Sister         headaches   • Cancer Maternal Grandmother    • Hypertension Maternal Grandmother         essential   • Breast cancer Maternal Grandmother         diagnosed in 66's   • Thyroid disease Maternal Grandmother    • Diabetes Maternal Grandfather    • Hypertension Maternal Grandfather         essential   • Lung cancer Maternal Grandfather    • Cancer Paternal Grandmother    • Uterine cancer Paternal Grandmother         diagnosed in 66's   • Colon cancer Neg Hx        Social History:  Social History     Socioeconomic History   • Marital status: /Civil Union     Spouse name: Not on file   • Number of children: Not on file   • Years of education: Not on file   • Highest education level: Not on file   Occupational History   • Not on file   Tobacco Use   • Smoking status: Never Smoker   • Smokeless tobacco: Never Used   Vaping Use   • Vaping Use: Never used   Substance and Sexual Activity   • Alcohol use: Not Currently     Comment: socially/prior to pregnancy only   • Drug use: Never   • Sexual activity: Yes     Partners: Male     Birth control/protection: None   Other Topics Concern   • Not on file   Social History Narrative    Attempting to conceive     Social Determinants of Health     Financial Resource Strain: Not on file   Food Insecurity: Not on file   Transportation Needs: Not on file   Physical Activity: Not on file   Stress: Not on file   Social Connections: Not on file   Intimate Partner Violence: Not on file   Housing Stability: Not on file       Allergies   Allergen Reactions   • Gluten Meal - Food Allergy Rash   • Penicillins Rash   • Sulfa Antibiotics Rash         Current Outpatient Medications:   • Ascorbic Acid (vitamin C) 100 MG tablet, Take 100 mg by mouth daily, Disp: , Rfl:   •  Cholecalciferol (Vitamin D) 50 MCG (2000 UT) tablet, , Disp: , Rfl:   •  fexofenadine (ALLEGRA) 180 MG tablet, , Disp: , Rfl:   •  acetaminophen (TYLENOL) 325 mg tablet, Take 3 tablets (975 mg total) by mouth every 6 (six) hours, Disp: , Rfl: 0  •  Docosahexaenoic Acid (PreNatal DHA) 200 MG CAPS, , Disp: , Rfl:   •  Fexofenadine HCl (ALLEGRA PO), Take by mouth as needed (Patient not taking: Reported on 11/24/2021 ), Disp: , Rfl:   •  ibuprofen (MOTRIN) 600 mg tablet, Take 1 tablet (600 mg total) by mouth every 6 (six) hours, Disp: 30 tablet, Rfl: 0      Review of Systems  Constitutional :no fever, feels well, no tiredness, no recent weight gain or loss  ENT: no ear ache, no loss of hearing, no nosebleeds or nasal discharge, no sore throat or hoarseness  Cardiovascular: no complaints of slow or fast heart beat, no chest pain, no palpitations, no leg claudication or lower extremity edema  Respiratory: no complaints of shortness of shortness of breath, no FISHER  Breasts:no complaints of breast pain, breast lump, or nipple discharge  Gastrointestinal: no complaints of abdominal pain, constipation, nausea, vomiting, or diarrhea or bloody stools  Genitourinary : no complaints of dysuria, incontinence, pelvic pain, no dysmenorrhea, vaginal discharge or abnormal vaginal bleeding and as noted in HPI  Musculoskeletal: no complaints of arthralgia, no myalgia, no joint swelling or stiffness, no limb pain or swelling    Integumentary: no complaints of skin rash or lesion, itching or dry skin  Neurological: no complaints of headache, no confusion, no numbness or tingling, no dizziness or fainting    Objective      /60   Ht 5' 6" (1 676 m)   Wt 70 5 kg (155 lb 6 4 oz)   LMP 09/27/2022 (Exact Date)   BMI 25 08 kg/m²   General:   appears stated age, cooperative, alert normal mood and affect   Lungs: Unlabored breathing     Breasts: normal appearance, no masses or tenderness   Abdomen: soft, non-tender, without masses or organomegaly   Vulva: normal female genitalia   Vagina: normal vagina, no discharge, exudate, lesion, or erythema   Urethra: normal   Cervix: Normal, no discharge  Nontender     Uterus: normal size, contour, position, consistency, mobility, non-tender   Adnexa: no mass, fullness, tenderness   Psychiatric orientation to person, place, and time: normal  mood and affect: normal

## 2023-10-24 ENCOUNTER — ANNUAL EXAM (OUTPATIENT)
Dept: OBGYN CLINIC | Facility: MEDICAL CENTER | Age: 33
End: 2023-10-24
Payer: COMMERCIAL

## 2023-10-24 VITALS
WEIGHT: 148 LBS | SYSTOLIC BLOOD PRESSURE: 120 MMHG | DIASTOLIC BLOOD PRESSURE: 70 MMHG | BODY MASS INDEX: 23.78 KG/M2 | HEIGHT: 66 IN

## 2023-10-24 DIAGNOSIS — N92.1 IRREGULAR INTERMENSTRUAL BLEEDING: ICD-10-CM

## 2023-10-24 DIAGNOSIS — Z01.419 ENCOUNTER FOR WELL WOMAN EXAM WITH ROUTINE GYNECOLOGICAL EXAM: Primary | ICD-10-CM

## 2023-10-24 PROCEDURE — S0612 ANNUAL GYNECOLOGICAL EXAMINA: HCPCS | Performed by: OBSTETRICS & GYNECOLOGY

## 2023-10-24 PROCEDURE — G0476 HPV COMBO ASSAY CA SCREEN: HCPCS | Performed by: OBSTETRICS & GYNECOLOGY

## 2023-10-24 PROCEDURE — G0145 SCR C/V CYTO,THINLAYER,RESCR: HCPCS | Performed by: OBSTETRICS & GYNECOLOGY

## 2023-10-24 RX ORDER — VIT C/B6/B5/MAGNESIUM/HERB 173 50-5-6-5MG
CAPSULE ORAL
COMMUNITY

## 2023-10-24 NOTE — PROGRESS NOTES
ASSESSMENT & PLAN: Jenifer Mohs is a 26 y.o. [id number] with normal gynecologic exam.    1.  Routine well woman exam done today  2. Pap and HPV:  The patient's last pap and hpv was . It was normal.    Pap and cotesting was done today. Current ASCCP Guidelines reviewed. 3.  The following were reviewed in today's visit: breast self exam, exercise, and healthy diet. 4. 2 days of spotting on day 7 and 10 post her cycle  - US ordered      CC:  Annual Gynecologic Examination    HPI: Jenifer Mohs is a 26 y.o. [id number] who presents for annual gynecologic examination. She has the following concerns:  heavy regular cycles  but some spotting on day  7and  10  for over one year      Health Maintenance:    She wears her seatbelt routinely. She does perform regular monthly self breast exams. She feels safe at home. Past Medical History:   Diagnosis Date    Abnormal Pap smear of cervix     Anemia     age 13 took iron supplements x6 months    Asthma     as a child    Female infertility     Gluten intolerance     Migraine     age 13     Miscarriage -    Spontaneous      Last assessed: 16    Varicella        Past Surgical History:   Procedure Laterality Date     SECTION      MOUTH SURGERY      age 11    [place]  DELIVERY ONLY N/A 2017    Procedure:  SECTION ();   Surgeon: Brianna French MD;  Location: [facility];  Service: Obstetrics    [place]  DELIVERY ONLY N/A 2021    Procedure: [address] () REPEAT;  Surgeon: Bruce Baca MD;  Location: [facility];  Service: Obstetrics    SHOULDER SURGERY      age 23    WISDOM TOOTH EXTRACTION         Past OB/Gyn History:  OB History          3    Para   2    Term   2       0    AB   1    Living   2         SAB   1    IAB   0    Ectopic   0    Multiple   0    Live Births   2                   Family History   Problem Relation Age of Onset    Cancer Mother         Oral Hypertension Mother         essential    Thyroid disease Mother     Hypothyroidism Father     Hypertension Father     Thyroid disease Father     Hypertension Sister     Migraines Sister         headaches    Cancer Maternal Grandmother         Breast    Hypertension Maternal Grandmother         essential    Breast cancer Maternal Grandmother         diagnosed in 66's    Thyroid disease Maternal Grandmother     Diabetes Maternal Grandfather     Hypertension Maternal Grandfather         essential    Lung cancer Maternal Grandfather     Cancer Maternal Grandfather         Lung    Cancer Paternal Grandmother         Uterine    Uterine cancer Paternal Grandmother         diagnosed in 66's    Hypertension Sister     Colon cancer Neg Hx        Social History:  Social History     Socioeconomic History    Marital status: /Civil Union     Spouse name: Not on file    Number of children: Not on file    Years of education: Not on file    Highest education level: Not on file   Occupational History    Not on file   Tobacco Use    Smoking status: Never    Smokeless tobacco: Never   Vaping Use    Vaping Use: Never used   Substance and Sexual Activity    Alcohol use: Not Currently     Comment: socially/prior to pregnancy only    Drug use: No    Sexual activity: Yes     Partners: Male     Birth control/protection: Male Sterilization   Other Topics Concern    Not on file   Social History Narrative    Attempting to conceive     Social Determinants of Health     Financial Resource Strain: Not on file   Food Insecurity: Not on file   Transportation Needs: Not on file   Physical Activity: Not on file   Stress: Not on file   Social Connections: Not on file   Intimate Partner Violence: Not on file   Housing Stability: Not on file       Allergies   Allergen Reactions    Gluten Meal - Food Allergy Rash    Penicillins Rash    Sulfa Antibiotics Rash         Current Outpatient Medications:     Ascorbic Acid (vitamin C) 100 MG tablet, Take 100 mg by mouth daily, Disp: , Rfl:     Cholecalciferol (Vitamin D) 50 MCG (2000 UT) tablet, , Disp: , Rfl:     fexofenadine (ALLEGRA) 180 MG tablet, , Disp: , Rfl:     Turmeric 500 MG CAPS, , Disp: , Rfl:     acetaminophen (TYLENOL) 325 mg tablet, Take 3 tablets (975 mg total) by mouth every 6 (six) hours, Disp: , Rfl: 0    Docosahexaenoic Acid (PreNatal DHA) 200 MG CAPS, , Disp: , Rfl:     Fexofenadine HCl (ALLEGRA PO), Take by mouth as needed (Patient not taking: Reported on 11/24/2021 ), Disp: , Rfl:     ibuprofen (MOTRIN) 600 mg tablet, Take 1 tablet (600 mg total) by mouth every 6 (six) hours, Disp: 30 tablet, Rfl: 0      Review of Systems  Constitutional :no fever, feels well, no tiredness, no recent weight gain or loss  ENT: no ear ache, no loss of hearing, no nosebleeds or nasal discharge, no sore throat or hoarseness. Cardiovascular: no complaints of slow or fast heart beat, no chest pain, no palpitations, no leg claudication or lower extremity edema. Respiratory: no complaints of shortness of shortness of breath, no FISHER  Breasts:no complaints of breast pain, breast lump, or nipple discharge  Gastrointestinal: no complaints of abdominal pain, constipation, nausea, vomiting, or diarrhea or bloody stools  Genitourinary : no complaints of dysuria, incontinence, pelvic pain, no dysmenorrhea, vaginal discharge or abnormal vaginal bleeding and as noted in HPI. Musculoskeletal: no complaints of arthralgia, no myalgia, no joint swelling or stiffness, no limb pain or swelling.   Integumentary: no complaints of skin rash or lesion, itching or dry skin  Neurological: no complaints of headache, no confusion, no numbness or tingling, no dizziness or fainting    Objective      /70   Ht 5' 6" (1.676 m)   Wt 67.1 kg (148 lb)   LMP 10/10/2023 (Exact Date)   BMI 23.89 kg/m²   General:   appears stated age, cooperative, alert normal mood and affect   Lungs: Unlabored breathing    Breasts: normal appearance, no masses or tenderness   Abdomen: soft, non-tender, without masses or organomegaly   Vulva: normal, normal female genitalia, Bartholin's, Urethra, Renovo normal, no lesions, normal female hair distribution, no clitoral enlargement   Vagina: normal vagina, no discharge, exudate, lesion, or erythema   Urethra: normal   Cervix: Normal, no discharge. Nontender.    Uterus: normal size, contour, position, consistency, mobility, non-tender   Adnexa: no mass, fullness, tenderness   Psychiatric orientation to person, place, and time: normal. mood and affect: normal

## 2023-10-25 LAB
HPV HR 12 DNA CVX QL NAA+PROBE: NEGATIVE
HPV16 DNA CVX QL NAA+PROBE: NEGATIVE
HPV18 DNA CVX QL NAA+PROBE: NEGATIVE

## 2023-10-30 LAB
LAB AP GYN PRIMARY INTERPRETATION: NORMAL
Lab: NORMAL

## 2023-11-16 ENCOUNTER — HOSPITAL ENCOUNTER (OUTPATIENT)
Dept: ULTRASOUND IMAGING | Facility: MEDICAL CENTER | Age: 33
Discharge: HOME | End: 2023-11-16
Payer: COMMERCIAL

## 2023-11-16 DIAGNOSIS — N92.1 IRREGULAR INTERMENSTRUAL BLEEDING: ICD-10-CM

## 2023-11-16 PROCEDURE — 76856 US EXAM PELVIC COMPLETE: CPT

## 2023-11-16 PROCEDURE — 76830 TRANSVAGINAL US NON-OB: CPT

## 2023-11-22 ENCOUNTER — TELEPHONE (OUTPATIENT)
Dept: OBGYN CLINIC | Facility: MEDICAL CENTER | Age: 33
End: 2023-11-22

## 2023-11-22 NOTE — TELEPHONE ENCOUNTER
Advised patient that provider has not been able to review ultrasound results as of this morning. After they are reviewed by the provider we will reach out to her with recommendations.

## 2023-11-23 NOTE — RESULT ENCOUNTER NOTE
Please inform patient I reviewed her US possibly showing a polyp   This could be reason for her heavy bleeding  - recommend scheduling follow up with me to discuss management thanks

## 2024-01-04 ENCOUNTER — OFFICE VISIT (OUTPATIENT)
Dept: OBGYN CLINIC | Facility: CLINIC | Age: 34
End: 2024-01-04
Payer: COMMERCIAL

## 2024-01-04 VITALS
DIASTOLIC BLOOD PRESSURE: 80 MMHG | HEIGHT: 66 IN | WEIGHT: 150 LBS | BODY MASS INDEX: 24.11 KG/M2 | SYSTOLIC BLOOD PRESSURE: 124 MMHG

## 2024-01-04 DIAGNOSIS — N84.0 ENDOMETRIAL POLYP: ICD-10-CM

## 2024-01-04 DIAGNOSIS — N93.9 ABNORMAL UTERINE BLEEDING (AUB): Primary | ICD-10-CM

## 2024-01-04 PROCEDURE — 99213 OFFICE O/P EST LOW 20 MIN: CPT | Performed by: OBSTETRICS & GYNECOLOGY

## 2024-01-04 NOTE — PROGRESS NOTES
Assessment Elizabeth was seen today for follow-up.    Diagnoses and all orders for this visit:    Abnormal uterine bleeding (AUB)    Endometrial polyp         Plan  Today we reviewed US showing possible endometrial polyp   We discussed possible management options   We discussed that hysteroscopy D&C with polypectomy is both diagnostic and therapeutic vs a sonohysterography  All questions answered   Interested in scheduling surgery    Subjective   Elizabeth Bull is a 33 y.o. female here for a problem visit.  Patient is complaining of having vaginal bleeding for 2 weeks out of the month. Bleeding seems to initiate with ovulation and then has a normal period followed by spotting for about  14 days of the month . .  Patient Active Problem List   Diagnosis    39 weeks gestation of pregnancy    Maternal care due to low transverse uterine scar from previous  delivery    Family history of congenital cardiac septal defect    Hereditary disease in family possibly affecting fetus    Previous child born with ventricular septal defect, currently pregnant, antepartum    Gluten intolerance    38 weeks gestation of pregnancy    Status post repeat low transverse  section       Gynecologic History  Patient's last menstrual period was 2024 (exact date).      Past Medical History:   Diagnosis Date    Abnormal Pap smear of cervix     Anemia     age 12 took iron supplements x6 months    Asthma     as a child    Female infertility     Gluten intolerance     Migraine     age 11     Miscarriage 6-    Spontaneous      Last assessed: 16    Varicella      Past Surgical History:   Procedure Laterality Date     SECTION      MOUTH SURGERY      age 13    [place] N/A 2017    Procedure:  SECTION ();  Surgeon: Sarah Bethea MD;  Location: [facility];  Service: Obstetrics    [place]  DEL[place] N/A 2021    Procedure:  SECTION ()  REPEAT;  Surgeon: Nicole Donahue MD;  Location: [facility];  Service: Obstetrics    SHOULDER SURGERY      age 16    WISDOM TOOTH EXTRACTION       Family History   Problem Relation Age of Onset    Cancer Mother         Oral    Hypertension Mother         essential    Thyroid disease Mother     Hypothyroidism Father     Hypertension Father     Thyroid disease Father     Hypertension Sister     Migraines Sister         headaches    Cancer Maternal Grandmother         Breast    Hypertension Maternal Grandmother         essential    Breast cancer Maternal Grandmother         diagnosed in 70's    Thyroid disease Maternal Grandmother     Diabetes Maternal Grandfather     Hypertension Maternal Grandfather         essential    Lung cancer Maternal Grandfather     Cancer Maternal Grandfather         Lung    Cancer Paternal Grandmother         Uterine    Uterine cancer Paternal Grandmother         diagnosed in 70's    Hypertension Sister     Colon cancer Neg Hx      Social History     Socioeconomic History    Marital status: /Civil Union     Spouse name: Not on file    Number of children: Not on file    Years of education: Not on file    Highest education level: Not on file   Occupational History    Not on file   Tobacco Use    Smoking status: Never    Smokeless tobacco: Never   Vaping Use    Vaping status: Never Used   Substance and Sexual Activity    Alcohol use: Not Currently     Comment: socially/prior to pregnancy only    Drug use: No    Sexual activity: Yes     Partners: Male     Birth control/protection: Male Sterilization   Other Topics Concern    Not on file   Social History Narrative    Attempting to conceive     Social Determinants of Health     Financial Resource Strain: Not on file   Food Insecurity: Not on file   Transportation Needs: Not on file   Physical Activity: Not on file   Stress: Not on file   Social Connections: Not on file   Intimate Partner Violence: Not on file   Housing Stability: Not on  "file     Allergies   Allergen Reactions    Gluten Meal - Food Allergy Rash    Penicillins Rash    Sulfa Antibiotics Rash       Current Outpatient Medications:     Ascorbic Acid (vitamin C) 100 MG tablet, Take 100 mg by mouth daily, Disp: , Rfl:     Cholecalciferol (Vitamin D) 50 MCG (2000 UT) tablet, , Disp: , Rfl:     Turmeric 500 MG CAPS, , Disp: , Rfl:     acetaminophen (TYLENOL) 325 mg tablet, Take 3 tablets (975 mg total) by mouth every 6 (six) hours, Disp: , Rfl: 0    Docosahexaenoic Acid (PreNatal DHA) 200 MG CAPS, , Disp: , Rfl:     fexofenadine (ALLEGRA) 180 MG tablet, , Disp: , Rfl:     Fexofenadine HCl (ALLEGRA PO), Take by mouth as needed (Patient not taking: Reported on 11/24/2021 ), Disp: , Rfl:     ibuprofen (MOTRIN) 600 mg tablet, Take 1 tablet (600 mg total) by mouth every 6 (six) hours, Disp: 30 tablet, Rfl: 0    Review of Systems  Constitutional :no fever, feels well, no tiredness, no recent weight gain or loss  ENT: no ear ache, no loss of hearing, no nosebleeds or nasal discharge, no sore throat or hoarseness.  Cardiovascular: no complaints of slow or fast heart beat, no chest pain, no palpitations, no leg claudication or lower extremity edema.  Respiratory: no complaints of shortness of shortness of breath, no FISHER  Breasts:no complaints of breast pain, breast lump, or nipple discharge  Gastrointestinal: no complaints of abdominal pain, constipation, nausea, vomiting, or diarrhea or bloody stools  Genitourinary : no complaints of dysuria, incontinence, pelvic pain, no dysmenorrhea, vaginal discharge or abnormal vaginal bleeding and as noted in HPI.  Musculoskeletal: no complaints of arthralgia, no myalgia, no joint swelling or stiffness, no limb pain or swelling.  Integumentary: no complaints of skin rash or lesion, itching or dry skin  Neurological: no complaints of headache, no confusion, no numbness or tingling, no dizziness or fainting     Objective     /80   Ht 5' 6\" (1.676 m)   Wt " 68 kg (150 lb)   LMP 01/03/2024 (Exact Date)   BMI 24.21 kg/m²     General:   appears stated age, cooperative, alert normal mood and affect   Skin normal skin turgor and no rashes.   Psychiatric orientation to person, place, and time: normal. mood and affect: normal    UTERUS:  The uterus is anteverted in position, measuring 10.1 x 4.1 x 4.8 cm.  The uterus has a normal contour and echotexture. No focal uterine mass identified.  The cervix appears within normal limits.     ENDOMETRIUM:  The endometrial echo complex has an AP caliber of 7.2 mm.  Trace endometrial fluid noted. A 1.3 x 0.5 x 0.9 cm mildly isoechoic nodule within the endometrial canal is seen with internal vascularity on Doppler imaging and feeder vessel. This could represent an endometrial polyp, recommend follow-up gynecology   evaluation. Consider sonohysterogram for better evaluation if clinically indicated.     OVARIES/ADNEXA:  Right ovary:  2.7 x 2.0 x 1.7 cm. 4.8 mL.  Left ovary:  4.4 x 2.9 x 1.8 cm. 12.2 mL.  Ovarian Doppler flow is within normal limits.  No suspicious ovarian or adnexal abnormality.     OTHER:  No free fluid or loculated fluid collections.        IMPRESSION:     1.3 x 0.5 x 0.9 cm endometrial lesion which could represent polyp as described above. Recommend follow-up gynecology evaluation. Consider sonohysterogram for better evaluation if clinically needed.

## 2024-01-17 ENCOUNTER — TELEPHONE (OUTPATIENT)
Dept: GYNECOLOGY | Facility: CLINIC | Age: 34
End: 2024-01-17

## 2024-01-17 NOTE — TELEPHONE ENCOUNTER
----- Message from Nicole Donahue MD sent at 1/4/2024  1:23 PM EST -----  Regarding: surgery  Baljinder   Can this patient be scheduled with me for   Hysteroscopy , D&C polypectomy   Thanks   Would like to ge this done in next 3 months is possible thanks

## 2024-03-07 ENCOUNTER — TELEPHONE (OUTPATIENT)
Dept: GYNECOLOGY | Facility: CLINIC | Age: 34
End: 2024-03-07

## 2024-03-13 ENCOUNTER — TELEPHONE (OUTPATIENT)
Dept: GYNECOLOGY | Facility: CLINIC | Age: 34
End: 2024-03-13

## 2024-03-14 ENCOUNTER — TELEPHONE (OUTPATIENT)
Dept: GYNECOLOGY | Facility: CLINIC | Age: 34
End: 2024-03-14

## 2024-03-14 ENCOUNTER — CONSULT (OUTPATIENT)
Dept: OBGYN CLINIC | Facility: CLINIC | Age: 34
End: 2024-03-14
Payer: COMMERCIAL

## 2024-03-14 VITALS
BODY MASS INDEX: 23.75 KG/M2 | HEIGHT: 66 IN | WEIGHT: 147.8 LBS | SYSTOLIC BLOOD PRESSURE: 120 MMHG | DIASTOLIC BLOOD PRESSURE: 70 MMHG

## 2024-03-14 DIAGNOSIS — N84.0 ENDOMETRIAL POLYP: ICD-10-CM

## 2024-03-14 DIAGNOSIS — N92.0 MENORRHAGIA WITH REGULAR CYCLE: Primary | ICD-10-CM

## 2024-03-14 PROCEDURE — 99214 OFFICE O/P EST MOD 30 MIN: CPT | Performed by: OBSTETRICS & GYNECOLOGY

## 2024-03-14 NOTE — PROGRESS NOTES
Assessment Elizabeth was seen today for pre-op exam.    Diagnoses and all orders for this visit:    Menorrhagia with regular cycle    Endometrial polyp         Plan  Today we reviewed US showing possible endometrial polyp   We discussed possible management options   We discussed that hysteroscopy D&C with polypectomy is both diagnostic and therapeutic vs a sonohysterography  All questions answered     Patient is interested in definitive management in the form of a hysteroscopy D&C polypectomy.  We discussed the risk of surgery including but not limited to infection, bleeding, uterine perforation with possible results taking injury to surrounding organs such as the bowel, bladder, major blood vessels among others needing further surgery   Subjective   Elizabeth Bull is a 33 y.o. female here for a problem visit.  Patient is complaining of having vaginal bleeding for 2 weeks out of the month. Bleeding seems to initiate with ovulation and then has a normal period followed by spotting for about  14 days of the month , this has been going on for several months now .     Patient Active Problem List   Diagnosis    39 weeks gestation of pregnancy    Maternal care due to low transverse uterine scar from previous  delivery    Family history of congenital cardiac septal defect    Hereditary disease in family possibly affecting fetus    Previous child born with ventricular septal defect, currently pregnant, antepartum    Gluten intolerance    38 weeks gestation of pregnancy    Status post repeat low transverse  section       Gynecologic History  Patient's last menstrual period was 2024 (exact date).      Past Medical History:   Diagnosis Date    Abnormal Pap smear of cervix     Anemia     age 12 took iron supplements x6 months    Asthma     as a child    Female infertility     Gluten intolerance     Migraine     age 11     Miscarriage -    Spontaneous      Last assessed: 16     Varicella      Past Surgical History:   Procedure Laterality Date     SECTION      MOUTH SURGERY      age 13    [place]  DELIVERY ONLY N/A 2017    Procedure:  SECTION ();  Surgeon: Sarah Bethea MD;  Location: [facility];  Service: Obstetrics    [place]  DEL[place]ERY ONLY N/A 2021    Procedure:  SECTION () REPEAT;  Surgeon: Nicole Donahue MD;  Location: [facility];  Service: Obstetrics    SHOULDER SURGERY      age 16    WISDOM TOOTH EXTRACTION       Family History   Problem Relation Age of Onset    Cancer Mother         Oral    Hypertension Mother         essential    Thyroid disease Mother     Hypothyroidism Father     Hypertension Father     Thyroid disease Father     Hypertension Sister     Migraines Sister         headaches    Cancer Maternal Grandmother         Breast    Hypertension Maternal Grandmother         essential    Breast cancer Maternal Grandmother         diagnosed in 70s    Thyroid disease Maternal Grandmother     Diabetes Maternal Grandfather     Hypertension Maternal Grandfather         essential    Lung cancer Maternal Grandfather     Cancer Maternal Grandfather         Lung    Cancer Paternal Grandmother         Uterine    Uterine cancer Paternal Grandmother         diagnosed in 70's    Hypertension Sister     Colon cancer Neg Hx      Social History     Socioeconomic History    Marital status: /Civil Union     Spouse name: Not on file    Number of children: Not on file    Years of education: Not on file    Highest education level: Not on file   Occupational History    Not on file   Tobacco Use    Smoking status: Never    Smokeless tobacco: Never   Vaping Use    Vaping status: Never Used   Substance and Sexual Activity    Alcohol use: Not Currently     Comment: socially/prior to pregnancy only    Drug use: No    Sexual activity: Yes     Partners: Male     Birth control/protection: Male Sterilization   Other Topics Concern    Not on  file   Social History Narrative    Attempting to conceive     Social Determinants of Health     Financial Resource Strain: Not on file   Food Insecurity: Not on file   Transportation Needs: Not on file   Physical Activity: Not on file   Stress: Not on file   Social Connections: Not on file   Intimate Partner Violence: Not on file   Housing Stability: Not on file     Allergies   Allergen Reactions    Gluten Meal - Food Allergy Rash    Penicillins Rash    Sulfa Antibiotics Rash       Current Outpatient Medications:     Ascorbic Acid (vitamin C) 100 MG tablet, Take 100 mg by mouth daily, Disp: , Rfl:     Cholecalciferol (Vitamin D) 50 MCG (2000 UT) tablet, , Disp: , Rfl:     Turmeric 500 MG CAPS, , Disp: , Rfl:     acetaminophen (TYLENOL) 325 mg tablet, Take 3 tablets (975 mg total) by mouth every 6 (six) hours, Disp: , Rfl: 0    Docosahexaenoic Acid (PreNatal DHA) 200 MG CAPS, , Disp: , Rfl:     fexofenadine (ALLEGRA) 180 MG tablet, , Disp: , Rfl:     Fexofenadine HCl (ALLEGRA PO), Take by mouth as needed (Patient not taking: Reported on 11/24/2021 ), Disp: , Rfl:     ibuprofen (MOTRIN) 600 mg tablet, Take 1 tablet (600 mg total) by mouth every 6 (six) hours, Disp: 30 tablet, Rfl: 0    Review of Systems  Constitutional :no fever, feels well, no tiredness, no recent weight gain or loss  ENT: no ear ache, no loss of hearing, no nosebleeds or nasal discharge, no sore throat or hoarseness.  Cardiovascular: no complaints of slow or fast heart beat, no chest pain, no palpitations, no leg claudication or lower extremity edema.  Respiratory: no complaints of shortness of shortness of breath, no FISHER  Breasts:no complaints of breast pain, breast lump, or nipple discharge  Gastrointestinal: no complaints of abdominal pain, constipation, nausea, vomiting, or diarrhea or bloody stools  Genitourinary :  as noted in HPI.  Musculoskeletal: no complaints of arthralgia, no myalgia, no joint swelling or stiffness, no limb pain or  "swelling.  Integumentary: no complaints of skin rash or lesion, itching or dry skin  Neurological: no complaints of headache, no confusion, no numbness or tingling, no dizziness or fainting     Objective     /70   Ht 5' 6\" (1.676 m)   Wt 67 kg (147 lb 12.8 oz)   LMP 02/26/2024 (Exact Date)   BMI 23.86 kg/m²     General:   appears stated age, cooperative, alert normal mood and affect   Heart: regular rate and rhythm, S1, S2 normal, no murmur, click, rub or gallop   Lungs: clear to auscultation bilaterally   Abdomen: soft, non-tender, without masses or organomegaly   Vulva: normal, normal female genitalia, Bartholin's, Urethra, [place] normal, no lesions, normal female hair distribution, no clitoral enlargement   Vagina: normal vagina, no discharge, exudate, lesion, or erythema   Urethra: normal   Cervix: Normal, no discharge. Nontender.   Uterus: normal size, contour, position, consistency, mobility, non-tender   Adnexa: no mass, fullness, tenderness   Lymphatic palpation of lymph nodes in neck, axilla, groin and/or other locations: no lymphadenopathy or masses noted   Skin normal skin turgor and no rashes.   Psychiatric orientation to person, place, and time: normal. mood and affect: normal    UTERUS:  The uterus is anteverted in position, measuring 10.1 x 4.1 x 4.8 cm.  The uterus has a normal contour and echotexture. No focal uterine mass identified.  The cervix appears within normal limits.     ENDOMETRIUM:  The endometrial echo complex has an AP caliber of 7.2 mm.  Trace endometrial fluid noted. A 1.3 x 0.5 x 0.9 cm mildly isoechoic nodule within the endometrial canal is seen with internal vascularity on Doppler imaging and feeder vessel. This could represent an endometrial polyp, recommend follow-up gynecology   evaluation. Consider sonohysterogram for better evaluation if clinically indicated.     OVARIES/ADNEXA:  Right ovary:  2.7 x 2.0 x 1.7 cm. 4.8 mL.  Left ovary:  4.4 x 2.9 x 1.8 cm. 12.2 " mL.  Ovarian Doppler flow is within normal limits.  No suspicious ovarian or adnexal abnormality.     OTHER:  No free fluid or loculated fluid collections.        IMPRESSION:     1.3 x 0.5 x 0.9 cm endometrial lesion which could represent polyp as described above. Recommend follow-up gynecology evaluation. Consider sonohysterogram for better evaluation if clinically needed.

## 2024-03-18 NOTE — PRE-PROCEDURE INSTRUCTIONS
Pre-Surgery Instructions:   Medication Instructions    Ascorbic Acid (vitamin C) 100 MG tablet Stop taking 7 days prior to surgery.    Cholecalciferol (Vitamin D) 50 MCG (2000 UT) tablet Stop taking 7 days prior to surgery.    fexofenadine (ALLEGRA) 180 MG tablet Stop taking 7 days prior to surgery.    Turmeric 500 MG CAPS Stop taking 7 days prior to surgery.     Spoke with pt via phone.    Medication instructions for day surgery reviewed. Please use only a sip of water to take your instructed medications. Avoid all over the counter vitamins, supplements and NSAIDS for one week prior to surgery per anesthesia guidelines. Tylenol is ok to take as needed.     You will receive a call one business day prior to surgery with an arrival time and hospital directions. If your surgery is scheduled on a Monday, the hospital will be calling you on the Friday prior to your surgery. If you have not heard from anyone by 8pm, please call the hospital supervisor through the hospital  at [phone]. ([place] [phone] or [place] [phone]).    Do not eat or drink anything after midnight the night before your surgery, including candy, mints, lifesavers, or chewing gum. Do not drink alcohol 24hrs before your surgery. Try not to smoke at least 24hrs before your surgery.       Follow the pre surgery showering instructions as listed in the “My Surgical Experience Booklet” or otherwise provided by your surgeon's office. Do not use a blade to shave the surgical area 1 week before surgery. It is okay to use a clean electric clippers up to 24 hours before surgery. Do not apply any lotions, creams, including makeup, cologne, deodorant, or perfumes after showering on the day of your surgery. Do not use dry shampoo, hair spray, hair gel, or any type of hair products.     No contact lenses, eye make-up, or artificial eyelashes. Remove nail polish, including gel polish, and any artificial, gel, or acrylic nails if possible.  Remove all jewelry including rings and body piercing jewelry.     Wear causal clothing that is easy to take on and off. Consider your type of surgery.    Keep any valuables, jewelry, piercings at home. Please bring any specially ordered equipment (sling, braces) if indicated.    Arrange for a responsible person to drive you to and from the hospital on the day of your surgery. Please confirm the visitor policy for the day of your procedure when you receive your phone call with an arrival time.     Call the surgeon's office with any new illnesses, exposures, or additional questions prior to surgery.    Please reference your “My Surgical Experience Booklet” for additional information to prepare for your upcoming surgery.

## 2024-03-20 ENCOUNTER — APPOINTMENT (OUTPATIENT)
Dept: LAB | Facility: MEDICAL CENTER | Age: 34
End: 2024-03-20
Payer: COMMERCIAL

## 2024-03-20 DIAGNOSIS — Z01.818 PREOP TESTING: ICD-10-CM

## 2024-03-20 LAB
ANION GAP SERPL CALCULATED.3IONS-SCNC: 9 MMOL/L (ref 4–13)
BUN SERPL-MCNC: 12 MG/DL (ref 5–25)
CALCIUM SERPL-MCNC: 9 MG/DL (ref 8.4–10.2)
CHLORIDE SERPL-SCNC: 106 MMOL/L (ref 96–108)
CO2 SERPL-SCNC: 26 MMOL/L (ref 21–32)
CREAT SERPL-MCNC: 0.64 MG/DL (ref 0.6–1.3)
ERYTHROCYTE [DISTWIDTH] IN BLOOD BY AUTOMATED COUNT: 12 % (ref 11.6–15.1)
GFR SERPL CREATININE-BSD FRML MDRD: 115 ML/MIN/1.73SQ M
GLUCOSE P FAST SERPL-MCNC: 93 MG/DL (ref 65–99)
HCT VFR BLD AUTO: 42.2 % (ref 34.8–46.1)
HGB BLD-MCNC: 13.8 G/DL (ref 11.5–15.4)
MCH RBC QN AUTO: 30.7 PG (ref 26.8–34.3)
MCHC RBC AUTO-ENTMCNC: 32.7 G/DL (ref 31.4–37.4)
MCV RBC AUTO: 94 FL (ref 82–98)
PLATELET # BLD AUTO: 266 THOUSANDS/UL (ref 149–390)
PMV BLD AUTO: 12.5 FL (ref 8.9–12.7)
POTASSIUM SERPL-SCNC: 4.3 MMOL/L (ref 3.5–5.3)
RBC # BLD AUTO: 4.5 MILLION/UL (ref 3.81–5.12)
SODIUM SERPL-SCNC: 141 MMOL/L (ref 135–147)
WBC # BLD AUTO: 5.82 THOUSAND/UL (ref 4.31–10.16)

## 2024-03-20 PROCEDURE — 80048 BASIC METABOLIC PNL TOTAL CA: CPT

## 2024-03-20 PROCEDURE — 85027 COMPLETE CBC AUTOMATED: CPT

## 2024-03-20 PROCEDURE — 36415 COLL VENOUS BLD VENIPUNCTURE: CPT

## 2024-03-21 ENCOUNTER — TELEPHONE (OUTPATIENT)
Dept: GYNECOLOGY | Facility: CLINIC | Age: 34
End: 2024-03-21

## 2024-03-26 ENCOUNTER — ANESTHESIA EVENT (OUTPATIENT)
Dept: PERIOP | Facility: HOSPITAL | Age: 34
End: 2024-03-26
Payer: COMMERCIAL

## 2024-03-27 ENCOUNTER — HOSPITAL ENCOUNTER (OUTPATIENT)
Facility: HOSPITAL | Age: 34
Setting detail: OUTPATIENT SURGERY
Discharge: HOME | End: 2024-03-27
Attending: OBSTETRICS & GYNECOLOGY | Admitting: OBSTETRICS & GYNECOLOGY
Payer: COMMERCIAL

## 2024-03-27 ENCOUNTER — ANESTHESIA (OUTPATIENT)
Dept: PERIOP | Facility: HOSPITAL | Age: 34
End: 2024-03-27
Payer: COMMERCIAL

## 2024-03-27 VITALS
WEIGHT: 147.05 LBS | HEART RATE: 88 BPM | HEIGHT: 66 IN | SYSTOLIC BLOOD PRESSURE: 116 MMHG | OXYGEN SATURATION: 100 % | BODY MASS INDEX: 23.63 KG/M2 | RESPIRATION RATE: 16 BRPM | DIASTOLIC BLOOD PRESSURE: 70 MMHG | TEMPERATURE: 97.2 F

## 2024-03-27 DIAGNOSIS — N93.9 ABNORMAL UTERINE BLEEDING (AUB): ICD-10-CM

## 2024-03-27 PROBLEM — Z3A.39 39 WEEKS GESTATION OF PREGNANCY: Status: RESOLVED | Noted: 2021-05-25 | Resolved: 2024-03-27

## 2024-03-27 PROBLEM — Z3A.38 38 WEEKS GESTATION OF PREGNANCY: Status: RESOLVED | Noted: 2021-11-18 | Resolved: 2024-03-27

## 2024-03-27 PROBLEM — J45.909 ASTHMA: Status: ACTIVE | Noted: 2024-03-27

## 2024-03-27 PROBLEM — G43.909 MIGRAINE: Status: ACTIVE | Noted: 2024-03-27

## 2024-03-27 LAB
EXT PREGNANCY TEST URINE: NEGATIVE
EXT. CONTROL: NORMAL

## 2024-03-27 PROCEDURE — 58558 HYSTEROSCOPY BIOPSY: CPT | Performed by: OBSTETRICS & GYNECOLOGY

## 2024-03-27 PROCEDURE — 88305 TISSUE EXAM BY PATHOLOGIST: CPT | Performed by: PATHOLOGY

## 2024-03-27 PROCEDURE — 81025 URINE PREGNANCY TEST: CPT | Performed by: OBSTETRICS & GYNECOLOGY

## 2024-03-27 RX ORDER — ONDANSETRON 2 MG/ML
4 INJECTION INTRAMUSCULAR; INTRAVENOUS ONCE AS NEEDED
Status: DISCONTINUED | OUTPATIENT
Start: 2024-03-27 | End: 2024-03-27 | Stop reason: HOSPADM

## 2024-03-27 RX ORDER — ACETAMINOPHEN 325 MG/1
975 TABLET ORAL EVERY 6 HOURS PRN
Status: DISCONTINUED | OUTPATIENT
Start: 2024-03-27 | End: 2024-03-27 | Stop reason: HOSPADM

## 2024-03-27 RX ORDER — ONDANSETRON 2 MG/ML
INJECTION INTRAMUSCULAR; INTRAVENOUS AS NEEDED
Status: DISCONTINUED | OUTPATIENT
Start: 2024-03-27 | End: 2024-03-27

## 2024-03-27 RX ORDER — MIDAZOLAM HYDROCHLORIDE 2 MG/2ML
INJECTION, SOLUTION INTRAMUSCULAR; INTRAVENOUS AS NEEDED
Status: DISCONTINUED | OUTPATIENT
Start: 2024-03-27 | End: 2024-03-27

## 2024-03-27 RX ORDER — SODIUM CHLORIDE 9 MG/ML
125 INJECTION, SOLUTION INTRAVENOUS CONTINUOUS
Status: DISCONTINUED | OUTPATIENT
Start: 2024-03-27 | End: 2024-03-27

## 2024-03-27 RX ORDER — IBUPROFEN 600 MG/1
600 TABLET ORAL EVERY 6 HOURS PRN
Status: DISCONTINUED | OUTPATIENT
Start: 2024-03-27 | End: 2024-03-27 | Stop reason: HOSPADM

## 2024-03-27 RX ORDER — LIDOCAINE HYDROCHLORIDE 20 MG/ML
INJECTION, SOLUTION EPIDURAL; INFILTRATION; INTRACAUDAL; PERINEURAL AS NEEDED
Status: DISCONTINUED | OUTPATIENT
Start: 2024-03-27 | End: 2024-03-27

## 2024-03-27 RX ORDER — PROMETHAZINE HYDROCHLORIDE 25 MG/ML
6.25 INJECTION, SOLUTION INTRAMUSCULAR; INTRAVENOUS ONCE AS NEEDED
Status: DISCONTINUED | OUTPATIENT
Start: 2024-03-27 | End: 2024-03-27 | Stop reason: HOSPADM

## 2024-03-27 RX ORDER — SODIUM CHLORIDE 9 MG/ML
INJECTION, SOLUTION INTRAVENOUS AS NEEDED
Status: DISCONTINUED | OUTPATIENT
Start: 2024-03-27 | End: 2024-03-27 | Stop reason: HOSPADM

## 2024-03-27 RX ORDER — MEPERIDINE HYDROCHLORIDE 25 MG/ML
12.5 INJECTION INTRAMUSCULAR; INTRAVENOUS; SUBCUTANEOUS ONCE AS NEEDED
Status: DISCONTINUED | OUTPATIENT
Start: 2024-03-27 | End: 2024-03-27 | Stop reason: HOSPADM

## 2024-03-27 RX ORDER — PROPOFOL 10 MG/ML
INJECTION, EMULSION INTRAVENOUS AS NEEDED
Status: DISCONTINUED | OUTPATIENT
Start: 2024-03-27 | End: 2024-03-27

## 2024-03-27 RX ORDER — ONDANSETRON 2 MG/ML
4 INJECTION INTRAMUSCULAR; INTRAVENOUS EVERY 6 HOURS PRN
Status: DISCONTINUED | OUTPATIENT
Start: 2024-03-27 | End: 2024-03-27 | Stop reason: HOSPADM

## 2024-03-27 RX ORDER — HYDROMORPHONE HCL/PF 1 MG/ML
0.5 SYRINGE (ML) INJECTION
Status: DISCONTINUED | OUTPATIENT
Start: 2024-03-27 | End: 2024-03-27 | Stop reason: HOSPADM

## 2024-03-27 RX ORDER — KETOROLAC TROMETHAMINE 30 MG/ML
INJECTION, SOLUTION INTRAMUSCULAR; INTRAVENOUS AS NEEDED
Status: DISCONTINUED | OUTPATIENT
Start: 2024-03-27 | End: 2024-03-27

## 2024-03-27 RX ORDER — FENTANYL CITRATE/PF 50 MCG/ML
50 SYRINGE (ML) INJECTION
Status: DISCONTINUED | OUTPATIENT
Start: 2024-03-27 | End: 2024-03-27 | Stop reason: HOSPADM

## 2024-03-27 RX ORDER — FENTANYL CITRATE 50 UG/ML
INJECTION, SOLUTION INTRAMUSCULAR; INTRAVENOUS AS NEEDED
Status: DISCONTINUED | OUTPATIENT
Start: 2024-03-27 | End: 2024-03-27

## 2024-03-27 RX ORDER — DEXAMETHASONE SODIUM PHOSPHATE 10 MG/ML
INJECTION, SOLUTION INTRAMUSCULAR; INTRAVENOUS AS NEEDED
Status: DISCONTINUED | OUTPATIENT
Start: 2024-03-27 | End: 2024-03-27

## 2024-03-27 RX ADMIN — FENTANYL CITRATE 100 MCG: 50 INJECTION INTRAMUSCULAR; INTRAVENOUS at 12:41

## 2024-03-27 RX ADMIN — SODIUM CHLORIDE 125 ML/HR: 0.9 INJECTION, SOLUTION INTRAVENOUS at 11:27

## 2024-03-27 RX ADMIN — ONDANSETRON 4 MG: 2 INJECTION INTRAMUSCULAR; INTRAVENOUS at 12:45

## 2024-03-27 RX ADMIN — IBUPROFEN 600 MG: 600 TABLET, FILM COATED ORAL at 14:09

## 2024-03-27 RX ADMIN — DEXAMETHASONE SODIUM PHOSPHATE 10 MG: 10 INJECTION INTRAMUSCULAR; INTRAVENOUS at 12:45

## 2024-03-27 RX ADMIN — MIDAZOLAM 2 MG: 1 INJECTION INTRAMUSCULAR; INTRAVENOUS at 12:30

## 2024-03-27 RX ADMIN — LIDOCAINE HYDROCHLORIDE 40 MG: 20 INJECTION, SOLUTION EPIDURAL; INFILTRATION; INTRACAUDAL at 12:40

## 2024-03-27 RX ADMIN — PROPOFOL 200 MG: 10 INJECTION, EMULSION INTRAVENOUS at 12:40

## 2024-03-27 RX ADMIN — KETOROLAC TROMETHAMINE 30 MG: 30 INJECTION, SOLUTION INTRAMUSCULAR; INTRAVENOUS at 13:06

## 2024-03-27 NOTE — ANESTHESIA POSTPROCEDURE EVALUATION
Post-Op Assessment Note    CV Status:  Stable    Pain management: adequate       Mental Status:  Alert and awake   Hydration Status:  Euvolemic   PONV Controlled:  Controlled   Airway Patency:  Patent     Post Op Vitals Reviewed: Yes    No anethesia notable event occurred.    Staff: Anesthesiologist               /70 (03/27/24 1429)    Temp (!) 97.2 °F (36.2 °C) (03/27/24 1429)    Pulse 88 (03/27/24 1429)   Resp 16 (03/27/24 1429)    SpO2 100 % (03/27/24 1429)

## 2024-03-27 NOTE — ANESTHESIA PREPROCEDURE EVALUATION
Procedure:  HYSTEROSCOPY, D &C W/ POLYPECTOMY, EXAM UNDER ANESTHESIA (Pelvis)    Relevant Problems   CARDIO   (+) Migraine      GYN   (+) 38 weeks gestation of pregnancy (Resolved)   (+) 39 weeks gestation of pregnancy (Resolved)      NEURO/PSYCH   (+) Migraine      PULMONARY   (+) Asthma      Other   (+) Gluten intolerance        Physical Exam    Airway    Mallampati score: II  TM Distance: >3 FB  Neck ROM: full     Dental   No notable dental hx     Cardiovascular  Rhythm: regular, Rate: normal, Cardiovascular exam normal    Pulmonary  Pulmonary exam normal Breath sounds clear to auscultation    Other Findings  post-pubertal.      Anesthesia Plan  ASA Score- 2     Anesthesia Type- general with ASA Monitors.         Additional Monitors:     Airway Plan: LMA.           Plan Factors-    Chart reviewed.   Existing labs reviewed. Patient summary reviewed.    Patient is not a current smoker. Patient not instructed to abstain from smoking on day of procedure. Patient did not smoke on day of surgery.    There is medical exclusion for perioperative obstructive sleep apnea risk education.        Induction- intravenous.    Postoperative Plan-     Informed Consent- Anesthetic plan and risks discussed with patient and spouse (Patrick).

## 2024-03-27 NOTE — OP NOTE
OPERATIVE REPORT  PATIENT NAME: Elizabeth Bull    :  1990  MRN: [id number]  Pt Location: [facility] OR ROOM 04    SURGERY DATE: 3/27/2024    Surgeons and Role:     * Nicole Donahue MD - Primary     * Naomi Coulter MD - Assisting    Preop Diagnosis:  Abnormal uterine bleeding (AUB) [N93.9]    Post-Op Diagnosis Codes:     * Abnormal uterine bleeding (AUB) [N93.9]    Procedure(s):  HYSTEROSCOPY. D &C  EXAM UNDER ANESTHESIA    Specimen(s):  ID Type Source Tests Collected by Time Destination   1 : [facility] [place] Tissue Endometrium TISSUE EXAM Nicole Donahue MD 3/27/2024 1306        Estimated Blood Loss:   Minimal    Drains:  * No LDAs found *    Anesthesia Type:   General LMA    Operative Indications:  Abnormal uterine bleeding (AUB) [N93.9]    Operative Findings:  1.  External genitalia grossly normal in appearance.  No ulcerations, no lacerations, no lesions.  Bimanual exam revealed anteverted uterus with normal contours and freely mobile.  No adnexal masses palpated bilaterally.  2.  Vagina and cervix were  grossly normal in appearance without any lacerations or lesions.   3. Uterus sounded to 8 cm.  4. Hysteroscopic examination revealed proliferative endometrial lining. No polyps visualized. Bilateral ostia were visualized.      Complications:   None apparent    Procedure and Technique:  The patient was taken to the operating room where a time out was performed to confirm correct patient and correct procedure. General LMA anesthesia (LMA) was administered and the patient was positioned on the OR table in the dorsal lithotomy position. All pressure points were padded and a wendy hugger was placed to maintain control of core body temperature. The patient was prepped and draped in the usual sterile fashion.    A straight catheter was introduced into the bladder, which was drained of 200cc of clear yellow urine. A weighted speculum was inserted into the vagina and a Sarthak retractor was used to visualize  the anterior lip of the cervix, which was then grasped with a single toothed tenaculum. The uterus was sounded to 8cm. The cervix was serially dilated to 18 [place] using indy dilators for introduction of the hysteroscope.     Hysteroscope was introduced under direct visualization using normal saline solution as the distention media. Hysteroscope was advanced to the uterine fundus and the entire uterine cavity was inspected in a systematic manner with findings as described above. Hysteroscope was withdrawn and sharp curetting was performed, starting at the 12'oclock position and rotating a total of 360 degrees to cover all surfaces. Endometrial tissue was obtained and sent for pathology.     The single toothed tenaculum was removed from the anterior lip of the cervix. Good hemostasis was confirmed at the tenaculum puncture sites. Weighted speculum was then removed from the vagina. At the conclusion of the procedure, all needle, sponge, and instrument counts were noted to be correct x2. A fluid deficit of 100 cc was noted.     Dr. Donahue was present and participated in all key portions of the case.     Patient Disposition:  PACU         SIGNATURE: Naomi Coulter MD  DATE: March 27, 2024  TIME: 1:20 PM

## 2024-04-01 PROCEDURE — 88305 TISSUE EXAM BY PATHOLOGIST: CPT | Performed by: PATHOLOGY

## 2024-04-10 ENCOUNTER — OFFICE VISIT (OUTPATIENT)
Dept: OBGYN CLINIC | Facility: MEDICAL CENTER | Age: 34
End: 2024-04-10

## 2024-04-10 VITALS
WEIGHT: 147 LBS | DIASTOLIC BLOOD PRESSURE: 78 MMHG | SYSTOLIC BLOOD PRESSURE: 124 MMHG | HEIGHT: 66 IN | BODY MASS INDEX: 23.63 KG/M2

## 2024-04-10 DIAGNOSIS — Z98.890 S/P DILATION AND CURETTAGE: ICD-10-CM

## 2024-04-10 DIAGNOSIS — N93.9 ABNORMAL UTERINE BLEEDING (AUB): Primary | ICD-10-CM

## 2024-04-10 PROCEDURE — 99024 POSTOP FOLLOW-UP VISIT: CPT | Performed by: OBSTETRICS & GYNECOLOGY

## 2024-04-10 NOTE — PROGRESS NOTES
"Subjective     Elizabeth Bull is a 33 y.o. female who presents to the clinic 2 weeks status post diagnostic hysteroscopy  D&C for abnormal uterine bleeding. Eating a regular diet without difficulty. Bowel movements are normal. The patient is not having any pain.    The following portions of the patient's history were reviewed and updated as appropriate: allergies, current medications, past family history, past medical history, past social history, past surgical history, and problem list.    Review of Systems  Pertinent items are noted in HPI.      Objective     /78   Ht 5' 6\" (1.676 m)   Wt 66.7 kg (147 lb)   LMP 03/24/2024 (Exact Date)   BMI 23.73 kg/m²   General:  alert and oriented, in no acute distress         Assessment      Doing well postoperatively.  Operative findings again reviewed. Pathology report discussed.    A. Endometrium, curettage:  - Weakly proliferative endometrium with tubal metaplasia and stromal breakdown.  - Benign squamous epithelium and endocervical glands.  - Negative for hyperplasia, atypia, and malignancy.  Plan     1. Continue any current medications.  2. Wound care discussed.  3. Activity restrictions: none  4. Anticipated return to work: not applicable.  5. Follow up: annual  We discussed if bleeding still a problem we can do conservative management  (LARC, etc) vs surgical intervention (most definitive is hysterectomy)   "

## 2024-06-21 ENCOUNTER — NURSE TRIAGE (OUTPATIENT)
Age: 34
End: 2024-06-21

## 2024-06-21 DIAGNOSIS — N92.0 MENORRHAGIA WITH REGULAR CYCLE: Primary | ICD-10-CM

## 2024-06-21 RX ORDER — TRANEXAMIC ACID 650 MG/1
1300 TABLET ORAL 3 TIMES DAILY
Qty: 30 TABLET | Refills: 3 | Status: SHIPPED | OUTPATIENT
Start: 2024-06-21

## 2024-06-21 NOTE — TELEPHONE ENCOUNTER
Patient scheduled for follow up on 7/3. Patient going away next week so unable to follow up then.

## 2024-06-21 NOTE — TELEPHONE ENCOUNTER
"Patient is calling with onset this morning at 5am of excessive and heavy bleeding. Has to change a super tampon in <1 hour, and also is using pads due to tampon leaking. States has mild cramping. Denies feeling dizzy or lightheaded. Has been passing dime-sized clots. Pt has a hx of heavy periods, and had a hysteroscopy and D&C 3/17/24. States she's never had heavy bleeding like this, even prior to procedure. RN advised due to excessive bleeding for about 4 hours now, patient should be seen in ED for further evaluation. Pt apprehensive, and also is going on vacation tomorrow. RN advised can discuss with provider and see if has other recommendations. Staff will call patient back. Advised if bleeding becomes heavier, or cramping become severe, prior to call back - go to ED. Pt agreeable to plan.    Note sent as high priority to provider along with ESC.     Answer Assessment - Initial Assessment Questions  1. AMOUNT: \"Describe the bleeding that you are having.\"     - SPOTTING: spotting, or pinkish / brownish mucous discharge; does not fill panti-liner or pad     - MILD:  less than 1 pad / hour; less than patient's usual menstrual bleeding    - MODERATE: 1-2 pads / hour; 1 menstrual cup every 6 hours; small-medium blood clots (e.g., pea, grape, small coin)    - SEVERE: soaking 2 or more pads/hour for 2 or more hours; 1 menstrual cup every 2 hours; bleeding not contained by pads or continuous red blood from vagina; large blood clots (e.g., golf ball, large coin)       Severe  2. ONSET: \"When did the bleeding begin?\" \"Is it continuing now?\"      This morning at 5am  3. MENSTRUAL PERIOD: \"When was the last normal menstrual period?\" \"How is this different than your period?\"      Heavier than normal  4. REGULARITY: \"How regular are your periods?\"      Yes  5. ABDOMINAL PAIN: \"Do you have any pain?\" \"How bad is the pain?\"  (e.g., Scale 1-10; mild, moderate, or severe)    - MILD (1-3): doesn't interfere with normal activities, " "abdomen soft and not tender to touch     - MODERATE (4-7): interferes with normal activities or awakens from sleep, tender to touch     - SEVERE (8-10): excruciating pain, doubled over, unable to do any normal activities       Mild  6. PREGNANCY: \"Could you be pregnant?\" \"Are you sexually active?\" \"Did you recently give birth?\"      Denies  7. BREASTFEEDING: \"Are you breastfeeding?\"      Denies  8. HORMONES: \"Are you taking any hormone medications, prescription or OTC?\" (e.g., birth control pills, estrogen)      Denies  9. BLOOD THINNERS: \"Do you take any blood thinners?\" (e.g., Coumadin/warfarin, Pradaxa/dabigatran, aspirin)      Denies  10. CAUSE: \"What do you think is causing the bleeding?\" (e.g., recent gyn surgery, recent gyn procedure; known bleeding disorder, cervical cancer, polycystic ovarian disease, fibroids)          Unsure  11. HEMODYNAMIC STATUS: \"Are you weak or feeling lightheaded?\" If Yes, ask: \"Can you stand and walk normally?\"         Denies  12. OTHER SYMPTOMS: \"What other symptoms are you having with the bleeding?\" (e.g., passed tissue, vaginal discharge, fever, menstrual-type cramps)        Dime size clots    Protocols used: Vaginal Bleeding - Abnormal-ADULT-[place]    "

## 2024-07-03 ENCOUNTER — OFFICE VISIT (OUTPATIENT)
Dept: OBGYN CLINIC | Facility: MEDICAL CENTER | Age: 34
End: 2024-07-03
Payer: COMMERCIAL

## 2024-07-03 VITALS
SYSTOLIC BLOOD PRESSURE: 100 MMHG | HEIGHT: 66 IN | BODY MASS INDEX: 23.63 KG/M2 | WEIGHT: 147 LBS | DIASTOLIC BLOOD PRESSURE: 80 MMHG

## 2024-07-03 DIAGNOSIS — N92.0 MENORRHAGIA WITH REGULAR CYCLE: Primary | ICD-10-CM

## 2024-07-03 PROCEDURE — 99213 OFFICE O/P EST LOW 20 MIN: CPT | Performed by: OBSTETRICS & GYNECOLOGY

## 2024-07-03 RX ORDER — LEVONORGESTREL AND ETHINYL ESTRADIOL AND ETHINYL ESTRADIOL 0.15MG(84)
1 KIT ORAL DAILY
Qty: 91 TABLET | Refills: 3 | Status: SHIPPED | OUTPATIENT
Start: 2024-07-03

## 2024-07-05 NOTE — PROGRESS NOTES
Assessment Elizabeth was seen today for follow-up.    Diagnoses and all orders for this visit:    Menorrhagia with regular cycle  -     Levonorgest-Eth Est & Eth Est 42-21-21-7 DAYS TABS; Take 1 tablet by mouth in the morning         Plan  Today we discussed possible options for management of  heavy prolonged menses including but no limited to TXA, LARC, OCP, nuvaring, surgical inervention (ablation vs hysterectomy)   Risks and benefits of each discussed   Interested in trial of OCP (we discussed [place])   All questions answered    Subjective   Elizabeth Bull is a 33 y.o. female here for a problem visit.  Patient is complaining of continued heavy menses despite D&C. States she never initiated TXA as her bleeding stopped after we spoke  on the phone . States her  current  bleeding has been going on for  about 14 days but significantly light now . Trying to avoid surgery and  no interested  in IUD or  nexplanon    . Denies pain         Patient Active Problem List   Diagnosis    Maternal care due to low transverse uterine scar from previous  delivery    Family history of congenital cardiac septal defect    Hereditary disease in family possibly affecting fetus    Previous child born with ventricular septal defect, currently pregnant, antepartum    Gluten intolerance    Status post repeat low transverse  section    Migraine    Asthma    Abnormal uterine bleeding (AUB)       Gynecologic History  Patient's last menstrual period was 2024 (exact date).    Past Medical History:   Diagnosis Date    Abnormal Pap smear of cervix     Anemia     age 12 took iron supplements x6 months    Asthma 3/27/2024    as a child    Female infertility     Gluten intolerance     Migraine 3/27/2024    age 11     Miscarriage -    Spontaneous      Last assessed: 16    Varicella      Past Surgical History:   Procedure Laterality Date     SECTION      MOUTH SURGERY      age 13    [place]   DELIVERY ONLY N/A 2017    Procedure:  SECTION ();  Surgeon: Sarah Bethea MD;  Location: [facility];  Service: Obstetrics    [place]  DELIVERY ONLY N/A 2021    Procedure:  SECTION () REPEAT;  Surgeon: Nicole Donahue MD;  Location: [facility];  Service: Obstetrics    CT HYSTEROSCOPY BX ENDOMETRIUM&/POLYPC W/WO D&C N/A 3/27/2024    Procedure: HYSTEROSCOPY, D &C  EXAM UNDER ANESTHESIA;  Surgeon: Nicole Donahue MD;  Location: [facility];  Service: Gynecology    SHOULDER SURGERY      age 16    WISDOM TOOTH EXTRACTION       Family History   Problem Relation Age of Onset    Cancer Mother         Oral    Hypertension Mother         essential    Thyroid disease Mother     Hypothyroidism Father     Hypertension Father     Thyroid disease Father     Hypertension Sister     Migraines Sister         headaches    Cancer Maternal Grandmother         Breast    Hypertension Maternal Grandmother         essential    Breast cancer Maternal Grandmother         diagnosed in 70s    Thyroid disease Maternal Grandmother     Diabetes Maternal Grandfather     Hypertension Maternal Grandfather         essential    Lung cancer Maternal Grandfather     Cancer Maternal Grandfather         Lung    Cancer Paternal Grandmother         Uterine    Uterine cancer Paternal Grandmother         diagnosed in 70's    Hypertension Sister     Colon cancer Neg Hx      Social History     Socioeconomic History    Marital status: /Civil Union     Spouse name: Not on file    Number of children: Not on file    Years of education: Not on file    Highest education level: Not on file   Occupational History    Not on file   Tobacco Use    Smoking status: Never    Smokeless tobacco: Never   Vaping Use    Vaping status: Never Used   Substance and Sexual Activity    Alcohol use: Not Currently     Comment: socially/prior to pregnancy only    Drug use: No    Sexual activity: Yes     Partners: Male     Birth  control/protection: Male Sterilization   Other Topics Concern    Not on file   Social History Narrative    Attempting to conceive     Social Determinants of Health     Financial Resource Strain: Not on file   Food Insecurity: Not on file   Transportation Needs: Not on file   Physical Activity: Not on file   Stress: Not on file   Social Connections: Not on file   Intimate Partner Violence: Not on file   Housing Stability: Not on file     Allergies   Allergen Reactions    Gluten Meal - Food Allergy Rash    Penicillins Rash    Sulfa Antibiotics Rash       Current Outpatient Medications:     Ascorbic Acid (vitamin C) 100 MG tablet, Take 100 mg by mouth daily, Disp: , Rfl:     Cholecalciferol (Vitamin D) 50 MCG (2000 UT) tablet, , Disp: , Rfl:     Levonorgest-Eth Est & Eth Est 42-21-21-7 DAYS TABS, Take 1 tablet by mouth in the morning, Disp: 91 tablet, Rfl: 3    Turmeric 500 MG CAPS, , Disp: , Rfl:     acetaminophen (TYLENOL) 325 mg tablet, Take 3 tablets (975 mg total) by mouth every 6 (six) hours, Disp: , Rfl: 0    fexofenadine (ALLEGRA) 180 MG tablet, , Disp: , Rfl:     ibuprofen (MOTRIN) 600 mg tablet, Take 1 tablet (600 mg total) by mouth every 6 (six) hours, Disp: 30 tablet, Rfl: 0    Tranexamic Acid 650 MG TABS, Take 2 tablets (1,300 mg total) by mouth 3 (three) times a day (Patient not taking: Reported on 7/3/2024), Disp: 30 tablet, Rfl: 3    Review of Systems  Constitutional :no fever, feels well, no tiredness, no recent weight gain or loss  ENT: no ear ache, no loss of hearing, no nosebleeds or nasal discharge, no sore throat or hoarseness.  Cardiovascular: no complaints of slow or fast heart beat, no chest pain, no palpitations, no leg claudication or lower extremity edema.  Respiratory: no complaints of shortness of shortness of breath, no FISHER  Breasts:no complaints of breast pain, breast lump, or nipple discharge  Gastrointestinal: no complaints of abdominal pain, constipation, nausea, vomiting, or diarrhea  "or bloody stools  Genitourinary : as noted in HPI.  Musculoskeletal: no complaints of arthralgia, no myalgia, no joint swelling or stiffness, no limb pain or swelling.  Integumentary: no complaints of skin rash or lesion, itching or dry skin  Neurological: no complaints of headache, no confusion, no numbness or tingling, no dizziness or fainting     Objective     /80   Ht 5' 6\" (1.676 m)   Wt 66.7 kg (147 lb)   LMP 06/20/2024 (Exact Date)   BMI 23.73 kg/m²     General:   appears stated age, cooperative, alert normal mood and affect   Lungs: Unlabored breathing     Skin normal skin turgor and no rashes.   Psychiatric orientation to person, place, and time: normal. mood and affect: normal      "

## 2024-08-01 DIAGNOSIS — N93.9 ABNORMAL UTERINE BLEEDING (AUB): Primary | ICD-10-CM

## 2024-08-05 ENCOUNTER — TELEPHONE (OUTPATIENT)
Age: 34
End: 2024-08-05

## 2024-08-05 NOTE — TELEPHONE ENCOUNTER
Pt is calling if she needs to stop taking medication -   norethindrone (Aygestin) 5 mg tablet  . Please kindly follow up w/ pt

## 2024-08-06 NOTE — TELEPHONE ENCOUNTER
Patient called to report she has been taking Norethindrone 2 x daily since 8/3/24. Her bleeding is decreasing and her cramping is gone.  Her follow up appointment is on 8/12/24.  She wants to know how long she should continue her Rx.  Advised patient to continue until her appointment and discuss with the doctor then. Informed patient she will be called back if her doctor has any different advice.

## 2024-08-12 ENCOUNTER — OFFICE VISIT (OUTPATIENT)
Dept: OBGYN CLINIC | Facility: MEDICAL CENTER | Age: 34
End: 2024-08-12
Payer: COMMERCIAL

## 2024-08-12 VITALS
SYSTOLIC BLOOD PRESSURE: 120 MMHG | DIASTOLIC BLOOD PRESSURE: 80 MMHG | BODY MASS INDEX: 24.03 KG/M2 | HEIGHT: 66 IN | WEIGHT: 149.5 LBS

## 2024-08-12 DIAGNOSIS — N92.6 IRREGULAR MENSES: Primary | ICD-10-CM

## 2024-08-12 PROCEDURE — 99213 OFFICE O/P EST LOW 20 MIN: CPT | Performed by: OBSTETRICS & GYNECOLOGY

## 2024-08-12 RX ORDER — NORGESTIMATE AND ETHINYL ESTRADIOL 0.25-0.035
1 KIT ORAL DAILY
Qty: 84 TABLET | Refills: 3 | Status: SHIPPED | OUTPATIENT
Start: 2024-08-12

## 2024-08-14 ENCOUNTER — APPOINTMENT (OUTPATIENT)
Dept: LAB | Facility: MEDICAL CENTER | Age: 34
End: 2024-08-14
Payer: COMMERCIAL

## 2024-08-14 DIAGNOSIS — N92.6 IRREGULAR MENSES: ICD-10-CM

## 2024-08-14 LAB
BASOPHILS # BLD AUTO: 0.06 THOUSANDS/ÂΜL (ref 0–0.1)
BASOPHILS NFR BLD AUTO: 1 % (ref 0–1)
EOSINOPHIL # BLD AUTO: 0.08 THOUSAND/ÂΜL (ref 0–0.61)
EOSINOPHIL NFR BLD AUTO: 1 % (ref 0–6)
ERYTHROCYTE [DISTWIDTH] IN BLOOD BY AUTOMATED COUNT: 12.2 % (ref 11.6–15.1)
HCT VFR BLD AUTO: 42 % (ref 34.8–46.1)
HGB BLD-MCNC: 13.5 G/DL (ref 11.5–15.4)
IMM GRANULOCYTES # BLD AUTO: 0.02 THOUSAND/UL (ref 0–0.2)
IMM GRANULOCYTES NFR BLD AUTO: 0 % (ref 0–2)
LYMPHOCYTES # BLD AUTO: 2.36 THOUSANDS/ÂΜL (ref 0.6–4.47)
LYMPHOCYTES NFR BLD AUTO: 37 % (ref 14–44)
MCH RBC QN AUTO: 30.1 PG (ref 26.8–34.3)
MCHC RBC AUTO-ENTMCNC: 32.1 G/DL (ref 31.4–37.4)
MCV RBC AUTO: 94 FL (ref 82–98)
MONOCYTES # BLD AUTO: 0.53 THOUSAND/ÂΜL (ref 0.17–1.22)
MONOCYTES NFR BLD AUTO: 8 % (ref 4–12)
NEUTROPHILS # BLD AUTO: 3.29 THOUSANDS/ÂΜL (ref 1.85–7.62)
NEUTS SEG NFR BLD AUTO: 53 % (ref 43–75)
NRBC BLD AUTO-RTO: 0 /100 WBCS
PLATELET # BLD AUTO: 295 THOUSANDS/UL (ref 149–390)
PMV BLD AUTO: 11.7 FL (ref 8.9–12.7)
RBC # BLD AUTO: 4.48 MILLION/UL (ref 3.81–5.12)
WBC # BLD AUTO: 6.34 THOUSAND/UL (ref 4.31–10.16)

## 2024-08-14 PROCEDURE — 36415 COLL VENOUS BLD VENIPUNCTURE: CPT

## 2024-08-14 PROCEDURE — 85025 COMPLETE CBC W/AUTO DIFF WBC: CPT

## 2024-08-15 NOTE — PROGRESS NOTES
Assessment/Plan  Elizabeth was seen today for follow-up.    Diagnoses and all orders for this visit:    Irregular menses  -     CBC and differential; Future  -     norgestimate-ethinyl estradiol (Sprintec 28) 0.25-35 MG-MCG per tablet; Take 1 tablet by mouth daily  Currently  on Aygestin and transitioning to OCP  Today we discussed management options  from  least invasive to most invasive    We discussed  LARC , OCP and other birth control methods  , as well as  hysterectomy as most definitive management   She is trying to avoid surgical route  but not  opposed to it if fails birth control ( has not tried OCP)   We discussed risks and benefits of each interventions and desires trial of OCP for now   She will message me  via portal in some months after initiation of OCP to let me know how she is doing   Will folllow  PRN or  for annual visit      S/P D&C   -A. Endometrium, curettage:  -   Weakly proliferative endometrium with tubal metaplasia and stromal breakdown.   -   Benign squamous epithelium and endocervical glands.  -   Negative for hyperplasia, atypia, and malignancy.            Subjective   Elizabeth Bull is a 34 y.o. female here for a problem visit.  Patient is complaining of continued irregular bleeding  . Initiated this current cycle approx in   and has been bleeding since despite  use of  Aygestin. Bleeding is not heavy every day but she does need to use  pads or tampons  daily  .     Patient Active Problem List   Diagnosis    Maternal care due to low transverse uterine scar from previous  delivery    Family history of congenital cardiac septal defect    Hereditary disease in family possibly affecting fetus    Previous child born with ventricular septal defect, currently pregnant, antepartum    Gluten intolerance    Status post repeat low transverse  section    Migraine    Asthma    Abnormal uterine bleeding (AUB)       Gynecologic History  Patient's last menstrual period was  2024 (approximate).      Past Medical History:   Diagnosis Date    Abnormal Pap smear of cervix     Anemia     age 12 took iron supplements x6 months    Asthma 3/27/2024    as a child    Female infertility     Gluten intolerance     Migraine 3/27/2024    age 11     Miscarriage 6-2016    Spontaneous      Last assessed: 14    Varicella      Past Surgical History:   Procedure Laterality Date     SECTION      MOUTH SURGERY      age 13    [place]  DELIV[place] ONLY N/A 2017    Procedure:  SECTION ();  Surgeon: Sarah Bethea MD;  [facility];  Service: Obstetrics    [place]  DELIVERY ONLY N/A 2021    Procedure:  SECTION () REPEAT;  Surgeon: Nicole Donahue MD;  Location[facility];  Service: Obstetrics    [place] HYSTEROSCOPY BX ENDOMETRIUM&/POLYPC W/WO D&C N/A 3/27/2024    Procedure: HYSTEROSCOPY, D &C  EXAM UNDER ANESTHESIA;  Surgeon: Nicole Donahue MD;  Location[facility];  Service: Gynecology    SHOULDER SURGERY      age 16    WISDOM TOOTH EXTRACTION       Family History   Problem Relation Age of Onset    Cancer Mother         Oral    Hypertension Mother         essential    Thyroid disease Mother     Hypothyroidism Father     Hypertension Father     Thyroid disease Father     Hypertension Sister     Migraines Sister         headaches    Cancer Maternal Grandmother         Breast    Hypertension Maternal Grandmother         essential    Breast cancer Maternal Grandmother         diagnosed in 70's    Thyroid disease Maternal Grandmother     Diabetes Maternal Grandfather     Hypertension Maternal Grandfather         essential    Lung cancer Maternal Grandfather     Cancer Maternal Grandfather         Lung    Cancer Paternal Grandmother         Uterine    Uterine cancer Paternal Grandmother         diagnosed in 70's    Hypertension Sister     Colon cancer Neg Hx      Social History     Socioeconomic History    Marital status:  /Civil Union     Spouse name: Not on file    Number of children: Not on file    Years of education: Not on file    Highest education level: Not on file   Occupational History    Not on file   Tobacco Use    Smoking status: Never    Smokeless tobacco: Never   Vaping Use    Vaping status: Never Used   Substance and Sexual Activity    Alcohol use: Not Currently     Comment: socially/prior to pregnancy only    Drug use: No    Sexual activity: Yes     Partners: Male     Birth control/protection: Male Sterilization   Other Topics Concern    Not on file   Social History Narrative    Attempting to conceive     Social Determinants of Health     Financial Resource Strain: Not on file   Food Insecurity: Not on file   Transportation Needs: Not on file   Physical Activity: Not on file   Stress: Not on file   Social Connections: Not on file   Intimate Partner Violence: Not on file   Housing Stability: Not on file     Allergies   Allergen Reactions    Gluten Meal - Food Allergy Rash    Penicillins Rash    Sulfa Antibiotics Rash       Current Outpatient Medications:     Ascorbic Acid (vitamin C) 100 MG tablet, Take 100 mg by mouth daily, Disp: , Rfl:     Cholecalciferol (Vitamin D) 50 MCG (2000 UT) tablet, , Disp: , Rfl:     norethindrone (Aygestin) 5 mg tablet, Take 2 tablets (10 mg total) by mouth daily, Disp: 30 tablet, Rfl: 1    norgestimate-ethinyl estradiol (Sprintec 28) 0.25-35 MG-MCG per tablet, Take 1 tablet by mouth daily, Disp: 84 tablet, Rfl: 3    Turmeric 500 MG CAPS, , Disp: , Rfl:     acetaminophen (TYLENOL) 325 mg tablet, Take 3 tablets (975 mg total) by mouth every 6 (six) hours, Disp: , Rfl: 0    fexofenadine (ALLEGRA) 180 MG tablet, , Disp: , Rfl:     ibuprofen (MOTRIN) 600 mg tablet, Take 1 tablet (600 mg total) by mouth every 6 (six) hours, Disp: 30 tablet, Rfl: 0    Levonorgest-Eth Est & Eth Est 42-21-21-7 DAYS TABS, Take 1 tablet by mouth in the morning, Disp: 91 tablet, Rfl: 3    Tranexamic Acid 650  "MG TABS, Take 2 tablets (1,300 mg total) by mouth 3 (three) times a day (Patient not taking: Reported on 7/3/2024), Disp: 30 tablet, Rfl: 3    Review of Systems  Constitutional :no fever, feels well, no tiredness, no recent weight gain or loss  ENT: no ear ache, no loss of hearing, no nosebleeds or nasal discharge, no sore throat or hoarseness.  Cardiovascular: no complaints of slow or fast heart beat, no chest pain, no palpitations, no leg claudication or lower extremity edema.  Respiratory: no complaints of shortness of shortness of breath, no FISHER  Breasts:no complaints of breast pain, breast lump, or nipple discharge  Gastrointestinal: no complaints of abdominal pain, constipation, nausea, vomiting, or diarrhea or bloody stools  Genitourinary : as noted in HPI.  Musculoskeletal: no complaints of arthralgia, no myalgia, no joint swelling or stiffness, no limb pain or swelling.  Integumentary: no complaints of skin rash or lesion, itching or dry skin  Neurological: no complaints of headache, no confusion, no numbness or tingling, no dizziness or fainting     Objective     /80   Ht 5' 6\" (1.676 m)   Wt 67.8 kg (149 lb 8 oz)   LMP 07/28/2024 (Approximate)   BMI 24.13 kg/m²     General:   appears stated age, cooperative, alert normal mood and affect   Lungs: Unlabored     Skin normal skin turgor and no rashes.   Psychiatric orientation to person, place, and time: normal. mood and affect: normal      "

## 2024-08-26 DIAGNOSIS — N92.6 IRREGULAR MENSES: ICD-10-CM

## 2024-08-26 RX ORDER — NORGESTIMATE AND ETHINYL ESTRADIOL 0.25-0.035
1 KIT ORAL DAILY
Qty: 84 TABLET | Refills: 1 | Status: SHIPPED | OUTPATIENT
Start: 2024-08-26

## 2024-08-26 NOTE — TELEPHONE ENCOUNTER
NOT A DUPLICATE... SENDING TO A NEW PHARMACY    Reason for call:   [x] Refill   [] Prior Auth  [] Other:     Office:   [] PCP/Provider -   [x] Specialty/Provider - [facility]     Medication: norgestimate-ethinyl estradiol (Sprintec 28) 0.25-35 MG-MCG per tablet     Dose/Frequency: Take 1 tablet by mouth daily,     Quantity: 84    Pharmacy: [facility] - [address]     Does the patient have enough for 3 days?   [x] Yes   [] No - Send as HP to POD

## 2024-11-25 ENCOUNTER — PATIENT MESSAGE (OUTPATIENT)
Dept: OBGYN CLINIC | Facility: MEDICAL CENTER | Age: 34
End: 2024-11-25

## 2024-11-25 DIAGNOSIS — N92.6 IRREGULAR MENSES: ICD-10-CM

## 2024-11-25 RX ORDER — NORGESTIMATE AND ETHINYL ESTRADIOL 0.25-0.035
1 KIT ORAL DAILY
Qty: 84 TABLET | Refills: 1 | Status: SHIPPED | OUTPATIENT
Start: 2024-11-25

## 2024-12-11 ENCOUNTER — ANNUAL EXAM (OUTPATIENT)
Dept: OBGYN CLINIC | Facility: MEDICAL CENTER | Age: 34
End: 2024-12-11
Payer: COMMERCIAL

## 2024-12-11 VITALS
DIASTOLIC BLOOD PRESSURE: 70 MMHG | SYSTOLIC BLOOD PRESSURE: 118 MMHG | WEIGHT: 149 LBS | BODY MASS INDEX: 23.95 KG/M2 | HEIGHT: 66 IN

## 2024-12-11 DIAGNOSIS — N92.0 MENORRHAGIA WITH REGULAR CYCLE: ICD-10-CM

## 2024-12-11 DIAGNOSIS — Z01.419 ENCOUNTER FOR GYNECOLOGICAL EXAMINATION: Primary | ICD-10-CM

## 2024-12-11 PROCEDURE — S0612 ANNUAL GYNECOLOGICAL EXAMINA: HCPCS | Performed by: OBSTETRICS & GYNECOLOGY

## 2024-12-11 RX ORDER — NORETHINDRONE 5 MG/1
5 TABLET ORAL DAILY
Qty: 90 TABLET | Refills: 3 | Status: SHIPPED | OUTPATIENT
Start: 2024-12-11

## 2024-12-11 NOTE — PROGRESS NOTES
ASSESSMENT & PLAN: Elizabeth Bull is a 34 y.o.  with normal gynecologic exam.    1.  Routine well woman exam done today  2.  Pap and HPV:  The patient's last pap and hpv was .    It was normal.    Pap and cotesting was not done today.    Current ASCCP Guidelines reviewed.   3.  The following were reviewed in today's visit: breast self exam, exercise, and healthy diet.  4. Menorrhagia  - currently  on  sprintec /feels this has helped with bleeding but having headache / we discussed switching to progesterone only vs IUD  vs surgical intervention / desires to try progesterone before  , we discussed and ordered  pelvic  US to make sure no new pathology from one year ago     CC:  Annual Gynecologic Examination    HPI: Elizabeth Bull is a 34 y.o.  who presents for annual gynecologic examination.  She has the following concerns:  headaches  maybe from  OCP  ? . Has noticed sprintec has helped most with her  cycle      Health Maintenance:    She wears her seatbelt routinely.    She does perform regular monthly self breast exams.    She feels safe at home.     Past Medical History:   Diagnosis Date    Abnormal Pap smear of cervix     Anemia     age 12 took iron supplements x6 months    Asthma 3/27/2024    as a child    Female infertility     Gluten intolerance     Migraine 3/27/2024    age 11     Miscarriage -    Spontaneous      Last assessed: 16    Varicella        Past Surgical History:   Procedure Laterality Date     SECTION      MOUTH SURGERY      age 13    [place]  DELIVERY ONLY N/A 2017    Procedure:  SECTION ();  Surgeon: Sarah Bethea MD;  Location: [facility];  Service: Obstetrics    [place]  DELIVERY ONLY N/A 2021    Procedure:  SECTION () REPEAT;  Surgeon: Nicole Donahue MD;  Location: [facility];  Service: Obstetrics    [place] HYSTEROSCOPY BX ENDOMETRIUM&/POLYPC W/WO D&C N/A 3/27/2024    Procedure:  HYSTEROSCOPY, D &C  EXAM UNDER ANESTHESIA;  Surgeon: Nicole Donahue MD;  Location: [facility];  Service: Gynecology    SHOULDER SURGERY      age 16    WISDOM TOOTH EXTRACTION         Past OB/Gyn History:  OB History          3    Para   2    Term   2       0    AB   1    Living   2         SAB   1    IAB   0    Ectopic   0    Multiple   0    Live Births   2               Pt has menstrual issues.    History of sexually transmitted infection: No.  History of abnormal pap smears: No .    Patient is currently sexually active.  heterosexual.  The current method of family planning is vasectomy.    Family History   Problem Relation Age of Onset    Cancer Mother         Oral    Hypertension Mother         essential    Thyroid disease Mother     Hypothyroidism Father     Hypertension Father     Thyroid disease Father     Hypertension Sister     Migraines Sister         headaches    Cancer Maternal Grandmother         Breast    Hypertension Maternal Grandmother         essential    Breast cancer Maternal Grandmother         diagnosed in 70's    Thyroid disease Maternal Grandmother     Diabetes Maternal Grandfather     Hypertension Maternal Grandfather         essential    Lung cancer Maternal Grandfather     Cancer Maternal Grandfather         Lung    Cancer Paternal Grandmother         Uterine    Uterine cancer Paternal Grandmother         diagnosed in 70's    Hypertension Sister     Colon cancer Neg Hx        Social History:  Social History     Socioeconomic History    Marital status: /Civil Union     Spouse name: Not on file    Number of children: Not on file    Years of education: Not on file    Highest education level: Not on file   Occupational History    Not on file   Tobacco Use    Smoking status: Never    Smokeless tobacco: Never   Vaping Use    Vaping status: Never Used   Substance and Sexual Activity    Alcohol use: Not Currently     Comment: socially/prior to pregnancy only    Drug  use: No    Sexual activity: Yes     Partners: Male     Birth control/protection: Male Sterilization   Other Topics Concern    Not on file   Social History Narrative    Attempting to conceive     Social Drivers of Health     Financial Resource Strain: Not on file   Food Insecurity: Not on file   Transportation Needs: Not on file   Physical Activity: Not on file   Stress: Not on file   Social Connections: Not on file   Intimate Partner Violence: Not on file   Housing Stability: Not on file         Allergies   Allergen Reactions    Gluten Meal - Food Allergy Rash    Penicillins Rash    Sulfa Antibiotics Rash         Current Outpatient Medications:     Ascorbic Acid (vitamin C) 100 MG tablet, Take 100 mg by mouth daily, Disp: , Rfl:     Cholecalciferol (Vitamin D) 50 MCG (2000 UT) tablet, , Disp: , Rfl:     norethindrone (Aygestin) 5 mg tablet, Take 1 tablet (5 mg total) by mouth daily, Disp: 90 tablet, Rfl: 3    Turmeric 500 MG CAPS, , Disp: , Rfl:     acetaminophen (TYLENOL) 325 mg tablet, Take 3 tablets (975 mg total) by mouth every 6 (six) hours, Disp: , Rfl: 0    fexofenadine (ALLEGRA) 180 MG tablet, , Disp: , Rfl:     ibuprofen (MOTRIN) 600 mg tablet, Take 1 tablet (600 mg total) by mouth every 6 (six) hours, Disp: 30 tablet, Rfl: 0      Review of Systems  Constitutional :no fever, feels well, no tiredness, no recent weight gain or loss  ENT: no ear ache, no loss of hearing, no nosebleeds or nasal discharge, no sore throat or hoarseness.  Cardiovascular: no complaints of slow or fast heart beat, no chest pain, no palpitations, no leg claudication or lower extremity edema.  Respiratory: no complaints of shortness of shortness of breath, no FISHER  Breasts:no complaints of breast pain, breast lump, or nipple discharge  Gastrointestinal: no complaints of abdominal pain, constipation, nausea, vomiting, or diarrhea or bloody stools  Genitourinary : no complaints of dysuria, incontinence, pelvic pain, no dysmenorrhea,  "vaginal discharge or abnormal vaginal bleeding and as noted in HPI.  Musculoskeletal: no complaints of arthralgia, no myalgia, no joint swelling or stiffness, no limb pain or swelling.  Integumentary: no complaints of skin rash or lesion, itching or dry skin  Neurological: no complaints of headache, no confusion, no numbness or tingling, no dizziness or fainting    Objective      /70   Ht 5' 6\" (1.676 m)   Wt 67.6 kg (149 lb)   LMP 11/27/2024 (Exact Date)   BMI 24.05 kg/m²   General:   appears stated age, cooperative, alert normal mood and affect   Lungs: Unlabored     Breasts: normal appearance, no masses or tenderness, Inspection negative, No nipple retraction or dimpling, No nipple discharge or bleeding, No axillary or supraclavicular adenopathy, Normal to palpation without dominant masses   Abdomen: soft, non-tender, without masses or organomegaly   Vulva: normal, normal female genitalia, Bartholin's, Urethra, [place] normal, no lesions, normal female hair distribution, no clitoral enlargement   Vagina: normal vagina, no discharge, exudate, lesion, or erythema   Urethra: normal   Cervix: Normal, no discharge. Nontender.   Uterus: normal size, contour, position, consistency, mobility, non-tender   Adnexa: no mass, fullness, tenderness   Psychiatric orientation to person, place, and time: normal. mood and affect: normal      "

## 2025-01-10 ENCOUNTER — HOSPITAL ENCOUNTER (OUTPATIENT)
Dept: ULTRASOUND IMAGING | Facility: MEDICAL CENTER | Age: 35
Discharge: HOME | End: 2025-01-10
Payer: COMMERCIAL

## 2025-01-10 DIAGNOSIS — N92.0 MENORRHAGIA WITH REGULAR CYCLE: ICD-10-CM

## 2025-01-10 PROCEDURE — 76830 TRANSVAGINAL US NON-OB: CPT

## 2025-01-10 PROCEDURE — 76856 US EXAM PELVIC COMPLETE: CPT

## 2025-01-17 ENCOUNTER — RESULTS FOLLOW-UP (OUTPATIENT)
Dept: OBGYN CLINIC | Facility: MEDICAL CENTER | Age: 35
End: 2025-01-17

## 2025-02-05 ENCOUNTER — OFFICE VISIT (OUTPATIENT)
Dept: URGENT CARE | Age: 35
End: 2025-02-05
Payer: COMMERCIAL

## 2025-02-05 VITALS
RESPIRATION RATE: 18 BRPM | TEMPERATURE: 99 F | OXYGEN SATURATION: 98 % | SYSTOLIC BLOOD PRESSURE: 116 MMHG | DIASTOLIC BLOOD PRESSURE: 78 MMHG | HEART RATE: 116 BPM

## 2025-02-05 DIAGNOSIS — R68.89 FLU-LIKE SYMPTOMS: Primary | ICD-10-CM

## 2025-02-05 PROCEDURE — 99213 OFFICE O/P EST LOW 20 MIN: CPT

## 2025-02-05 RX ORDER — OSELTAMIVIR PHOSPHATE 75 MG/1
75 CAPSULE ORAL EVERY 12 HOURS SCHEDULED
Qty: 10 CAPSULE | Refills: 0 | Status: SHIPPED | OUTPATIENT
Start: 2025-02-05 | End: 2025-02-10

## 2025-02-05 RX ORDER — OSELTAMIVIR PHOSPHATE 75 MG/1
75 CAPSULE ORAL EVERY 12 HOURS SCHEDULED
Qty: 10 CAPSULE | Refills: 0 | Status: SHIPPED | OUTPATIENT
Start: 2025-02-05 | End: 2025-02-05 | Stop reason: SDUPTHER

## 2025-02-05 NOTE — PATIENT INSTRUCTIONS
Take Tamiflu as prescribed.    For nasal/sinus congestion you can try steam, warm compresses, saline nasal spray, Neti pot, nasal steroid (Flonase, Nasocort), or nasal decongestant (Afrin - for 3 days only).    You can try a decongestant (Sudafed) if > 6 years of age and no history of high blood pressure.    For cough you can take an over-the-counter expectorant such as plain Robitussion or Mucinex. A spoonful of honey at bedtime may also be helpful.    For cold symptoms with high blood pressure take Coricidin cough/cold.     For sore throat you can use Cepacol lozenges, do warm salt water gargles, drink warm water with lemon or herbal teas, or use an over-the-counter throat spray (Chloraseptic).    You can take ibuprofen/Motrin and acetaminophen/Tylenol as needed for pain, fever, body aches. Do not take ibuprofen/Motrin/Advil if you have a history of heart disease, bleeding ulcers, or if you take blood thinners.     Drink plenty of fluids to stay hydrated. Airborne or Emergen-C for extra vitamin C and zinc.    Follow up with your PCP in 3-5 days for persistent symptoms.    Go to the ER if symptoms worsen.

## 2025-02-05 NOTE — PROGRESS NOTES
[facility]        NAME: Elizabeth Bull is a 34 y.o. female  : 1990    MRN: [id number]  DATE: 2025  TIME: 9:31 AM    Assessment and Plan   Flu-like symptoms [R68.89]  1. Flu-like symptoms  oseltamivir (TAMIFLU) 75 mg capsule            Patient Instructions     Take Tamiflu as prescribed.    For nasal/sinus congestion you can try steam, warm compresses, saline nasal spray, Neti pot, nasal steroid (Flonase, Nasocort), or nasal decongestant (Afrin - for 3 days only).    You can try a decongestant (Sudafed) if > 6 years of age and no history of high blood pressure.    For cough you can take an over-the-counter expectorant such as plain Robitussion or Mucinex. A spoonful of honey at bedtime may also be helpful.    For cold symptoms with high blood pressure take Coricidin cough/cold.     For sore throat you can use Cepacol lozenges, do warm salt water gargles, drink warm water with lemon or herbal teas, or use an over-the-counter throat spray (Chloraseptic).    You can take ibuprofen/Motrin and acetaminophen/Tylenol as needed for pain, fever, body aches. Do not take ibuprofen/Motrin/Advil if you have a history of heart disease, bleeding ulcers, or if you take blood thinners.     Drink plenty of fluids to stay hydrated. Airborne or Emergen-C for extra vitamin C and zinc.    Follow up with your PCP in 3-5 days for persistent symptoms.    Go to the ER if symptoms worsen.     If tests are performed, our office will contact you with results only if changes need to made to the care plan discussed with you at the visit. You can review your full results on [facility].      Chief Complaint     Chief Complaint   Patient presents with    Cold Like Symptoms     Started Monday night with symptoms  Body aces, chills, sore throat, son tested positive for flu A on Monday   States woke up today with fever 101.9  Took motrin about 0700 this am          History of Present Illness       34-year-old  female presenting with flu-like symptoms x 2 days. Patient with body aches, fevers 101.9, chills, and sore throat. Denies significant congestion / cough. Decreased appetite, no vomiting or diarrhea. Took Motrin this AM for fever control. Son tested positive for influenza A on Monday.        Review of Systems   Review of Systems   Constitutional:  Positive for appetite change, chills, fatigue and fever.   HENT:  Positive for sore throat. Negative for congestion and ear pain.    Eyes:  Negative for discharge and redness.   Respiratory:  Negative for cough, shortness of breath and wheezing.    Cardiovascular:  Negative for chest pain and palpitations.   Gastrointestinal:  Negative for abdominal pain, diarrhea and vomiting.   Skin:  Negative for rash.   Neurological:  Negative for dizziness, light-headedness and headaches.         Current Medications       Current Outpatient Medications:     Ascorbic Acid (vitamin C) 100 MG tablet, Take 100 mg by mouth daily, Disp: , Rfl:     Cholecalciferol (Vitamin D) 50 MCG (2000 UT) tablet, , Disp: , Rfl:     norethindrone (Aygestin) 5 mg tablet, Take 1 tablet (5 mg total) by mouth daily, Disp: 90 tablet, Rfl: 3    oseltamivir (TAMIFLU) 75 mg capsule, Take 1 capsule (75 mg total) by mouth every 12 (twelve) hours for 5 days, Disp: 10 capsule, Rfl: 0    Turmeric 500 MG CAPS, , Disp: , Rfl:     acetaminophen (TYLENOL) 325 mg tablet, Take 3 tablets (975 mg total) by mouth every 6 (six) hours, Disp: , Rfl: 0    fexofenadine (ALLEGRA) 180 MG tablet, , Disp: , Rfl:     ibuprofen (MOTRIN) 600 mg tablet, Take 1 tablet (600 mg total) by mouth every 6 (six) hours, Disp: 30 tablet, Rfl: 0    Current Allergies     Allergies as of 02/05/2025 - Reviewed 02/05/2025   Allergen Reaction Noted    Gluten meal - food allergy Rash 09/13/2021    Penicillins Rash 08/21/2017    Sulfa antibiotics Rash 08/21/2017            The following portions of the patient's history were reviewed and updated as  appropriate: allergies, current medications, past family history, past medical history, past social history, past surgical history and problem list.     Past Medical History:   Diagnosis Date    Abnormal Pap smear of cervix     Anemia     age 12 took iron supplements x6 months    Asthma 3/27/2024    as a child    Female infertility     Gluten intolerance     Migraine 3/27/2024    age 11     Miscarriage 6-2016    Spontaneous      Last assessed: 14    Varicella        Past Surgical History:   Procedure Laterality Date     SECTION      MOUTH SURGERY      age 13    [place]  DELIV[place] N/A 2017    Procedure:  SECTION ();  Surgeon: Sarah Bethea MD;  Location: [facility];  Service: Obstetrics    [place]  DELIVERY ONLY N/A 2021    Procedure:  SECTION () REPEAT;  Surgeon: Nicole Donahue MD;  Location: [facility];  Service: Obstetrics    [place] HYSTEROSCOPY BX ENDOMETRIUM&/POLYPC W/WO D&C N/A 3/27/2024    Procedure: HYSTEROSCOPY, D &C  EXAM UNDER ANESTHESIA;  Surgeon: Nicole Donahue MD;  Location: [facility];  Service: Gynecology    SHOULDER SURGERY      age 16    WISDOM TOOTH EXTRACTION         Family History   Problem Relation Age of Onset    Cancer Mother         Oral    Hypertension Mother         essential    Thyroid disease Mother     Hypothyroidism Father     Hypertension Father     Thyroid disease Father     Hypertension Sister     Migraines Sister         headaches    Cancer Maternal Grandmother         Breast    Hypertension Maternal Grandmother         essential    Breast cancer Maternal Grandmother         diagnosed in 70's    Thyroid disease Maternal Grandmother     Diabetes Maternal Grandfather     Hypertension Maternal Grandfather         essential    Lung cancer Maternal Grandfather     Cancer Maternal Grandfather         Lung    Cancer Paternal Grandmother         Uterine    Uterine cancer Paternal Grandmother         diagnosed  in 70's    Hypertension Sister     Colon cancer Neg Hx          Medications have been verified.        Objective   /78   Pulse (!) 116   Temp 99 °F (37.2 °C)   Resp 18   SpO2 98%        Physical Exam     Physical Exam  Vitals and nursing note reviewed.   Constitutional:       General: She is not in acute distress.     Appearance: She is not ill-appearing or diaphoretic.   HENT:      Head: Normocephalic and atraumatic.      Right Ear: Tympanic membrane, ear canal and external ear normal.      Left Ear: Tympanic membrane, ear canal and external ear normal.      Nose: Nose normal.      Mouth/Throat:      Mouth: Mucous membranes are moist.      Pharynx: Oropharynx is clear. Posterior oropharyngeal erythema present. No oropharyngeal exudate.   Eyes:      Conjunctiva/sclera: Conjunctivae normal.   Cardiovascular:      Rate and Rhythm: Normal rate and regular rhythm.   Pulmonary:      Effort: Pulmonary effort is normal.      Breath sounds: Normal breath sounds. No wheezing, rhonchi or rales.   Musculoskeletal:         General: Normal range of motion.      Cervical back: Normal range of motion and neck supple.   Skin:     General: Skin is warm and dry.      Capillary Refill: Capillary refill takes less than 2 seconds.   Neurological:      Mental Status: She is alert and oriented to person, place, and time.

## 2025-05-22 ENCOUNTER — OFFICE VISIT (OUTPATIENT)
Age: 35
End: 2025-05-22
Payer: COMMERCIAL

## 2025-05-22 VITALS
BODY MASS INDEX: 24.17 KG/M2 | WEIGHT: 150.4 LBS | SYSTOLIC BLOOD PRESSURE: 118 MMHG | DIASTOLIC BLOOD PRESSURE: 70 MMHG | HEIGHT: 66 IN | TEMPERATURE: 98.2 F | OXYGEN SATURATION: 98 % | HEART RATE: 74 BPM

## 2025-05-22 DIAGNOSIS — N93.9 ABNORMAL UTERINE BLEEDING (AUB): Primary | ICD-10-CM

## 2025-05-22 DIAGNOSIS — K90.41 GLUTEN INTOLERANCE: ICD-10-CM

## 2025-05-22 DIAGNOSIS — Z00.00 ROUTINE ADULT HEALTH MAINTENANCE: ICD-10-CM

## 2025-05-22 DIAGNOSIS — J45.909 UNCOMPLICATED ASTHMA, UNSPECIFIED ASTHMA SEVERITY, UNSPECIFIED WHETHER PERSISTENT: ICD-10-CM

## 2025-05-22 DIAGNOSIS — D50.0 IRON DEFICIENCY ANEMIA DUE TO CHRONIC BLOOD LOSS: ICD-10-CM

## 2025-05-22 PROCEDURE — 99395 PREV VISIT EST AGE 18-39: CPT | Performed by: FAMILY MEDICINE

## 2025-05-22 PROCEDURE — 99204 OFFICE O/P NEW MOD 45 MIN: CPT | Performed by: FAMILY MEDICINE

## 2025-05-23 NOTE — ASSESSMENT & PLAN NOTE
Stable, continue meds. And check labs. Discussed supportive care and return parameters.   Orders:    CBC and differential; Future    Comprehensive metabolic panel; Future    TSH, 3rd generation with Free T4 reflex; Future    Lipid Panel with Direct LDL reflex; Future    Iron Panel (Includes Ferritin, Iron Sat%, Iron, and TIBC); Future

## 2025-05-23 NOTE — PROGRESS NOTES
Name: Elizabeth Bull      : 1990      MRN: [id number]  Encounter Provider: Thomas Cardenas MD  Encounter Date: 2025   Encounter department: [facility]  :  Assessment & Plan  Uncomplicated asthma, unspecified asthma severity, unspecified whether persistent  Stable, continue meds. And check labs. Discussed supportive care and return parameters.   Orders:    CBC and differential; Future    Comprehensive metabolic panel; Future    TSH, 3rd generation with Free T4 reflex; Future    Lipid Panel with Direct LDL reflex; Future    Iron Panel (Includes Ferritin, Iron Sat%, Iron, and TIBC); Future    Abnormal uterine bleeding (AUB)  Stable, pt to continue meds. And check labs. Discussed supportive care and return parameters.   Orders:    CBC and differential; Future    Comprehensive metabolic panel; Future    TSH, 3rd generation with Free T4 reflex; Future    Lipid Panel with Direct LDL reflex; Future    Iron Panel (Includes Ferritin, Iron Sat%, Iron, and TIBC); Future    Gluten intolerance  Stable, continue meds. And check labs. Discussed supportive care and return parameters.   Orders:    CBC and differential; Future    Comprehensive metabolic panel; Future    TSH, 3rd generation with Free T4 reflex; Future    Lipid Panel with Direct LDL reflex; Future    Iron Panel (Includes Ferritin, Iron Sat%, Iron, and TIBC); Future    Routine adult health maintenance  Annual well visit. Discussed various safety and health maintenance issues including healthy diet like the Mediterranean diet, exercise, ample sleep, stress reduction, and healthy weight as tolerated. Discussed supportive care and return parameters.   Orders:    CBC and differential; Future    Comprehensive metabolic panel; Future    TSH, 3rd generation with Free T4 reflex; Future    Lipid Panel with Direct LDL reflex; Future    Iron Panel (Includes Ferritin, Iron Sat%, Iron, and TIBC); Future    Iron deficiency anemia due to  "chronic blood loss    Check labs. Discussed supportive care and return parameters.   Orders:    CBC and differential; Future    Comprehensive metabolic panel; Future    TSH, 3rd generation with Free T4 reflex; Future    Lipid Panel with Direct LDL reflex; Future    Iron Panel (Includes Ferritin, Iron Sat%, Iron, and TIBC); Future           History of Present Illness   Patient is a 33 y/o woman who presents to establish care at this practice. Pt admits asthma, AUB, h/o iron-def anemia and gluten intolerance. No fevers chills nausea or vomiting. Pt also here for annual well visit admits being active eats and sleeps well.      Review of Systems   Constitutional: Negative.    HENT: Negative.     Eyes: Negative.    Respiratory: Negative.     Cardiovascular: Negative.    Gastrointestinal: Negative.    Endocrine: Negative.    Genitourinary: Negative.    Musculoskeletal: Negative.    Allergic/Immunologic: Negative.    Neurological: Negative.    Hematological: Negative.    Psychiatric/Behavioral: Negative.     All other systems reviewed and are negative.      Objective   /70 (BP Location: Right arm, Patient Position: Sitting, Cuff Size: Standard)   Pulse 74   Temp 98.2 °F (36.8 °C) (Temporal)   Ht 5' 6\" (1.676 m)   Wt 68.2 kg (150 lb 6.4 oz)   SpO2 98%   BMI 24.28 kg/m²      Physical Exam  Constitutional:       General: She is not in acute distress.     Appearance: She is well-developed. She is not diaphoretic.   HENT:      Head: Normocephalic and atraumatic.      Right Ear: External ear normal.      Left Ear: External ear normal.      Nose: Nose normal.      Mouth/Throat:      Pharynx: No oropharyngeal exudate.     Eyes:      General:         Right eye: No discharge.         Left eye: No discharge.      Conjunctiva/sclera: Conjunctivae normal.      Pupils: Pupils are equal, round, and reactive to light.     Neck:      Thyroid: No thyromegaly.      Trachea: No tracheal deviation.     Cardiovascular:      Rate and " Rhythm: Normal rate and regular rhythm.      Heart sounds: Normal heart sounds. No murmur heard.     No friction rub. No gallop.   Pulmonary:      Effort: Pulmonary effort is normal. No respiratory distress.      Breath sounds: Normal breath sounds.   Abdominal:      General: There is no distension.      Palpations: Abdomen is soft.      Tenderness: There is no abdominal tenderness. There is no guarding or rebound.     Musculoskeletal:         General: Normal range of motion.   Lymphadenopathy:      Cervical: No cervical adenopathy.     Skin:     General: Skin is warm.     Neurological:      Mental Status: She is alert and oriented to person, place, and time.      Cranial Nerves: No cranial nerve deficit.     Psychiatric:         Behavior: Behavior normal.         Thought Content: Thought content normal.         Judgment: Judgment normal.

## 2025-05-23 NOTE — ASSESSMENT & PLAN NOTE
Annual well visit. Discussed various safety and health maintenance issues including healthy diet like the Mediterranean diet, exercise, ample sleep, stress reduction, and healthy weight as tolerated. Discussed supportive care and return parameters.   Orders:    CBC and differential; Future    Comprehensive metabolic panel; Future    TSH, 3rd generation with Free T4 reflex; Future    Lipid Panel with Direct LDL reflex; Future    Iron Panel (Includes Ferritin, Iron Sat%, Iron, and TIBC); Future

## 2025-05-23 NOTE — ASSESSMENT & PLAN NOTE
Check labs. Discussed supportive care and return parameters.   Orders:    CBC and differential; Future    Comprehensive metabolic panel; Future    TSH, 3rd generation with Free T4 reflex; Future    Lipid Panel with Direct LDL reflex; Future    Iron Panel (Includes Ferritin, Iron Sat%, Iron, and TIBC); Future

## 2025-05-29 ENCOUNTER — APPOINTMENT (OUTPATIENT)
Age: 35
End: 2025-05-29
Payer: COMMERCIAL

## 2025-05-29 DIAGNOSIS — K90.41 GLUTEN INTOLERANCE: ICD-10-CM

## 2025-05-29 DIAGNOSIS — N93.9 ABNORMAL UTERINE BLEEDING (AUB): ICD-10-CM

## 2025-05-29 DIAGNOSIS — D50.0 IRON DEFICIENCY ANEMIA DUE TO CHRONIC BLOOD LOSS: ICD-10-CM

## 2025-05-29 DIAGNOSIS — Z00.00 ROUTINE ADULT HEALTH MAINTENANCE: ICD-10-CM

## 2025-05-29 DIAGNOSIS — J45.909 UNCOMPLICATED ASTHMA, UNSPECIFIED ASTHMA SEVERITY, UNSPECIFIED WHETHER PERSISTENT: ICD-10-CM

## 2025-05-29 LAB
ALBUMIN SERPL BCG-MCNC: 4.4 G/DL (ref 3.5–5)
ALP SERPL-CCNC: 40 U/L (ref 34–104)
ALT SERPL W P-5'-P-CCNC: 22 U/L (ref 7–52)
ANION GAP SERPL CALCULATED.3IONS-SCNC: 7 MMOL/L (ref 4–13)
AST SERPL W P-5'-P-CCNC: 16 U/L (ref 13–39)
BASOPHILS # BLD AUTO: 0.05 THOUSANDS/ÂΜL (ref 0–0.1)
BASOPHILS NFR BLD AUTO: 1 % (ref 0–1)
BILIRUB SERPL-MCNC: 0.48 MG/DL (ref 0.2–1)
BUN SERPL-MCNC: 11 MG/DL (ref 5–25)
CALCIUM SERPL-MCNC: 9.1 MG/DL (ref 8.4–10.2)
CHLORIDE SERPL-SCNC: 105 MMOL/L (ref 96–108)
CHOLEST SERPL-MCNC: 157 MG/DL (ref ?–200)
CO2 SERPL-SCNC: 28 MMOL/L (ref 21–32)
CREAT SERPL-MCNC: 0.69 MG/DL (ref 0.6–1.3)
EOSINOPHIL # BLD AUTO: 0.09 THOUSAND/ÂΜL (ref 0–0.61)
EOSINOPHIL NFR BLD AUTO: 2 % (ref 0–6)
ERYTHROCYTE [DISTWIDTH] IN BLOOD BY AUTOMATED COUNT: 12.1 % (ref 11.6–15.1)
FERRITIN SERPL-MCNC: 7 NG/ML (ref 30–307)
GFR SERPL CREATININE-BSD FRML MDRD: 112 ML/MIN/1.73SQ M
GLUCOSE P FAST SERPL-MCNC: 85 MG/DL (ref 65–99)
HCT VFR BLD AUTO: 43.1 % (ref 34.8–46.1)
HDLC SERPL-MCNC: 47 MG/DL
HGB BLD-MCNC: 13.8 G/DL (ref 11.5–15.4)
IMM GRANULOCYTES # BLD AUTO: 0.02 THOUSAND/UL (ref 0–0.2)
IMM GRANULOCYTES NFR BLD AUTO: 0 % (ref 0–2)
IRON SATN MFR SERPL: 19 % (ref 15–50)
IRON SERPL-MCNC: 103 UG/DL (ref 50–212)
LDLC SERPL CALC-MCNC: 99 MG/DL (ref 0–100)
LYMPHOCYTES # BLD AUTO: 2.02 THOUSANDS/ÂΜL (ref 0.6–4.47)
LYMPHOCYTES NFR BLD AUTO: 36 % (ref 14–44)
MCH RBC QN AUTO: 30.9 PG (ref 26.8–34.3)
MCHC RBC AUTO-ENTMCNC: 32 G/DL (ref 31.4–37.4)
MCV RBC AUTO: 96 FL (ref 82–98)
MONOCYTES # BLD AUTO: 0.52 THOUSAND/ÂΜL (ref 0.17–1.22)
MONOCYTES NFR BLD AUTO: 9 % (ref 4–12)
NEUTROPHILS # BLD AUTO: 2.95 THOUSANDS/ÂΜL (ref 1.85–7.62)
NEUTS SEG NFR BLD AUTO: 52 % (ref 43–75)
NRBC BLD AUTO-RTO: 0 /100 WBCS
PLATELET # BLD AUTO: 281 THOUSANDS/UL (ref 149–390)
PMV BLD AUTO: 12 FL (ref 8.9–12.7)
POTASSIUM SERPL-SCNC: 4.3 MMOL/L (ref 3.5–5.3)
PROT SERPL-MCNC: 7.3 G/DL (ref 6.4–8.4)
RBC # BLD AUTO: 4.47 MILLION/UL (ref 3.81–5.12)
SODIUM SERPL-SCNC: 140 MMOL/L (ref 135–147)
TIBC SERPL-MCNC: 537.6 UG/DL (ref 250–450)
TRANSFERRIN SERPL-MCNC: 384 MG/DL (ref 203–362)
TRIGL SERPL-MCNC: 56 MG/DL (ref ?–150)
TSH SERPL DL<=0.05 MIU/L-ACNC: 0.84 UIU/ML (ref 0.45–4.5)
UIBC SERPL-MCNC: 435 UG/DL (ref 155–355)
WBC # BLD AUTO: 5.65 THOUSAND/UL (ref 4.31–10.16)

## 2025-05-29 PROCEDURE — 82728 ASSAY OF FERRITIN: CPT

## 2025-05-29 PROCEDURE — 80053 COMPREHEN METABOLIC PANEL: CPT

## 2025-05-29 PROCEDURE — 83540 ASSAY OF IRON: CPT

## 2025-05-29 PROCEDURE — 83550 IRON BINDING TEST: CPT

## 2025-05-29 PROCEDURE — 85025 COMPLETE CBC W/AUTO DIFF WBC: CPT

## 2025-05-29 PROCEDURE — 36415 COLL VENOUS BLD VENIPUNCTURE: CPT

## 2025-05-29 PROCEDURE — 80061 LIPID PANEL: CPT

## 2025-05-29 PROCEDURE — 84443 ASSAY THYROID STIM HORMONE: CPT

## 2025-05-30 ENCOUNTER — RESULTS FOLLOW-UP (OUTPATIENT)
Age: 35
End: 2025-05-30

## 2025-05-30 DIAGNOSIS — E61.1 LOW IRON: Primary | ICD-10-CM

## 2025-05-30 NOTE — RESULT ENCOUNTER NOTE
Please call patient. Labs are stable except iron is extremely low. Recommend taking OTC iron and rechecking labs in 3 mo. Will discuss more fully at next visit.

## 2025-05-30 NOTE — RESULT ENCOUNTER NOTE
Spoke with patient and she is aware her labs are stable except her iron and that Marina is recommending starting iron OTC and repeating labs in 3 months. Orders placed

## (undated) DEVICE — PACK C-SECTION PBDS

## (undated) DEVICE — PREMIUM DRY TRAY LF: Brand: MEDLINE INDUSTRIES, INC.

## (undated) DEVICE — ABDOMINAL PAD: Brand: DERMACEA

## (undated) DEVICE — ADHESIVE SKN CLSR HISTOACRYL FLEX 0.5ML LF

## (undated) DEVICE — SWABSTCK, BENZOIN TINCTURE, 1/PK, STRL: Brand: APLICARE

## (undated) DEVICE — PVC URETHRAL CATHETER: Brand: DOVER

## (undated) DEVICE — STRL ALLENTOWN HYSTEROSCOPY PK: Brand: CARDINAL HEALTH

## (undated) DEVICE — 3M™ STERI-STRIP™ REINFORCED ADHESIVE SKIN CLOSURES, R1547, 1/2 IN X 4 IN (12 MM X 100 MM), 6 STRIPS/ENVELOPE: Brand: 3M™ STERI-STRIP™

## (undated) DEVICE — SUT MONOCRYL 4-0 PS-2 27 IN Y426H

## (undated) DEVICE — GLOVE INDICATOR PI UNDERGLOVE SZ 7.5 BLUE

## (undated) DEVICE — LIGHT GLOVE GREEN

## (undated) DEVICE — GLOVE INDICATOR UNDERGLOVE SZ 6 BLUE

## (undated) DEVICE — GLOVE SRG BIOGEL ECLIPSE 6

## (undated) DEVICE — 2000CC GUARDIAN II: Brand: GUARDIAN

## (undated) DEVICE — TELFA NON-ADHERENT ABSORBENT DRESSING: Brand: TELFA

## (undated) DEVICE — CYSTO TUBING SINGLE IRRIGATION

## (undated) DEVICE — SUT VICRYL 0 CT 36 IN J958H

## (undated) DEVICE — SUT VICRYL 2-0 CT-1 36 IN J945H

## (undated) DEVICE — DRAPE EQUIPMENT RF WAND

## (undated) DEVICE — ABG MICROSTICKS SAFETY

## (undated) DEVICE — IV EXTENSION TUBING 33 IN

## (undated) DEVICE — SUT VICRYL 0 CTX 36 IN J978H

## (undated) DEVICE — CHLORAPREP HI-LITE 26ML ORANGE

## (undated) DEVICE — UNDER BUTTOCKS DRAPE W/FLUID CONTROL POUCH: Brand: CONVERTORS

## (undated) DEVICE — SUT PLAIN 3-0 CT-1 27 IN 842H

## (undated) DEVICE — SCD SEQUENTIAL COMPRESSION COMFORT SLEEVE MEDIUM KNEE LENGTH: Brand: KENDALL SCD

## (undated) DEVICE — GLOVE SRG LF STRL BGL SKNSNS 5.5 PF

## (undated) DEVICE — SUT VICRYL 0 CT-1 36 IN J946H

## (undated) DEVICE — SUT CHROMIC 0 CT-1 27 IN 812H

## (undated) DEVICE — GLOVE SRG BIOGEL ECLIPSE 7